# Patient Record
Sex: MALE | NOT HISPANIC OR LATINO | Employment: UNEMPLOYED | RURAL
[De-identification: names, ages, dates, MRNs, and addresses within clinical notes are randomized per-mention and may not be internally consistent; named-entity substitution may affect disease eponyms.]

---

## 2020-11-17 ENCOUNTER — HISTORICAL (OUTPATIENT)
Dept: ADMINISTRATIVE | Facility: HOSPITAL | Age: 85
End: 2020-11-17

## 2020-11-17 LAB — SARS-COV+SARS-COV-2 AG RESP QL IA.RAPID: NEGATIVE

## 2020-11-20 ENCOUNTER — HISTORICAL (OUTPATIENT)
Dept: ADMINISTRATIVE | Facility: HOSPITAL | Age: 85
End: 2020-11-20

## 2020-11-20 LAB — SARS-COV+SARS-COV-2 AG RESP QL IA.RAPID: NEGATIVE

## 2020-11-24 ENCOUNTER — HISTORICAL (OUTPATIENT)
Dept: ADMINISTRATIVE | Facility: HOSPITAL | Age: 85
End: 2020-11-24

## 2020-11-24 LAB — SARS-COV+SARS-COV-2 AG RESP QL IA.RAPID: NEGATIVE

## 2020-11-27 ENCOUNTER — HISTORICAL (OUTPATIENT)
Dept: ADMINISTRATIVE | Facility: HOSPITAL | Age: 85
End: 2020-11-27

## 2020-11-27 LAB — SARS-COV+SARS-COV-2 AG RESP QL IA.RAPID: NEGATIVE

## 2020-12-01 ENCOUNTER — HISTORICAL (OUTPATIENT)
Dept: ADMINISTRATIVE | Facility: HOSPITAL | Age: 85
End: 2020-12-01

## 2020-12-01 LAB — SARS-COV+SARS-COV-2 AG RESP QL IA.RAPID: NEGATIVE

## 2020-12-04 ENCOUNTER — HISTORICAL (OUTPATIENT)
Dept: ADMINISTRATIVE | Facility: HOSPITAL | Age: 85
End: 2020-12-04

## 2020-12-04 LAB — SARS-COV+SARS-COV-2 AG RESP QL IA.RAPID: NEGATIVE

## 2020-12-08 ENCOUNTER — HISTORICAL (OUTPATIENT)
Dept: ADMINISTRATIVE | Facility: HOSPITAL | Age: 85
End: 2020-12-08

## 2020-12-08 LAB — SARS-COV+SARS-COV-2 AG RESP QL IA.RAPID: NEGATIVE

## 2020-12-11 ENCOUNTER — HISTORICAL (OUTPATIENT)
Dept: ADMINISTRATIVE | Facility: HOSPITAL | Age: 85
End: 2020-12-11

## 2020-12-11 LAB — SARS-COV+SARS-COV-2 AG RESP QL IA.RAPID: NEGATIVE

## 2020-12-15 ENCOUNTER — HISTORICAL (OUTPATIENT)
Dept: ADMINISTRATIVE | Facility: HOSPITAL | Age: 85
End: 2020-12-15

## 2020-12-15 LAB — SARS-COV+SARS-COV-2 AG RESP QL IA.RAPID: NEGATIVE

## 2020-12-18 ENCOUNTER — HISTORICAL (OUTPATIENT)
Dept: ADMINISTRATIVE | Facility: HOSPITAL | Age: 85
End: 2020-12-18

## 2020-12-18 LAB — SARS-COV+SARS-COV-2 AG RESP QL IA.RAPID: NEGATIVE

## 2020-12-22 ENCOUNTER — HISTORICAL (OUTPATIENT)
Dept: ADMINISTRATIVE | Facility: HOSPITAL | Age: 85
End: 2020-12-22

## 2020-12-22 LAB — SARS-COV+SARS-COV-2 AG RESP QL IA.RAPID: NEGATIVE

## 2020-12-24 ENCOUNTER — HISTORICAL (OUTPATIENT)
Dept: ADMINISTRATIVE | Facility: HOSPITAL | Age: 85
End: 2020-12-24

## 2020-12-24 LAB — SARS-COV+SARS-COV-2 AG RESP QL IA.RAPID: NEGATIVE

## 2020-12-29 ENCOUNTER — HISTORICAL (OUTPATIENT)
Dept: ADMINISTRATIVE | Facility: HOSPITAL | Age: 85
End: 2020-12-29

## 2020-12-29 LAB — SARS-COV+SARS-COV-2 AG RESP QL IA.RAPID: NEGATIVE

## 2020-12-31 ENCOUNTER — HISTORICAL (OUTPATIENT)
Dept: ADMINISTRATIVE | Facility: HOSPITAL | Age: 85
End: 2020-12-31

## 2020-12-31 LAB — SARS-COV+SARS-COV-2 AG RESP QL IA.RAPID: NEGATIVE

## 2021-01-05 ENCOUNTER — HISTORICAL (OUTPATIENT)
Dept: ADMINISTRATIVE | Facility: HOSPITAL | Age: 86
End: 2021-01-05

## 2021-01-05 LAB — SARS-COV+SARS-COV-2 AG RESP QL IA.RAPID: NEGATIVE

## 2021-01-07 ENCOUNTER — HISTORICAL (OUTPATIENT)
Dept: ADMINISTRATIVE | Facility: HOSPITAL | Age: 86
End: 2021-01-07

## 2021-01-07 LAB — SARS-COV+SARS-COV-2 AG RESP QL IA.RAPID: NEGATIVE

## 2021-01-12 ENCOUNTER — HISTORICAL (OUTPATIENT)
Dept: ADMINISTRATIVE | Facility: HOSPITAL | Age: 86
End: 2021-01-12

## 2021-01-12 LAB — SARS-COV+SARS-COV-2 AG RESP QL IA.RAPID: NEGATIVE

## 2021-01-14 ENCOUNTER — HISTORICAL (OUTPATIENT)
Dept: ADMINISTRATIVE | Facility: HOSPITAL | Age: 86
End: 2021-01-14

## 2021-01-14 LAB — SARS-COV+SARS-COV-2 AG RESP QL IA.RAPID: NEGATIVE

## 2021-01-19 ENCOUNTER — HISTORICAL (OUTPATIENT)
Dept: ADMINISTRATIVE | Facility: HOSPITAL | Age: 86
End: 2021-01-19

## 2021-01-19 LAB — SARS-COV+SARS-COV-2 AG RESP QL IA.RAPID: NEGATIVE

## 2021-01-21 ENCOUNTER — HISTORICAL (OUTPATIENT)
Dept: ADMINISTRATIVE | Facility: HOSPITAL | Age: 86
End: 2021-01-21

## 2021-01-21 LAB — SARS-COV+SARS-COV-2 AG RESP QL IA.RAPID: NEGATIVE

## 2021-01-26 ENCOUNTER — HISTORICAL (OUTPATIENT)
Dept: ADMINISTRATIVE | Facility: HOSPITAL | Age: 86
End: 2021-01-26

## 2021-01-26 LAB — SARS-COV+SARS-COV-2 AG RESP QL IA.RAPID: NEGATIVE

## 2021-01-28 ENCOUNTER — HISTORICAL (OUTPATIENT)
Dept: ADMINISTRATIVE | Facility: HOSPITAL | Age: 86
End: 2021-01-28

## 2021-01-28 LAB — SARS-COV+SARS-COV-2 AG RESP QL IA.RAPID: NEGATIVE

## 2021-02-02 ENCOUNTER — HISTORICAL (OUTPATIENT)
Dept: ADMINISTRATIVE | Facility: HOSPITAL | Age: 86
End: 2021-02-02

## 2021-02-02 LAB — SARS-COV+SARS-COV-2 AG RESP QL IA.RAPID: NEGATIVE

## 2021-02-04 ENCOUNTER — HISTORICAL (OUTPATIENT)
Dept: ADMINISTRATIVE | Facility: HOSPITAL | Age: 86
End: 2021-02-04

## 2021-02-04 LAB — SARS-COV+SARS-COV-2 AG RESP QL IA.RAPID: NEGATIVE

## 2021-02-09 ENCOUNTER — HISTORICAL (OUTPATIENT)
Dept: ADMINISTRATIVE | Facility: HOSPITAL | Age: 86
End: 2021-02-09

## 2021-02-09 LAB — SARS-COV+SARS-COV-2 AG RESP QL IA.RAPID: NEGATIVE

## 2021-02-11 ENCOUNTER — HISTORICAL (OUTPATIENT)
Dept: ADMINISTRATIVE | Facility: HOSPITAL | Age: 86
End: 2021-02-11

## 2021-02-12 LAB — SARS-COV+SARS-COV-2 AG RESP QL IA.RAPID: NEGATIVE

## 2021-02-17 ENCOUNTER — HISTORICAL (OUTPATIENT)
Dept: ADMINISTRATIVE | Facility: HOSPITAL | Age: 86
End: 2021-02-17

## 2021-02-17 LAB — SARS-COV+SARS-COV-2 AG RESP QL IA.RAPID: NEGATIVE

## 2021-02-19 ENCOUNTER — HISTORICAL (OUTPATIENT)
Dept: ADMINISTRATIVE | Facility: HOSPITAL | Age: 86
End: 2021-02-19

## 2021-02-19 LAB — SARS-COV+SARS-COV-2 AG RESP QL IA.RAPID: NEGATIVE

## 2021-02-23 ENCOUNTER — HISTORICAL (OUTPATIENT)
Dept: ADMINISTRATIVE | Facility: HOSPITAL | Age: 86
End: 2021-02-23

## 2021-02-23 LAB — SARS-COV+SARS-COV-2 AG RESP QL IA.RAPID: NEGATIVE

## 2021-02-25 ENCOUNTER — HISTORICAL (OUTPATIENT)
Dept: ADMINISTRATIVE | Facility: HOSPITAL | Age: 86
End: 2021-02-25

## 2021-02-26 LAB — SARS-COV+SARS-COV-2 AG RESP QL IA.RAPID: NEGATIVE

## 2022-09-26 ENCOUNTER — LAB REQUISITION (OUTPATIENT)
Dept: LAB | Facility: HOSPITAL | Age: 87
End: 2022-09-26
Attending: INTERNAL MEDICINE
Payer: MEDICARE

## 2022-09-26 DIAGNOSIS — I10 ESSENTIAL (PRIMARY) HYPERTENSION: ICD-10-CM

## 2022-09-26 DIAGNOSIS — E78.5 HYPERLIPIDEMIA, UNSPECIFIED: ICD-10-CM

## 2022-09-26 LAB
ALBUMIN SERPL BCP-MCNC: 2.9 G/DL (ref 3.5–5)
ALBUMIN/GLOB SERPL: 0.9 {RATIO}
ALP SERPL-CCNC: 86 U/L (ref 45–115)
ALT SERPL W P-5'-P-CCNC: 14 U/L (ref 16–61)
ANION GAP SERPL CALCULATED.3IONS-SCNC: 13 MMOL/L (ref 7–16)
AST SERPL W P-5'-P-CCNC: 16 U/L (ref 15–37)
BASOPHILS # BLD AUTO: 0.01 K/UL (ref 0–0.2)
BASOPHILS NFR BLD AUTO: 0.5 % (ref 0–1)
BILIRUB SERPL-MCNC: 0.3 MG/DL (ref ?–1.2)
BUN SERPL-MCNC: 19 MG/DL (ref 7–18)
BUN/CREAT SERPL: 15 (ref 6–20)
CALCIUM SERPL-MCNC: 8.3 MG/DL (ref 8.5–10.1)
CHLORIDE SERPL-SCNC: 107 MMOL/L (ref 98–107)
CHOLEST SERPL-MCNC: 153 MG/DL (ref 0–200)
CHOLEST/HDLC SERPL: 2.2 {RATIO}
CO2 SERPL-SCNC: 28 MMOL/L (ref 21–32)
CREAT SERPL-MCNC: 1.24 MG/DL (ref 0.7–1.3)
DIFFERENTIAL METHOD BLD: ABNORMAL
EGFR (NO RACE VARIABLE) (RUSH/TITUS): 56 ML/MIN/1.73M²
EOSINOPHIL # BLD AUTO: 0.13 K/UL (ref 0–0.5)
EOSINOPHIL NFR BLD AUTO: 6.6 % (ref 1–4)
EOSINOPHIL NFR BLD MANUAL: 4 % (ref 1–4)
ERYTHROCYTE [DISTWIDTH] IN BLOOD BY AUTOMATED COUNT: 16.4 % (ref 11.5–14.5)
GLOBULIN SER-MCNC: 3.3 G/DL (ref 2–4)
GLUCOSE SERPL-MCNC: 67 MG/DL (ref 74–106)
HCT VFR BLD AUTO: 55.4 % (ref 40–54)
HDLC SERPL-MCNC: 71 MG/DL (ref 40–60)
HGB BLD-MCNC: 17.9 G/DL (ref 13.5–18)
LDLC SERPL CALC-MCNC: 70 MG/DL
LDLC/HDLC SERPL: 1 {RATIO}
LYMPHOCYTES # BLD AUTO: 1.25 K/UL (ref 1–4.8)
LYMPHOCYTES NFR BLD AUTO: 63.1 % (ref 27–41)
LYMPHOCYTES NFR BLD MANUAL: 66 % (ref 27–41)
MCH RBC QN AUTO: 30.3 PG (ref 27–31)
MCHC RBC AUTO-ENTMCNC: 32.3 G/DL (ref 32–36)
MCV RBC AUTO: 93.9 FL (ref 80–96)
MONOCYTES # BLD AUTO: 0.12 K/UL (ref 0–0.8)
MONOCYTES NFR BLD AUTO: 6.1 % (ref 2–6)
MONOCYTES NFR BLD MANUAL: 5 % (ref 2–6)
MPC BLD CALC-MCNC: 9.6 FL (ref 9.4–12.4)
NEUTROPHILS # BLD AUTO: 0.47 K/UL (ref 1.8–7.7)
NEUTROPHILS NFR BLD AUTO: 23.7 % (ref 53–65)
NEUTS SEG NFR BLD MANUAL: 25 % (ref 50–62)
NONHDLC SERPL-MCNC: 82 MG/DL
NRBC BLD MANUAL-RTO: ABNORMAL %
PLATELET # BLD AUTO: 94 K/UL (ref 150–400)
PLATELET MORPHOLOGY: ABNORMAL
POTASSIUM SERPL-SCNC: 4.4 MMOL/L (ref 3.5–5.1)
PROT SERPL-MCNC: 6.2 G/DL (ref 6.4–8.2)
RBC # BLD AUTO: 5.9 M/UL (ref 4.6–6.2)
RBC MORPH BLD: NORMAL
SODIUM SERPL-SCNC: 144 MMOL/L (ref 136–145)
T4 FREE SERPL-MCNC: 1.03 NG/DL (ref 0.76–1.46)
TRIGL SERPL-MCNC: 58 MG/DL (ref 35–150)
TSH SERPL DL<=0.005 MIU/L-ACNC: 2.81 UIU/ML (ref 0.36–3.74)
VLDLC SERPL-MCNC: 12 MG/DL
WBC # BLD AUTO: 1.98 K/UL (ref 4.5–11)

## 2022-09-26 PROCEDURE — 85025 COMPLETE CBC W/AUTO DIFF WBC: CPT

## 2022-09-26 PROCEDURE — 84439 ASSAY OF FREE THYROXINE: CPT

## 2022-09-26 PROCEDURE — 80053 COMPREHEN METABOLIC PANEL: CPT

## 2022-09-26 PROCEDURE — 80061 LIPID PANEL: CPT

## 2022-09-26 PROCEDURE — 84443 ASSAY THYROID STIM HORMONE: CPT

## 2023-01-01 ENCOUNTER — LAB REQUISITION (OUTPATIENT)
Dept: LAB | Facility: HOSPITAL | Age: 88
End: 2023-01-01
Attending: INTERNAL MEDICINE
Payer: MEDICARE

## 2023-01-01 ENCOUNTER — HOSPITAL ENCOUNTER (INPATIENT)
Facility: HOSPITAL | Age: 88
LOS: 10 days | Discharge: NURSING, REHAB OR LONG-TERM CARE | DRG: 690 | End: 2023-07-24
Attending: FAMILY MEDICINE | Admitting: FAMILY MEDICINE
Payer: MEDICARE

## 2023-01-01 ENCOUNTER — HOSPITAL ENCOUNTER (OUTPATIENT)
Dept: RADIOLOGY | Facility: HOSPITAL | Age: 88
Discharge: HOME | End: 2023-08-28
Payer: MEDICARE

## 2023-01-01 ENCOUNTER — ANESTHESIA (OUTPATIENT)
Dept: GASTROENTEROLOGY | Facility: HOSPITAL | Age: 88
End: 2023-01-01
Payer: MEDICARE

## 2023-01-01 ENCOUNTER — ANESTHESIA EVENT (OUTPATIENT)
Dept: GASTROENTEROLOGY | Facility: HOSPITAL | Age: 88
End: 2023-01-01
Payer: MEDICARE

## 2023-01-01 ENCOUNTER — HOSPITAL ENCOUNTER (EMERGENCY)
Facility: HOSPITAL | Age: 88
Discharge: NURSING, REHAB OR LONG-TERM CARE | End: 2023-08-07
Attending: FAMILY MEDICINE
Payer: MEDICARE

## 2023-01-01 ENCOUNTER — HOSPITAL ENCOUNTER (INPATIENT)
Facility: HOSPITAL | Age: 88
LOS: 3 days | Discharge: NURSING, REHAB OR LONG-TERM CARE | DRG: 690 | End: 2023-07-14
Attending: FAMILY MEDICINE | Admitting: FAMILY MEDICINE
Payer: MEDICARE

## 2023-01-01 ENCOUNTER — HOSPITAL ENCOUNTER (OUTPATIENT)
Dept: GASTROENTEROLOGY | Facility: HOSPITAL | Age: 88
Discharge: HOME | End: 2023-07-21
Attending: INTERNAL MEDICINE
Payer: MEDICARE

## 2023-01-01 ENCOUNTER — HOSPITAL ENCOUNTER (EMERGENCY)
Facility: HOSPITAL | Age: 88
Discharge: HOME | End: 2023-07-30
Payer: MEDICARE

## 2023-01-01 VITALS
WEIGHT: 147 LBS | TEMPERATURE: 99 F | HEART RATE: 84 BPM | SYSTOLIC BLOOD PRESSURE: 117 MMHG | RESPIRATION RATE: 17 BRPM | OXYGEN SATURATION: 100 % | HEIGHT: 70 IN | BODY MASS INDEX: 21.05 KG/M2 | DIASTOLIC BLOOD PRESSURE: 67 MMHG

## 2023-01-01 VITALS
BODY MASS INDEX: 21.09 KG/M2 | SYSTOLIC BLOOD PRESSURE: 156 MMHG | DIASTOLIC BLOOD PRESSURE: 63 MMHG | DIASTOLIC BLOOD PRESSURE: 51 MMHG | OXYGEN SATURATION: 98 % | HEART RATE: 59 BPM | OXYGEN SATURATION: 100 % | RESPIRATION RATE: 19 BRPM | BODY MASS INDEX: 23.05 KG/M2 | RESPIRATION RATE: 18 BRPM | SYSTOLIC BLOOD PRESSURE: 121 MMHG | TEMPERATURE: 98 F | SYSTOLIC BLOOD PRESSURE: 113 MMHG | TEMPERATURE: 98 F | HEIGHT: 70 IN | TEMPERATURE: 98 F | DIASTOLIC BLOOD PRESSURE: 101 MMHG | BODY MASS INDEX: 21.05 KG/M2 | HEART RATE: 83 BPM | WEIGHT: 147 LBS | HEIGHT: 70 IN | HEART RATE: 57 BPM | RESPIRATION RATE: 17 BRPM | OXYGEN SATURATION: 98 % | HEIGHT: 70 IN | WEIGHT: 161 LBS

## 2023-01-01 VITALS
RESPIRATION RATE: 14 BRPM | TEMPERATURE: 98 F | OXYGEN SATURATION: 100 % | DIASTOLIC BLOOD PRESSURE: 46 MMHG | HEART RATE: 50 BPM | SYSTOLIC BLOOD PRESSURE: 139 MMHG

## 2023-01-01 DIAGNOSIS — N39.0 URINARY TRACT INFECTION: ICD-10-CM

## 2023-01-01 DIAGNOSIS — R13.11 ORAL PHASE DYSPHAGIA: ICD-10-CM

## 2023-01-01 DIAGNOSIS — I10 ESSENTIAL (PRIMARY) HYPERTENSION: ICD-10-CM

## 2023-01-01 DIAGNOSIS — K29.00 ACUTE SUPERFICIAL GASTRITIS WITHOUT HEMORRHAGE: ICD-10-CM

## 2023-01-01 DIAGNOSIS — K44.9 HH (HIATUS HERNIA): ICD-10-CM

## 2023-01-01 DIAGNOSIS — M62.50 MUSCLE WASTING AND ATROPHY, NOT ELSEWHERE CLASSIFIED, UNSPECIFIED SITE: ICD-10-CM

## 2023-01-01 DIAGNOSIS — R62.7 ADULT FAILURE TO THRIVE: Primary | ICD-10-CM

## 2023-01-01 DIAGNOSIS — R05.9 COUGH: ICD-10-CM

## 2023-01-01 DIAGNOSIS — E78.5 HYPERLIPIDEMIA, UNSPECIFIED: ICD-10-CM

## 2023-01-01 DIAGNOSIS — K94.23 PEG TUBE MALFUNCTION: Primary | ICD-10-CM

## 2023-01-01 DIAGNOSIS — R11.0 NAUSEA: ICD-10-CM

## 2023-01-01 DIAGNOSIS — R11.10 VOMITING, UNSPECIFIED VOMITING TYPE, UNSPECIFIED WHETHER NAUSEA PRESENT: Primary | ICD-10-CM

## 2023-01-01 DIAGNOSIS — N30.01 ACUTE CYSTITIS WITH HEMATURIA: Primary | ICD-10-CM

## 2023-01-01 DIAGNOSIS — R41.82 ALTERED MENTAL STATUS, UNSPECIFIED: ICD-10-CM

## 2023-01-01 DIAGNOSIS — Z46.59: ICD-10-CM

## 2023-01-01 DIAGNOSIS — Z93.1 S/P PERCUTANEOUS ENDOSCOPIC GASTROSTOMY (PEG) TUBE PLACEMENT: Primary | ICD-10-CM

## 2023-01-01 DIAGNOSIS — G30.9 ALZHEIMER'S DISEASE, UNSPECIFIED (CODE): ICD-10-CM

## 2023-01-01 DIAGNOSIS — Z93.1 S/P PERCUTANEOUS ENDOSCOPIC GASTROSTOMY (PEG) TUBE PLACEMENT: ICD-10-CM

## 2023-01-01 LAB
25(OH)D3 SERPL-MCNC: 37.7 NG/ML
ALBUMIN SERPL BCP-MCNC: 2.5 G/DL (ref 3.5–5)
ALBUMIN SERPL BCP-MCNC: 2.6 G/DL (ref 3.5–5)
ALBUMIN SERPL BCP-MCNC: 2.7 G/DL (ref 3.5–5)
ALBUMIN SERPL BCP-MCNC: 2.9 G/DL (ref 3.5–5)
ALBUMIN/GLOB SERPL: 0.5 {RATIO}
ALBUMIN/GLOB SERPL: 0.6 {RATIO}
ALBUMIN/GLOB SERPL: 0.7 {RATIO}
ALP SERPL-CCNC: 77 U/L (ref 45–115)
ALP SERPL-CCNC: 88 U/L (ref 45–115)
ALP SERPL-CCNC: 90 U/L (ref 45–115)
ALP SERPL-CCNC: 91 U/L (ref 45–115)
ALP SERPL-CCNC: 92 U/L (ref 45–115)
ALP SERPL-CCNC: 96 U/L (ref 45–115)
ALT SERPL W P-5'-P-CCNC: 16 U/L (ref 16–61)
ALT SERPL W P-5'-P-CCNC: 23 U/L (ref 16–61)
ALT SERPL W P-5'-P-CCNC: 23 U/L (ref 16–61)
ALT SERPL W P-5'-P-CCNC: 27 U/L (ref 16–61)
ALT SERPL W P-5'-P-CCNC: 39 U/L (ref 16–61)
ALT SERPL W P-5'-P-CCNC: 43 U/L (ref 16–61)
ANION GAP SERPL CALCULATED.3IONS-SCNC: 11 MMOL/L (ref 7–16)
ANION GAP SERPL CALCULATED.3IONS-SCNC: 11 MMOL/L (ref 7–16)
ANION GAP SERPL CALCULATED.3IONS-SCNC: 12 MMOL/L (ref 7–16)
ANION GAP SERPL CALCULATED.3IONS-SCNC: 15 MMOL/L (ref 7–16)
AST SERPL W P-5'-P-CCNC: 21 U/L (ref 15–37)
AST SERPL W P-5'-P-CCNC: 23 U/L (ref 15–37)
AST SERPL W P-5'-P-CCNC: 25 U/L (ref 15–37)
AST SERPL W P-5'-P-CCNC: 28 U/L (ref 15–37)
AST SERPL W P-5'-P-CCNC: 37 U/L (ref 15–37)
AST SERPL W P-5'-P-CCNC: 51 U/L (ref 15–37)
BACTERIA #/AREA URNS HPF: ABNORMAL /HPF
BACTERIA BLD CULT: NORMAL
BASOPHILS # BLD AUTO: 0.01 K/UL (ref 0–0.2)
BASOPHILS # BLD AUTO: 0.02 K/UL (ref 0–0.2)
BASOPHILS # BLD AUTO: 0.02 K/UL (ref 0–0.2)
BASOPHILS # BLD AUTO: 0.03 K/UL (ref 0–0.2)
BASOPHILS NFR BLD AUTO: 0.2 % (ref 0–1)
BASOPHILS NFR BLD AUTO: 0.3 % (ref 0–1)
BASOPHILS NFR BLD AUTO: 0.3 % (ref 0–1)
BASOPHILS NFR BLD AUTO: 0.5 % (ref 0–1)
BASOPHILS NFR BLD AUTO: 0.8 % (ref 0–1)
BILIRUB DIRECT SERPL-MCNC: 0.1 MG/DL (ref 0–0.2)
BILIRUB SERPL-MCNC: 0.2 MG/DL (ref ?–1.2)
BILIRUB SERPL-MCNC: 0.3 MG/DL (ref ?–1.2)
BILIRUB SERPL-MCNC: 0.3 MG/DL (ref ?–1.2)
BILIRUB SERPL-MCNC: 0.4 MG/DL (ref ?–1.2)
BILIRUB SERPL-MCNC: 0.4 MG/DL (ref ?–1.2)
BILIRUB SERPL-MCNC: 0.5 MG/DL (ref ?–1.2)
BILIRUB UR QL STRIP: NEGATIVE
BUN SERPL-MCNC: 16 MG/DL (ref 7–18)
BUN SERPL-MCNC: 2 MG/DL (ref 7–18)
BUN SERPL-MCNC: 27 MG/DL (ref 7–18)
BUN SERPL-MCNC: 32 MG/DL (ref 7–18)
BUN SERPL-MCNC: 32 MG/DL (ref 7–18)
BUN SERPL-MCNC: 35 MG/DL (ref 7–18)
BUN SERPL-MCNC: 38 MG/DL (ref 7–18)
BUN SERPL-MCNC: 4 MG/DL (ref 7–18)
BUN SERPL-MCNC: 41 MG/DL (ref 7–18)
BUN SERPL-MCNC: 9 MG/DL (ref 7–18)
BUN/CREAT SERPL: 14 (ref 6–20)
BUN/CREAT SERPL: 18 (ref 6–20)
BUN/CREAT SERPL: 2 (ref 6–20)
BUN/CREAT SERPL: 24 (ref 6–20)
BUN/CREAT SERPL: 25 (ref 6–20)
BUN/CREAT SERPL: 26 (ref 6–20)
BUN/CREAT SERPL: 26 (ref 6–20)
BUN/CREAT SERPL: 28 (ref 6–20)
BUN/CREAT SERPL: 4 (ref 6–20)
BUN/CREAT SERPL: 9 (ref 6–20)
CALCIUM SERPL-MCNC: 8.1 MG/DL (ref 8.5–10.1)
CALCIUM SERPL-MCNC: 8.3 MG/DL (ref 8.5–10.1)
CALCIUM SERPL-MCNC: 8.7 MG/DL (ref 8.5–10.1)
CALCIUM SERPL-MCNC: 8.8 MG/DL (ref 8.5–10.1)
CALCIUM SERPL-MCNC: 8.9 MG/DL (ref 8.5–10.1)
CALCIUM SERPL-MCNC: 8.9 MG/DL (ref 8.5–10.1)
CALCIUM SERPL-MCNC: 9.1 MG/DL (ref 8.5–10.1)
CALCIUM SERPL-MCNC: 9.2 MG/DL (ref 8.5–10.1)
CALCIUM SERPL-MCNC: 9.3 MG/DL (ref 8.5–10.1)
CALCIUM SERPL-MCNC: 9.3 MG/DL (ref 8.5–10.1)
CHLORIDE SERPL-SCNC: 102 MMOL/L (ref 98–107)
CHLORIDE SERPL-SCNC: 103 MMOL/L (ref 98–107)
CHLORIDE SERPL-SCNC: 103 MMOL/L (ref 98–107)
CHLORIDE SERPL-SCNC: 104 MMOL/L (ref 98–107)
CHLORIDE SERPL-SCNC: 105 MMOL/L (ref 98–107)
CHLORIDE SERPL-SCNC: 111 MMOL/L (ref 98–107)
CHLORIDE SERPL-SCNC: 121 MMOL/L (ref 98–107)
CHLORIDE SERPL-SCNC: 123 MMOL/L (ref 98–107)
CHLORIDE SERPL-SCNC: 97 MMOL/L (ref 98–107)
CHLORIDE SERPL-SCNC: 98 MMOL/L (ref 98–107)
CHOLEST SERPL-MCNC: 131 MG/DL (ref 0–200)
CHOLEST/HDLC SERPL: 2.6 {RATIO}
CLARITY UR: CLEAR
CO2 SERPL-SCNC: 23 MMOL/L (ref 21–32)
CO2 SERPL-SCNC: 23 MMOL/L (ref 21–32)
CO2 SERPL-SCNC: 24 MMOL/L (ref 21–32)
CO2 SERPL-SCNC: 24 MMOL/L (ref 21–32)
CO2 SERPL-SCNC: 26 MMOL/L (ref 21–32)
CO2 SERPL-SCNC: 27 MMOL/L (ref 21–32)
CO2 SERPL-SCNC: 28 MMOL/L (ref 21–32)
CO2 SERPL-SCNC: 29 MMOL/L (ref 21–32)
COLOR UR: YELLOW
CREAT SERPL-MCNC: 1 MG/DL (ref 0.7–1.3)
CREAT SERPL-MCNC: 1.01 MG/DL (ref 0.7–1.3)
CREAT SERPL-MCNC: 1.05 MG/DL (ref 0.7–1.3)
CREAT SERPL-MCNC: 1.07 MG/DL (ref 0.7–1.3)
CREAT SERPL-MCNC: 1.16 MG/DL (ref 0.7–1.3)
CREAT SERPL-MCNC: 1.25 MG/DL (ref 0.7–1.3)
CREAT SERPL-MCNC: 1.34 MG/DL (ref 0.7–1.3)
CREAT SERPL-MCNC: 1.44 MG/DL (ref 0.7–1.3)
CREAT SERPL-MCNC: 1.53 MG/DL (ref 0.7–1.3)
CREAT SERPL-MCNC: 1.93 MG/DL (ref 0.7–1.3)
DIFFERENTIAL METHOD BLD: ABNORMAL
EGFR (NO RACE VARIABLE) (RUSH/TITUS): 33 ML/MIN/1.73M2
EGFR (NO RACE VARIABLE) (RUSH/TITUS): 43 ML/MIN/1.73M2
EGFR (NO RACE VARIABLE) (RUSH/TITUS): 46 ML/MIN/1.73M2
EGFR (NO RACE VARIABLE) (RUSH/TITUS): 51 ML/MIN/1.73M2
EGFR (NO RACE VARIABLE) (RUSH/TITUS): 55 ML/MIN/1.73M2
EGFR (NO RACE VARIABLE) (RUSH/TITUS): 60 ML/MIN/1.73M2
EGFR (NO RACE VARIABLE) (RUSH/TITUS): 66 ML/MIN/1.73M2
EGFR (NO RACE VARIABLE) (RUSH/TITUS): 67 ML/MIN/1.73M2
EGFR (NO RACE VARIABLE) (RUSH/TITUS): 71 ML/MIN/1.73M2
EGFR (NO RACE VARIABLE) (RUSH/TITUS): 72 ML/MIN/1.73M2
EOSINOPHIL # BLD AUTO: 0 K/UL (ref 0–0.5)
EOSINOPHIL # BLD AUTO: 0.1 K/UL (ref 0–0.5)
EOSINOPHIL # BLD AUTO: 0.12 K/UL (ref 0–0.5)
EOSINOPHIL # BLD AUTO: 0.13 K/UL (ref 0–0.5)
EOSINOPHIL # BLD AUTO: 0.13 K/UL (ref 0–0.5)
EOSINOPHIL # BLD AUTO: 0.17 K/UL (ref 0–0.5)
EOSINOPHIL # BLD AUTO: 0.18 K/UL (ref 0–0.5)
EOSINOPHIL # BLD AUTO: 0.2 K/UL (ref 0–0.5)
EOSINOPHIL NFR BLD AUTO: 0 % (ref 1–4)
EOSINOPHIL NFR BLD AUTO: 1.5 % (ref 1–4)
EOSINOPHIL NFR BLD AUTO: 2.3 % (ref 1–4)
EOSINOPHIL NFR BLD AUTO: 3 % (ref 1–4)
EOSINOPHIL NFR BLD AUTO: 3.4 % (ref 1–4)
EOSINOPHIL NFR BLD AUTO: 4.3 % (ref 1–4)
EOSINOPHIL NFR BLD AUTO: 5.5 % (ref 1–4)
EOSINOPHIL NFR BLD AUTO: 5.7 % (ref 1–4)
ERYTHROCYTE [DISTWIDTH] IN BLOOD BY AUTOMATED COUNT: 15.4 % (ref 11.5–14.5)
ERYTHROCYTE [DISTWIDTH] IN BLOOD BY AUTOMATED COUNT: 15.5 % (ref 11.5–14.5)
ERYTHROCYTE [DISTWIDTH] IN BLOOD BY AUTOMATED COUNT: 15.8 % (ref 11.5–14.5)
ERYTHROCYTE [DISTWIDTH] IN BLOOD BY AUTOMATED COUNT: 15.9 % (ref 11.5–14.5)
ERYTHROCYTE [DISTWIDTH] IN BLOOD BY AUTOMATED COUNT: 17.2 % (ref 11.5–14.5)
ERYTHROCYTE [DISTWIDTH] IN BLOOD BY AUTOMATED COUNT: 17.4 % (ref 11.5–14.5)
ERYTHROCYTE [DISTWIDTH] IN BLOOD BY AUTOMATED COUNT: 18.1 % (ref 11.5–14.5)
ERYTHROCYTE [DISTWIDTH] IN BLOOD BY AUTOMATED COUNT: 18.4 % (ref 11.5–14.5)
FLUAV AG UPPER RESP QL IA.RAPID: NEGATIVE
FLUBV AG UPPER RESP QL IA.RAPID: NEGATIVE
FOLATE SERPL-MCNC: 14.5 NG/ML (ref 3.1–17.5)
GLOBULIN SER-MCNC: 4 G/DL (ref 2–4)
GLOBULIN SER-MCNC: 4.2 G/DL (ref 2–4)
GLOBULIN SER-MCNC: 5.1 G/DL (ref 2–4)
GLOBULIN SER-MCNC: 5.4 G/DL (ref 2–4)
GLOBULIN SER-MCNC: 5.4 G/DL (ref 2–4)
GLUCOSE SERPL-MCNC: 100 MG/DL (ref 74–106)
GLUCOSE SERPL-MCNC: 105 MG/DL (ref 74–106)
GLUCOSE SERPL-MCNC: 113 MG/DL (ref 74–106)
GLUCOSE SERPL-MCNC: 114 MG/DL (ref 70–105)
GLUCOSE SERPL-MCNC: 132 MG/DL (ref 74–106)
GLUCOSE SERPL-MCNC: 133 MG/DL (ref 74–106)
GLUCOSE SERPL-MCNC: 90 MG/DL (ref 74–106)
GLUCOSE SERPL-MCNC: 91 MG/DL (ref 74–106)
GLUCOSE SERPL-MCNC: 92 MG/DL (ref 74–106)
GLUCOSE SERPL-MCNC: 92 MG/DL (ref 74–106)
GLUCOSE SERPL-MCNC: 98 MG/DL (ref 74–106)
GLUCOSE UR STRIP-MCNC: NEGATIVE MG/DL
HCT VFR BLD AUTO: 25 % (ref 40–54)
HCT VFR BLD AUTO: 26.2 % (ref 40–54)
HCT VFR BLD AUTO: 26.7 % (ref 40–54)
HCT VFR BLD AUTO: 30.1 % (ref 40–54)
HCT VFR BLD AUTO: 31.7 % (ref 40–54)
HCT VFR BLD AUTO: 32.5 % (ref 40–54)
HCT VFR BLD AUTO: 32.7 % (ref 40–54)
HCT VFR BLD AUTO: 34.9 % (ref 40–54)
HCT VFR BLD AUTO: 38.2 % (ref 40–54)
HDLC SERPL-MCNC: 51 MG/DL (ref 40–60)
HGB BLD-MCNC: 10.2 G/DL (ref 13.5–18)
HGB BLD-MCNC: 10.4 G/DL (ref 13.5–18)
HGB BLD-MCNC: 10.8 G/DL (ref 13.5–18)
HGB BLD-MCNC: 10.9 G/DL (ref 13.5–18)
HGB BLD-MCNC: 11.9 G/DL (ref 13.5–18)
HGB BLD-MCNC: 7.6 G/DL (ref 13.5–18)
HGB BLD-MCNC: 8.3 G/DL (ref 13.5–18)
HGB BLD-MCNC: 8.8 G/DL (ref 13.5–18)
HGB BLD-MCNC: 9.4 G/DL (ref 13.5–18)
IMM GRANULOCYTES # BLD AUTO: 0.1 K/UL (ref 0–0.04)
IMM GRANULOCYTES NFR BLD: 0.8 % (ref 0–0.4)
IRON SATN MFR SERPL: 9 % (ref 14–50)
IRON SERPL-MCNC: 31 ΜG/DL (ref 65–175)
KETONES UR STRIP-SCNC: NEGATIVE MG/DL
LACTATE SERPL-SCNC: 0.7 MMOL/L (ref 0.4–2)
LDLC SERPL CALC-MCNC: 63 MG/DL
LDLC/HDLC SERPL: 1.2 {RATIO}
LEUKOCYTE ESTERASE UR QL STRIP: ABNORMAL
LYMPHOCYTES # BLD AUTO: 0.76 K/UL (ref 1–4.8)
LYMPHOCYTES # BLD AUTO: 0.85 K/UL (ref 1–4.8)
LYMPHOCYTES # BLD AUTO: 0.92 K/UL (ref 1–4.8)
LYMPHOCYTES # BLD AUTO: 1 K/UL (ref 1–4.8)
LYMPHOCYTES # BLD AUTO: 1.05 K/UL (ref 1–4.8)
LYMPHOCYTES # BLD AUTO: 1.13 K/UL (ref 1–4.8)
LYMPHOCYTES # BLD AUTO: 1.15 K/UL (ref 1–4.8)
LYMPHOCYTES # BLD AUTO: 1.43 K/UL (ref 1–4.8)
LYMPHOCYTES NFR BLD AUTO: 13.9 % (ref 27–41)
LYMPHOCYTES NFR BLD AUTO: 20.4 % (ref 27–41)
LYMPHOCYTES NFR BLD AUTO: 21.3 % (ref 27–41)
LYMPHOCYTES NFR BLD AUTO: 22.8 % (ref 27–41)
LYMPHOCYTES NFR BLD AUTO: 29.5 % (ref 27–41)
LYMPHOCYTES NFR BLD AUTO: 31.4 % (ref 27–41)
LYMPHOCYTES NFR BLD AUTO: 45.1 % (ref 27–41)
LYMPHOCYTES NFR BLD AUTO: 6 % (ref 27–41)
MAGNESIUM SERPL-MCNC: 1.8 MG/DL (ref 1.7–2.3)
MCH RBC QN AUTO: 29.4 PG (ref 27–31)
MCH RBC QN AUTO: 29.7 PG (ref 27–31)
MCH RBC QN AUTO: 30 PG (ref 27–31)
MCH RBC QN AUTO: 30 PG (ref 27–31)
MCH RBC QN AUTO: 30.1 PG (ref 27–31)
MCH RBC QN AUTO: 30.1 PG (ref 27–31)
MCH RBC QN AUTO: 30.2 PG (ref 27–31)
MCH RBC QN AUTO: 30.3 PG (ref 27–31)
MCHC RBC AUTO-ENTMCNC: 30.4 G/DL (ref 32–36)
MCHC RBC AUTO-ENTMCNC: 30.9 G/DL (ref 32–36)
MCHC RBC AUTO-ENTMCNC: 31.1 G/DL (ref 32–36)
MCHC RBC AUTO-ENTMCNC: 31.2 G/DL (ref 32–36)
MCHC RBC AUTO-ENTMCNC: 31.2 G/DL (ref 32–36)
MCHC RBC AUTO-ENTMCNC: 32 G/DL (ref 32–36)
MCHC RBC AUTO-ENTMCNC: 33.3 G/DL (ref 32–36)
MCHC RBC AUTO-ENTMCNC: 33.6 G/DL (ref 32–36)
MCV RBC AUTO: 90.3 FL (ref 80–96)
MCV RBC AUTO: 90.6 FL (ref 80–96)
MCV RBC AUTO: 93.9 FL (ref 80–96)
MCV RBC AUTO: 94.1 FL (ref 80–96)
MCV RBC AUTO: 96.2 FL (ref 80–96)
MCV RBC AUTO: 96.7 FL (ref 80–96)
MCV RBC AUTO: 96.9 FL (ref 80–96)
MCV RBC AUTO: 97.7 FL (ref 80–96)
MONOCYTES # BLD AUTO: 0.26 K/UL (ref 0–0.8)
MONOCYTES # BLD AUTO: 0.31 K/UL (ref 0–0.8)
MONOCYTES # BLD AUTO: 0.32 K/UL (ref 0–0.8)
MONOCYTES # BLD AUTO: 0.33 K/UL (ref 0–0.8)
MONOCYTES # BLD AUTO: 0.37 K/UL (ref 0–0.8)
MONOCYTES # BLD AUTO: 0.39 K/UL (ref 0–0.8)
MONOCYTES # BLD AUTO: 0.45 K/UL (ref 0–0.8)
MONOCYTES # BLD AUTO: 0.82 K/UL (ref 0–0.8)
MONOCYTES NFR BLD AUTO: 10.4 % (ref 2–6)
MONOCYTES NFR BLD AUTO: 11.3 % (ref 2–6)
MONOCYTES NFR BLD AUTO: 4.8 % (ref 2–6)
MONOCYTES NFR BLD AUTO: 5.9 % (ref 2–6)
MONOCYTES NFR BLD AUTO: 6.5 % (ref 2–6)
MONOCYTES NFR BLD AUTO: 7.6 % (ref 2–6)
MONOCYTES NFR BLD AUTO: 8.5 % (ref 2–6)
MONOCYTES NFR BLD AUTO: 9.7 % (ref 2–6)
MPC BLD CALC-MCNC: 10 FL (ref 9.4–12.4)
MPC BLD CALC-MCNC: 10.6 FL (ref 9.4–12.4)
MPC BLD CALC-MCNC: 10.8 FL (ref 9.4–12.4)
MPC BLD CALC-MCNC: 11.1 FL (ref 9.4–12.4)
MPC BLD CALC-MCNC: 8.8 FL (ref 9.4–12.4)
MPC BLD CALC-MCNC: 9.3 FL (ref 9.4–12.4)
MPC BLD CALC-MCNC: 9.5 FL (ref 9.4–12.4)
MPC BLD CALC-MCNC: 9.7 FL (ref 9.4–12.4)
NEUTROPHILS # BLD AUTO: 1.22 K/UL (ref 1.8–7.7)
NEUTROPHILS # BLD AUTO: 1.97 K/UL (ref 1.8–7.7)
NEUTROPHILS # BLD AUTO: 10.99 K/UL (ref 1.8–7.7)
NEUTROPHILS # BLD AUTO: 2.19 K/UL (ref 1.8–7.7)
NEUTROPHILS # BLD AUTO: 2.5 K/UL (ref 1.8–7.7)
NEUTROPHILS # BLD AUTO: 2.99 K/UL (ref 1.8–7.7)
NEUTROPHILS # BLD AUTO: 3.58 K/UL (ref 1.8–7.7)
NEUTROPHILS # BLD AUTO: 5.29 K/UL (ref 1.8–7.7)
NEUTROPHILS NFR BLD AUTO: 38.5 % (ref 53–65)
NEUTROPHILS NFR BLD AUTO: 53.8 % (ref 53–65)
NEUTROPHILS NFR BLD AUTO: 57.1 % (ref 53–65)
NEUTROPHILS NFR BLD AUTO: 62.6 % (ref 53–65)
NEUTROPHILS NFR BLD AUTO: 68.1 % (ref 53–65)
NEUTROPHILS NFR BLD AUTO: 69.5 % (ref 53–65)
NEUTROPHILS NFR BLD AUTO: 79.6 % (ref 53–65)
NEUTROPHILS NFR BLD AUTO: 86.5 % (ref 53–65)
NITRITE UR QL STRIP: POSITIVE
NONHDLC SERPL-MCNC: 80 MG/DL
NRBC # BLD AUTO: 0 X10E3/UL
NRBC, AUTO (.00): 0 %
OCCULT BLOOD: POSITIVE
PH UR STRIP: 5 PH UNITS
PHOSPHATE SERPL-MCNC: 2.7 MG/DL (ref 2.5–4.5)
PLATELET # BLD AUTO: 220 K/UL (ref 150–400)
PLATELET # BLD AUTO: 221 K/UL (ref 150–400)
PLATELET # BLD AUTO: 222 K/UL (ref 150–400)
PLATELET # BLD AUTO: 222 K/UL (ref 150–400)
PLATELET # BLD AUTO: 233 K/UL (ref 150–400)
PLATELET # BLD AUTO: 313 K/UL (ref 150–400)
PLATELET # BLD AUTO: 367 K/UL (ref 150–400)
PLATELET # BLD AUTO: 452 K/UL (ref 150–400)
POTASSIUM SERPL-SCNC: 2.9 MMOL/L (ref 3.5–5.1)
POTASSIUM SERPL-SCNC: 3 MMOL/L (ref 3.5–5.1)
POTASSIUM SERPL-SCNC: 3.2 MMOL/L (ref 3.5–5.1)
POTASSIUM SERPL-SCNC: 3.4 MMOL/L (ref 3.5–5.1)
POTASSIUM SERPL-SCNC: 3.6 MMOL/L (ref 3.5–5.1)
POTASSIUM SERPL-SCNC: 3.9 MMOL/L (ref 3.5–5.1)
POTASSIUM SERPL-SCNC: 4 MMOL/L (ref 3.5–5.1)
POTASSIUM SERPL-SCNC: 4.2 MMOL/L (ref 3.5–5.1)
POTASSIUM SERPL-SCNC: 4.4 MMOL/L (ref 3.5–5.1)
POTASSIUM SERPL-SCNC: 4.6 MMOL/L (ref 3.5–5.1)
POTASSIUM SERPL-SCNC: 4.7 MMOL/L (ref 3.5–5.1)
PROT SERPL-MCNC: 6.5 G/DL (ref 6.4–8.2)
PROT SERPL-MCNC: 7.1 G/DL (ref 6.4–8.2)
PROT SERPL-MCNC: 7.4 G/DL (ref 6.4–8.2)
PROT SERPL-MCNC: 7.8 G/DL (ref 6.4–8.2)
PROT SERPL-MCNC: 8.3 G/DL (ref 6.4–8.2)
PROT SERPL-MCNC: 8.3 G/DL (ref 6.4–8.2)
PROT UR QL STRIP: ABNORMAL
RBC # BLD AUTO: 2.56 M/UL (ref 4.6–6.2)
RBC # BLD AUTO: 2.76 M/UL (ref 4.6–6.2)
RBC # BLD AUTO: 2.9 M/UL (ref 4.6–6.2)
RBC # BLD AUTO: 3.2 M/UL (ref 4.6–6.2)
RBC # BLD AUTO: 3.46 M/UL (ref 4.6–6.2)
RBC # BLD AUTO: 3.6 M/UL (ref 4.6–6.2)
RBC # BLD AUTO: 3.61 M/UL (ref 4.6–6.2)
RBC # BLD AUTO: 3.97 M/UL (ref 4.6–6.2)
RBC # UR STRIP: ABNORMAL /UL
RBC #/AREA URNS HPF: ABNORMAL /HPF
SARS-COV+SARS-COV-2 AG RESP QL IA.RAPID: NEGATIVE
SODIUM SERPL-SCNC: 132 MMOL/L (ref 136–145)
SODIUM SERPL-SCNC: 133 MMOL/L (ref 136–145)
SODIUM SERPL-SCNC: 134 MMOL/L (ref 136–145)
SODIUM SERPL-SCNC: 138 MMOL/L (ref 136–145)
SODIUM SERPL-SCNC: 138 MMOL/L (ref 136–145)
SODIUM SERPL-SCNC: 140 MMOL/L (ref 136–145)
SODIUM SERPL-SCNC: 142 MMOL/L (ref 136–145)
SODIUM SERPL-SCNC: 147 MMOL/L (ref 136–145)
SODIUM SERPL-SCNC: 156 MMOL/L (ref 136–145)
SODIUM SERPL-SCNC: 158 MMOL/L (ref 136–145)
SP GR UR STRIP: 1.02
SQUAMOUS #/AREA URNS LPF: ABNORMAL /LPF
TIBC SERPL-MCNC: 344 ΜG/DL (ref 250–450)
TRIGL SERPL-MCNC: 83 MG/DL (ref 35–150)
TSH SERPL DL<=0.005 MIU/L-ACNC: 1.9 UIU/ML (ref 0.36–3.74)
UA COMPLETE W REFLEX CULTURE PNL UR: ABNORMAL
UROBILINOGEN UR STRIP-ACNC: 0.2 MG/DL
VIT B12 SERPL-MCNC: 504 PG/ML (ref 193–986)
VLDLC SERPL-MCNC: 17 MG/DL
WBC # BLD AUTO: 12.69 K/UL (ref 4.5–11)
WBC # BLD AUTO: 3.17 K/UL (ref 4.5–11)
WBC # BLD AUTO: 3.66 K/UL (ref 4.5–11)
WBC # BLD AUTO: 3.83 K/UL (ref 4.5–11)
WBC # BLD AUTO: 3.99 K/UL (ref 4.5–11)
WBC # BLD AUTO: 4.39 K/UL (ref 4.5–11)
WBC # BLD AUTO: 5.15 K/UL (ref 4.5–11)
WBC # BLD AUTO: 6.64 K/UL (ref 4.5–11)
WBC #/AREA URNS HPF: ABNORMAL /HPF

## 2023-01-01 PROCEDURE — 63600175 PHARM REV CODE 636 W HCPCS: Performed by: EMERGENCY MEDICINE

## 2023-01-01 PROCEDURE — 83605 ASSAY OF LACTIC ACID: CPT | Performed by: FAMILY MEDICINE

## 2023-01-01 PROCEDURE — 99285 EMERGENCY DEPT VISIT HI MDM: CPT | Mod: 25

## 2023-01-01 PROCEDURE — 80053 COMPREHEN METABOLIC PANEL: CPT | Performed by: FAMILY MEDICINE

## 2023-01-01 PROCEDURE — 84100 ASSAY OF PHOSPHORUS: CPT | Performed by: NURSE PRACTITIONER

## 2023-01-01 PROCEDURE — 85025 COMPLETE CBC W/AUTO DIFF WBC: CPT | Performed by: NURSE PRACTITIONER

## 2023-01-01 PROCEDURE — 94761 N-INVAS EAR/PLS OXIMETRY MLT: CPT

## 2023-01-01 PROCEDURE — 27000958

## 2023-01-01 PROCEDURE — 85014 HEMATOCRIT: CPT | Performed by: INTERNAL MEDICINE

## 2023-01-01 PROCEDURE — 80053 COMPREHEN METABOLIC PANEL: CPT | Performed by: INTERNAL MEDICINE

## 2023-01-01 PROCEDURE — 99900035 HC TECH TIME PER 15 MIN (STAT)

## 2023-01-01 PROCEDURE — 63600175 PHARM REV CODE 636 W HCPCS: Performed by: FAMILY MEDICINE

## 2023-01-01 PROCEDURE — 80048 BASIC METABOLIC PNL TOTAL CA: CPT | Performed by: NURSE PRACTITIONER

## 2023-01-01 PROCEDURE — 27000981 HC MATTRESS, ACCUCAIR DAILY RENTAL

## 2023-01-01 PROCEDURE — 92610 EVALUATE SWALLOWING FUNCTION: CPT

## 2023-01-01 PROCEDURE — 99232 PR SUBSEQUENT HOSPITAL CARE,LEVL II: ICD-10-PCS | Mod: ,,, | Performed by: REGISTERED NURSE

## 2023-01-01 PROCEDURE — 43246 PR EGD, FLEX, W/PLCMT, GASTROSTOMY TUBE: ICD-10-PCS | Mod: ,,, | Performed by: INTERNAL MEDICINE

## 2023-01-01 PROCEDURE — 85025 COMPLETE CBC W/AUTO DIFF WBC: CPT | Performed by: FAMILY MEDICINE

## 2023-01-01 PROCEDURE — 83540 ASSAY OF IRON: CPT | Performed by: INTERNAL MEDICINE

## 2023-01-01 PROCEDURE — 25000003 PHARM REV CODE 250: Performed by: EMERGENCY MEDICINE

## 2023-01-01 PROCEDURE — 63600175 PHARM REV CODE 636 W HCPCS: Performed by: NURSE PRACTITIONER

## 2023-01-01 PROCEDURE — 11000001 HC ACUTE MED/SURG PRIVATE ROOM

## 2023-01-01 PROCEDURE — 25000003 PHARM REV CODE 250: Performed by: FAMILY MEDICINE

## 2023-01-01 PROCEDURE — 27201423 OPTIME MED/SURG SUP & DEVICES STERILE SUPPLY

## 2023-01-01 PROCEDURE — 25000003 PHARM REV CODE 250: Performed by: NURSE PRACTITIONER

## 2023-01-01 PROCEDURE — 27000982 HC MATTRESS, MATRIX LAL RENTAL

## 2023-01-01 PROCEDURE — 94760 N-INVAS EAR/PLS OXIMETRY 1: CPT

## 2023-01-01 PROCEDURE — 99307 PR NURSING FAC CARE, SUBSEQ, IMPROVING: ICD-10-PCS | Mod: ,,, | Performed by: EMERGENCY MEDICINE

## 2023-01-01 PROCEDURE — 99284 EMERGENCY DEPT VISIT MOD MDM: CPT

## 2023-01-01 PROCEDURE — 11000004 HC SNF PRIVATE

## 2023-01-01 PROCEDURE — A6250 SKIN SEAL PROTECT MOISTURIZR: HCPCS

## 2023-01-01 PROCEDURE — S5010 5% DEXTROSE AND 0.45% SALINE: HCPCS | Performed by: NURSE PRACTITIONER

## 2023-01-01 PROCEDURE — 99284 EMERGENCY DEPT VISIT MOD MDM: CPT | Mod: 25

## 2023-01-01 PROCEDURE — 99316 NF DSCHRG MGMT 30 MIN+: CPT | Mod: ,,, | Performed by: EMERGENCY MEDICINE

## 2023-01-01 PROCEDURE — 99284 PR EMERGENCY DEPT VISIT,LEVEL IV: ICD-10-PCS | Mod: ,,, | Performed by: FAMILY MEDICINE

## 2023-01-01 PROCEDURE — 37000009 HC ANESTHESIA EA ADD 15 MINS

## 2023-01-01 PROCEDURE — S5010 5% DEXTROSE AND 0.45% SALINE: HCPCS | Performed by: FAMILY MEDICINE

## 2023-01-01 PROCEDURE — 99223 1ST HOSP IP/OBS HIGH 75: CPT | Mod: AI,,, | Performed by: FAMILY MEDICINE

## 2023-01-01 PROCEDURE — 80048 BASIC METABOLIC PNL TOTAL CA: CPT | Performed by: EMERGENCY MEDICINE

## 2023-01-01 PROCEDURE — 25500020 PHARM REV CODE 255

## 2023-01-01 PROCEDURE — 43246 EGD PLACE GASTROSTOMY TUBE: CPT | Performed by: INTERNAL MEDICINE

## 2023-01-01 PROCEDURE — 85025 COMPLETE CBC W/AUTO DIFF WBC: CPT | Performed by: INTERNAL MEDICINE

## 2023-01-01 PROCEDURE — 84132 ASSAY OF SERUM POTASSIUM: CPT | Performed by: EMERGENCY MEDICINE

## 2023-01-01 PROCEDURE — 82746 ASSAY OF FOLIC ACID SERUM: CPT | Performed by: INTERNAL MEDICINE

## 2023-01-01 PROCEDURE — 63600175 PHARM REV CODE 636 W HCPCS: Performed by: PHYSICIAN ASSISTANT

## 2023-01-01 PROCEDURE — 83735 ASSAY OF MAGNESIUM: CPT | Performed by: NURSE PRACTITIONER

## 2023-01-01 PROCEDURE — 25500020 PHARM REV CODE 255: Performed by: NURSE PRACTITIONER

## 2023-01-01 PROCEDURE — 84443 ASSAY THYROID STIM HORMONE: CPT | Performed by: EMERGENCY MEDICINE

## 2023-01-01 PROCEDURE — D9220A PRA ANESTHESIA: Mod: ,,, | Performed by: NURSE ANESTHETIST, CERTIFIED REGISTERED

## 2023-01-01 PROCEDURE — D9220A PRA ANESTHESIA: ICD-10-PCS | Mod: ,,, | Performed by: NURSE ANESTHETIST, CERTIFIED REGISTERED

## 2023-01-01 PROCEDURE — 92526 ORAL FUNCTION THERAPY: CPT

## 2023-01-01 PROCEDURE — 99316 PR NURSING FAC DISCHRGE DAY,MORE 30 MIN: ICD-10-PCS | Mod: ,,, | Performed by: EMERGENCY MEDICINE

## 2023-01-01 PROCEDURE — 81001 URINALYSIS AUTO W/SCOPE: CPT | Performed by: FAMILY MEDICINE

## 2023-01-01 PROCEDURE — 99232 SBSQ HOSP IP/OBS MODERATE 35: CPT | Mod: ,,, | Performed by: REGISTERED NURSE

## 2023-01-01 PROCEDURE — 87040 BLOOD CULTURE FOR BACTERIA: CPT | Performed by: FAMILY MEDICINE

## 2023-01-01 PROCEDURE — 25000003 PHARM REV CODE 250: Performed by: NURSE ANESTHETIST, CERTIFIED REGISTERED

## 2023-01-01 PROCEDURE — 99284 EMERGENCY DEPT VISIT MOD MDM: CPT | Mod: ,,, | Performed by: FAMILY MEDICINE

## 2023-01-01 PROCEDURE — 96360 HYDRATION IV INFUSION INIT: CPT

## 2023-01-01 PROCEDURE — 27000284 HC CANNULA NASAL: Performed by: NURSE ANESTHETIST, CERTIFIED REGISTERED

## 2023-01-01 PROCEDURE — 27200966 HC CLOSED SUCTION SYSTEM

## 2023-01-01 PROCEDURE — 99283 PR EMERGENCY DEPT VISIT,LEVEL III: ICD-10-PCS | Mod: ,,, | Performed by: NURSE PRACTITIONER

## 2023-01-01 PROCEDURE — 87428 SARSCOV & INF VIR A&B AG IA: CPT | Performed by: FAMILY MEDICINE

## 2023-01-01 PROCEDURE — 63600175 PHARM REV CODE 636 W HCPCS: Performed by: NURSE ANESTHETIST, CERTIFIED REGISTERED

## 2023-01-01 PROCEDURE — 25000003 PHARM REV CODE 250: Performed by: PHYSICIAN ASSISTANT

## 2023-01-01 PROCEDURE — 80076 HEPATIC FUNCTION PANEL: CPT | Performed by: EMERGENCY MEDICINE

## 2023-01-01 PROCEDURE — 99223 PR INITIAL HOSPITAL CARE,LEVL III: ICD-10-PCS | Mod: AI,,, | Performed by: FAMILY MEDICINE

## 2023-01-01 PROCEDURE — 82962 GLUCOSE BLOOD TEST: CPT

## 2023-01-01 PROCEDURE — 87086 URINE CULTURE/COLONY COUNT: CPT | Performed by: FAMILY MEDICINE

## 2023-01-01 PROCEDURE — 99283 EMERGENCY DEPT VISIT LOW MDM: CPT | Mod: ,,, | Performed by: NURSE PRACTITIONER

## 2023-01-01 PROCEDURE — 27000716 HC OXISENSOR PROBE, ANY SIZE: Performed by: NURSE ANESTHETIST, CERTIFIED REGISTERED

## 2023-01-01 PROCEDURE — 99307 SBSQ NF CARE SF MDM 10: CPT | Mod: ,,, | Performed by: EMERGENCY MEDICINE

## 2023-01-01 PROCEDURE — 80061 LIPID PANEL: CPT | Performed by: INTERNAL MEDICINE

## 2023-01-01 PROCEDURE — 37000008 HC ANESTHESIA 1ST 15 MINUTES

## 2023-01-01 PROCEDURE — 74018 RADEX ABDOMEN 1 VIEW: CPT | Mod: TC

## 2023-01-01 PROCEDURE — 99283 EMERGENCY DEPT VISIT LOW MDM: CPT

## 2023-01-01 PROCEDURE — 85025 COMPLETE CBC W/AUTO DIFF WBC: CPT | Performed by: REGISTERED NURSE

## 2023-01-01 PROCEDURE — 82272 OCCULT BLD FECES 1-3 TESTS: CPT | Performed by: INTERNAL MEDICINE

## 2023-01-01 PROCEDURE — 43246 EGD PLACE GASTROSTOMY TUBE: CPT | Mod: ,,, | Performed by: INTERNAL MEDICINE

## 2023-01-01 PROCEDURE — 82306 VITAMIN D 25 HYDROXY: CPT | Performed by: EMERGENCY MEDICINE

## 2023-01-01 PROCEDURE — 83550 IRON BINDING TEST: CPT | Performed by: INTERNAL MEDICINE

## 2023-01-01 PROCEDURE — 87077 CULTURE AEROBIC IDENTIFY: CPT | Performed by: FAMILY MEDICINE

## 2023-01-01 PROCEDURE — 80048 BASIC METABOLIC PNL TOTAL CA: CPT | Performed by: REGISTERED NURSE

## 2023-01-01 RX ORDER — PANTOPRAZOLE SODIUM 40 MG/1
40 TABLET, DELAYED RELEASE ORAL 2 TIMES DAILY
Status: DISCONTINUED | OUTPATIENT
Start: 2023-01-01 | End: 2023-01-01

## 2023-01-01 RX ORDER — DOCUSATE SODIUM 283 MG/5ML
1 LIQUID RECTAL DAILY PRN
Status: CANCELLED | OUTPATIENT
Start: 2023-01-01

## 2023-01-01 RX ORDER — DOCUSATE SODIUM 100 MG/1
100 CAPSULE, LIQUID FILLED ORAL 2 TIMES DAILY
Status: DISCONTINUED | OUTPATIENT
Start: 2023-01-01 | End: 2023-01-01 | Stop reason: HOSPADM

## 2023-01-01 RX ORDER — DOCUSATE SODIUM 50 MG/5ML
100 LIQUID ORAL 2 TIMES DAILY
Status: DISCONTINUED | OUTPATIENT
Start: 2023-01-01 | End: 2023-01-01 | Stop reason: HOSPADM

## 2023-01-01 RX ORDER — ACETAMINOPHEN 325 MG/1
650 TABLET ORAL EVERY 6 HOURS PRN
Status: CANCELLED | OUTPATIENT
Start: 2023-01-01

## 2023-01-01 RX ORDER — ASCORBIC ACID 500 MG
500 TABLET ORAL 2 TIMES DAILY
Status: DISCONTINUED | OUTPATIENT
Start: 2023-01-01 | End: 2023-01-01 | Stop reason: HOSPADM

## 2023-01-01 RX ORDER — DOCUSATE SODIUM 100 MG/1
100 CAPSULE, LIQUID FILLED ORAL 2 TIMES DAILY
Status: DISCONTINUED | OUTPATIENT
Start: 2023-01-01 | End: 2023-01-01 | Stop reason: RX

## 2023-01-01 RX ORDER — TALC
6 POWDER (GRAM) TOPICAL NIGHTLY PRN
Status: CANCELLED | OUTPATIENT
Start: 2023-01-01

## 2023-01-01 RX ORDER — LIDOCAINE HYDROCHLORIDE 20 MG/ML
INJECTION, SOLUTION EPIDURAL; INFILTRATION; INTRACAUDAL; PERINEURAL
Status: DISCONTINUED | OUTPATIENT
Start: 2023-01-01 | End: 2023-01-01

## 2023-01-01 RX ORDER — AMLODIPINE BESYLATE 5 MG/1
5 TABLET ORAL DAILY
Status: DISCONTINUED | OUTPATIENT
Start: 2023-01-01 | End: 2023-01-01 | Stop reason: HOSPADM

## 2023-01-01 RX ORDER — MAG HYDROX/ALUMINUM HYD/SIMETH 200-200-20
30 SUSPENSION, ORAL (FINAL DOSE FORM) ORAL EVERY 6 HOURS PRN
Status: DISCONTINUED | OUTPATIENT
Start: 2023-01-01 | End: 2023-01-01 | Stop reason: HOSPADM

## 2023-01-01 RX ORDER — ENOXAPARIN SODIUM 100 MG/ML
30 INJECTION SUBCUTANEOUS EVERY 24 HOURS
Status: DISCONTINUED | OUTPATIENT
Start: 2023-01-01 | End: 2023-01-01 | Stop reason: HOSPADM

## 2023-01-01 RX ORDER — DEXTROSE MONOHYDRATE AND SODIUM CHLORIDE 5; .45 G/100ML; G/100ML
INJECTION, SOLUTION INTRAVENOUS CONTINUOUS
Status: DISCONTINUED | OUTPATIENT
Start: 2023-01-01 | End: 2023-01-01

## 2023-01-01 RX ORDER — POTASSIUM CHLORIDE 7.45 MG/ML
10 INJECTION INTRAVENOUS DAILY
Status: DISCONTINUED | OUTPATIENT
Start: 2023-01-01 | End: 2023-01-01 | Stop reason: HOSPADM

## 2023-01-01 RX ORDER — TALC
6 POWDER (GRAM) TOPICAL NIGHTLY
Status: DISCONTINUED | OUTPATIENT
Start: 2023-01-01 | End: 2023-01-01 | Stop reason: HOSPADM

## 2023-01-01 RX ORDER — LEVOFLOXACIN 5 MG/ML
500 INJECTION, SOLUTION INTRAVENOUS
Status: DISCONTINUED | OUTPATIENT
Start: 2023-01-01 | End: 2023-01-01

## 2023-01-01 RX ORDER — PROPOFOL 10 MG/ML
VIAL (ML) INTRAVENOUS
Status: DISCONTINUED | OUTPATIENT
Start: 2023-01-01 | End: 2023-01-01

## 2023-01-01 RX ORDER — ONDANSETRON 4 MG/1
4 TABLET, ORALLY DISINTEGRATING ORAL EVERY 8 HOURS PRN
Status: DISCONTINUED | OUTPATIENT
Start: 2023-01-01 | End: 2023-01-01 | Stop reason: HOSPADM

## 2023-01-01 RX ORDER — ETOMIDATE 2 MG/ML
INJECTION INTRAVENOUS
Status: DISCONTINUED | OUTPATIENT
Start: 2023-01-01 | End: 2023-01-01

## 2023-01-01 RX ORDER — POTASSIUM CHLORIDE 7.45 MG/ML
10 INJECTION INTRAVENOUS ONCE
Status: COMPLETED | OUTPATIENT
Start: 2023-01-01 | End: 2023-01-01

## 2023-01-01 RX ORDER — CEFAZOLIN SODIUM 1 G/3ML
INJECTION, POWDER, FOR SOLUTION INTRAMUSCULAR; INTRAVENOUS
Status: DISCONTINUED | OUTPATIENT
Start: 2023-01-01 | End: 2023-01-01

## 2023-01-01 RX ORDER — ADHESIVE BANDAGE
30 BANDAGE TOPICAL DAILY PRN
Status: DISCONTINUED | OUTPATIENT
Start: 2023-01-01 | End: 2023-01-01 | Stop reason: HOSPADM

## 2023-01-01 RX ORDER — POLYETHYLENE GLYCOL 3350 17 G/17G
17 POWDER, FOR SOLUTION ORAL
Status: CANCELLED | OUTPATIENT
Start: 2023-01-01

## 2023-01-01 RX ORDER — ENOXAPARIN SODIUM 100 MG/ML
30 INJECTION SUBCUTANEOUS EVERY 24 HOURS
Status: COMPLETED | OUTPATIENT
Start: 2023-01-01 | End: 2023-01-01

## 2023-01-01 RX ORDER — DEXTROSE MONOHYDRATE AND SODIUM CHLORIDE 5; .45 G/100ML; G/100ML
INJECTION, SOLUTION INTRAVENOUS CONTINUOUS
Status: DISCONTINUED | OUTPATIENT
Start: 2023-01-01 | End: 2023-01-01 | Stop reason: HOSPADM

## 2023-01-01 RX ORDER — FAMOTIDINE 20 MG/1
20 TABLET, FILM COATED ORAL 2 TIMES DAILY
COMMUNITY

## 2023-01-01 RX ORDER — MAG HYDROX/ALUMINUM HYD/SIMETH 200-200-20
15 SUSPENSION, ORAL (FINAL DOSE FORM) ORAL EVERY 6 HOURS PRN
Status: CANCELLED | OUTPATIENT
Start: 2023-01-01

## 2023-01-01 RX ORDER — MULTIVIT-MIN/FERROUS GLUCONATE 12 MG/15ML
15 LIQUID (ML) ORAL DAILY
Status: DISCONTINUED | OUTPATIENT
Start: 2023-01-01 | End: 2023-01-01 | Stop reason: HOSPADM

## 2023-01-01 RX ORDER — ACETAMINOPHEN 325 MG/1
650 TABLET ORAL EVERY 4 HOURS PRN
Status: DISCONTINUED | OUTPATIENT
Start: 2023-01-01 | End: 2023-01-01 | Stop reason: HOSPADM

## 2023-01-01 RX ORDER — FAMOTIDINE 20 MG/1
20 TABLET, FILM COATED ORAL 2 TIMES DAILY
Status: DISCONTINUED | OUTPATIENT
Start: 2023-01-01 | End: 2023-01-01 | Stop reason: HOSPADM

## 2023-01-01 RX ORDER — TALC
6 POWDER (GRAM) TOPICAL NIGHTLY PRN
Status: DISCONTINUED | OUTPATIENT
Start: 2023-01-01 | End: 2023-01-01 | Stop reason: HOSPADM

## 2023-01-01 RX ORDER — AMOXICILLIN 250 MG
1 CAPSULE ORAL 2 TIMES DAILY
Status: CANCELLED | OUTPATIENT
Start: 2023-01-01

## 2023-01-01 RX ORDER — SENNOSIDES 8.6 MG/1
8.6 TABLET ORAL DAILY PRN
Status: DISCONTINUED | OUTPATIENT
Start: 2023-01-01 | End: 2023-01-01 | Stop reason: HOSPADM

## 2023-01-01 RX ORDER — SULFAMETHOXAZOLE AND TRIMETHOPRIM 800; 160 MG/1; MG/1
1 TABLET ORAL 2 TIMES DAILY
Qty: 16 TABLET | Refills: 0 | Status: SHIPPED | OUTPATIENT
Start: 2023-01-01

## 2023-01-01 RX ORDER — ACETAMINOPHEN 500 MG
1000 TABLET ORAL EVERY 8 HOURS PRN
Status: DISCONTINUED | OUTPATIENT
Start: 2023-01-01 | End: 2023-01-01 | Stop reason: HOSPADM

## 2023-01-01 RX ORDER — FINASTERIDE 5 MG/1
5 TABLET, FILM COATED ORAL DAILY
Status: DISCONTINUED | OUTPATIENT
Start: 2023-01-01 | End: 2023-01-01

## 2023-01-01 RX ORDER — ENOXAPARIN SODIUM 100 MG/ML
40 INJECTION SUBCUTANEOUS EVERY 24 HOURS
Status: DISCONTINUED | OUTPATIENT
Start: 2023-01-01 | End: 2023-01-01 | Stop reason: HOSPADM

## 2023-01-01 RX ORDER — SODIUM CHLORIDE 0.9 % (FLUSH) 0.9 %
10 SYRINGE (ML) INJECTION
Status: DISCONTINUED | OUTPATIENT
Start: 2023-01-01 | End: 2023-01-01 | Stop reason: HOSPADM

## 2023-01-01 RX ADMIN — PANTOPRAZOLE SODIUM 40 MG: 40 TABLET, DELAYED RELEASE ORAL at 08:07

## 2023-01-01 RX ADMIN — POTASSIUM BICARBONATE 10 MEQ: 391 TABLET, EFFERVESCENT ORAL at 09:07

## 2023-01-01 RX ADMIN — PROPOFOL 25 MG: 10 INJECTION, EMULSION INTRAVENOUS at 12:07

## 2023-01-01 RX ADMIN — LEVOFLOXACIN 500 MG: 5 INJECTION, SOLUTION INTRAVENOUS at 04:07

## 2023-01-01 RX ADMIN — OXYCODONE HYDROCHLORIDE AND ACETAMINOPHEN 500 MG: 500 TABLET ORAL at 08:07

## 2023-01-01 RX ADMIN — ENOXAPARIN SODIUM 30 MG: 30 INJECTION SUBCUTANEOUS at 05:07

## 2023-01-01 RX ADMIN — DEXTROSE AND SODIUM CHLORIDE: 5; 450 INJECTION, SOLUTION INTRAVENOUS at 08:07

## 2023-01-01 RX ADMIN — CEFTRIAXONE SODIUM 1 G: 1 INJECTION, POWDER, FOR SOLUTION INTRAMUSCULAR; INTRAVENOUS at 08:07

## 2023-01-01 RX ADMIN — DEXTROSE AND SODIUM CHLORIDE: 5; 450 INJECTION, SOLUTION INTRAVENOUS at 11:07

## 2023-01-01 RX ADMIN — DEXTROSE AND SODIUM CHLORIDE: 5; 450 INJECTION, SOLUTION INTRAVENOUS at 03:07

## 2023-01-01 RX ADMIN — CEFTRIAXONE SODIUM 1 G: 1 INJECTION, POWDER, FOR SOLUTION INTRAMUSCULAR; INTRAVENOUS at 01:07

## 2023-01-01 RX ADMIN — DOCUSATE SODIUM LIQUID 100 MG: 100 LIQUID ORAL at 08:07

## 2023-01-01 RX ADMIN — SODIUM CHLORIDE 1000 ML: 9 INJECTION, SOLUTION INTRAVENOUS at 10:07

## 2023-01-01 RX ADMIN — AMLODIPINE BESYLATE 5 MG: 5 TABLET ORAL at 08:07

## 2023-01-01 RX ADMIN — MULTIVITAMIN TABLET 1 TABLET: TABLET at 08:07

## 2023-01-01 RX ADMIN — FINASTERIDE 5 MG: 5 TABLET, FILM COATED ORAL at 08:07

## 2023-01-01 RX ADMIN — AMLODIPINE BESYLATE 5 MG: 5 TABLET ORAL at 09:07

## 2023-01-01 RX ADMIN — DOCUSATE SODIUM LIQUID 100 MG: 100 LIQUID ORAL at 09:07

## 2023-01-01 RX ADMIN — ETOMIDATE 5 MG: 20 INJECTION, SOLUTION INTRAVENOUS at 12:07

## 2023-01-01 RX ADMIN — FAMOTIDINE 20 MG: 20 TABLET, FILM COATED ORAL at 08:07

## 2023-01-01 RX ADMIN — Medication 15 ML: at 09:07

## 2023-01-01 RX ADMIN — PROPOFOL 25 MG: 10 INJECTION, EMULSION INTRAVENOUS at 01:07

## 2023-01-01 RX ADMIN — CEFTRIAXONE SODIUM 1 G: 1 INJECTION, POWDER, FOR SOLUTION INTRAMUSCULAR; INTRAVENOUS at 12:07

## 2023-01-01 RX ADMIN — Medication 6 MG: at 08:07

## 2023-01-01 RX ADMIN — Medication 15 ML: at 08:07

## 2023-01-01 RX ADMIN — SODIUM CHLORIDE: 9 INJECTION, SOLUTION INTRAVENOUS at 12:07

## 2023-01-01 RX ADMIN — DIATRIZOATE MEGLUMINE AND DIATRIZOATE SODIUM 30 ML: 660; 100 LIQUID ORAL; RECTAL at 09:07

## 2023-01-01 RX ADMIN — DOCUSATE SODIUM 100 MG: 100 CAPSULE, LIQUID FILLED ORAL at 08:07

## 2023-01-01 RX ADMIN — DEXTROSE AND SODIUM CHLORIDE: 5; 450 INJECTION, SOLUTION INTRAVENOUS at 05:07

## 2023-01-01 RX ADMIN — OXYCODONE HYDROCHLORIDE AND ACETAMINOPHEN 500 MG: 500 TABLET ORAL at 09:07

## 2023-01-01 RX ADMIN — ACETAMINOPHEN 650 MG: 325 TABLET ORAL at 08:07

## 2023-01-01 RX ADMIN — POTASSIUM BICARBONATE 40 MEQ: 391 TABLET, EFFERVESCENT ORAL at 08:07

## 2023-01-01 RX ADMIN — DOCUSATE SODIUM 100 MG: 100 CAPSULE, LIQUID FILLED ORAL at 09:07

## 2023-01-01 RX ADMIN — LIDOCAINE HYDROCHLORIDE 100 MG: 20 INJECTION, SOLUTION INTRAVENOUS at 12:07

## 2023-01-01 RX ADMIN — DEXTROSE AND SODIUM CHLORIDE: 5; 450 INJECTION, SOLUTION INTRAVENOUS at 02:07

## 2023-01-01 RX ADMIN — DEXTROSE AND SODIUM CHLORIDE: 5; 450 INJECTION, SOLUTION INTRAVENOUS at 04:07

## 2023-01-01 RX ADMIN — ENOXAPARIN SODIUM 40 MG: 40 INJECTION SUBCUTANEOUS at 05:07

## 2023-01-01 RX ADMIN — POTASSIUM BICARBONATE 10 MEQ: 391 TABLET, EFFERVESCENT ORAL at 08:07

## 2023-01-01 RX ADMIN — DEXTROSE AND SODIUM CHLORIDE: 5; 450 INJECTION, SOLUTION INTRAVENOUS at 01:07

## 2023-01-01 RX ADMIN — DEXTROSE AND SODIUM CHLORIDE: 5; 450 INJECTION, SOLUTION INTRAVENOUS at 12:07

## 2023-01-01 RX ADMIN — DEXTROSE AND SODIUM CHLORIDE: 5; 450 INJECTION, SOLUTION INTRAVENOUS at 06:07

## 2023-01-01 RX ADMIN — POTASSIUM CHLORIDE 10 MEQ: 7.46 INJECTION, SOLUTION INTRAVENOUS at 08:07

## 2023-01-01 RX ADMIN — POTASSIUM CHLORIDE 10 MEQ: 7.46 INJECTION, SOLUTION INTRAVENOUS at 11:07

## 2023-01-01 RX ADMIN — CEFAZOLIN 2 G: 1 INJECTION, POWDER, FOR SOLUTION INTRAMUSCULAR; INTRAVENOUS; PARENTERAL at 01:07

## 2023-01-01 RX ADMIN — ENOXAPARIN SODIUM 30 MG: 30 INJECTION SUBCUTANEOUS at 04:07

## 2023-01-01 RX ADMIN — FAMOTIDINE 20 MG: 20 TABLET, FILM COATED ORAL at 09:07

## 2023-01-01 RX ADMIN — DEXTROSE AND SODIUM CHLORIDE: 5; 450 INJECTION, SOLUTION INTRAVENOUS at 09:07

## 2023-01-01 RX ADMIN — LEVOFLOXACIN 500 MG: 5 INJECTION, SOLUTION INTRAVENOUS at 05:07

## 2023-01-01 RX ADMIN — POTASSIUM CHLORIDE 10 MEQ: 7.46 INJECTION, SOLUTION INTRAVENOUS at 09:07

## 2023-01-01 RX ADMIN — ETOMIDATE 5 MG: 20 INJECTION, SOLUTION INTRAVENOUS at 01:07

## 2023-01-01 RX ADMIN — DIATRIZOATE MEGLUMINE AND DIATRIZOATE SODIUM 30 ML: 660; 100 LIQUID ORAL; RECTAL at 12:08

## 2023-02-04 ENCOUNTER — HOSPITAL ENCOUNTER (OUTPATIENT)
Facility: HOSPITAL | Age: 88
Discharge: HOME | End: 2023-02-05
Attending: EMERGENCY MEDICINE | Admitting: INTERNAL MEDICINE
Payer: MEDICARE

## 2023-02-04 DIAGNOSIS — K92.2 UGIB (UPPER GASTROINTESTINAL BLEED): Primary | ICD-10-CM

## 2023-02-04 DIAGNOSIS — R53.1 WEAKNESS: ICD-10-CM

## 2023-02-04 DIAGNOSIS — R07.9 CHEST PAIN: ICD-10-CM

## 2023-02-04 PROBLEM — M24.50 CONTRACTURE OF JOINT: Status: ACTIVE | Noted: 2023-02-04

## 2023-02-04 PROBLEM — I10 HTN (HYPERTENSION): Status: ACTIVE | Noted: 2023-02-04

## 2023-02-04 PROBLEM — N17.9 AKI (ACUTE KIDNEY INJURY): Status: ACTIVE | Noted: 2023-02-04

## 2023-02-04 PROBLEM — N39.0 UTI (URINARY TRACT INFECTION): Status: ACTIVE | Noted: 2023-02-04

## 2023-02-04 PROBLEM — F03.90 DEMENTIA: Status: ACTIVE | Noted: 2023-02-04

## 2023-02-04 LAB
ALBUMIN SERPL BCP-MCNC: 2.9 G/DL (ref 3.5–5)
ALBUMIN/GLOB SERPL: 0.7 {RATIO}
ALP SERPL-CCNC: 78 U/L (ref 45–115)
ALT SERPL W P-5'-P-CCNC: 20 U/L (ref 16–61)
ANION GAP SERPL CALCULATED.3IONS-SCNC: 12 MMOL/L (ref 7–16)
APTT PPP: 31 SECONDS (ref 25.2–37.3)
APTT PPP: 31.2 SECONDS (ref 25.2–37.3)
AST SERPL W P-5'-P-CCNC: 20 U/L (ref 15–37)
BACTERIA #/AREA URNS HPF: ABNORMAL /HPF
BASOPHILS # BLD AUTO: 0.04 K/UL (ref 0–0.2)
BASOPHILS NFR BLD AUTO: 0.6 % (ref 0–1)
BILIRUB SERPL-MCNC: 0.3 MG/DL (ref ?–1.2)
BILIRUB UR QL STRIP: NEGATIVE
BUN SERPL-MCNC: 37 MG/DL (ref 7–18)
BUN/CREAT SERPL: 21 (ref 6–20)
CALCIUM SERPL-MCNC: 8.8 MG/DL (ref 8.5–10.1)
CHLORIDE SERPL-SCNC: 106 MMOL/L (ref 98–107)
CLARITY UR: ABNORMAL
CO2 SERPL-SCNC: 27 MMOL/L (ref 21–32)
COLOR UR: YELLOW
CREAT SERPL-MCNC: 1.73 MG/DL (ref 0.7–1.3)
DIFFERENTIAL METHOD BLD: ABNORMAL
EGFR (NO RACE VARIABLE) (RUSH/TITUS): 37 ML/MIN/1.73M²
EOSINOPHIL # BLD AUTO: 0.25 K/UL (ref 0–0.5)
EOSINOPHIL NFR BLD AUTO: 3.9 % (ref 1–4)
ERYTHROCYTE [DISTWIDTH] IN BLOOD BY AUTOMATED COUNT: 15.1 % (ref 11.5–14.5)
FERRITIN SERPL-MCNC: 48 NG/ML (ref 26–388)
FOLATE SERPL-MCNC: 7.5 NG/ML (ref 3.1–17.5)
GLOBULIN SER-MCNC: 4.2 G/DL (ref 2–4)
GLUCOSE SERPL-MCNC: 104 MG/DL (ref 74–106)
GLUCOSE SERPL-MCNC: 89 MG/DL (ref 70–105)
GLUCOSE UR STRIP-MCNC: NORMAL MG/DL
HCT VFR BLD AUTO: 27.2 % (ref 40–54)
HCT VFR BLD AUTO: 28 % (ref 40–54)
HCT VFR BLD AUTO: 28.1 % (ref 40–54)
HCT VFR BLD AUTO: 28.3 % (ref 40–54)
HCT VFR BLD AUTO: 28.8 % (ref 40–54)
HGB BLD-MCNC: 8.8 G/DL (ref 13.5–18)
HGB BLD-MCNC: 8.9 G/DL (ref 13.5–18)
HGB BLD-MCNC: 8.9 G/DL (ref 13.5–18)
HGB BLD-MCNC: 9.3 G/DL (ref 13.5–18)
HGB BLD-MCNC: 9.5 G/DL (ref 13.5–18)
IMM GRANULOCYTES # BLD AUTO: 0.04 K/UL (ref 0–0.04)
IMM GRANULOCYTES NFR BLD: 0.6 % (ref 0–0.4)
INR BLD: 1.14
INR BLD: 1.2
IRON SATN MFR SERPL: 7 % (ref 14–50)
IRON SERPL-MCNC: 19 ΜG/DL (ref 65–175)
KETONES UR STRIP-SCNC: NEGATIVE MG/DL
LEUKOCYTE ESTERASE UR QL STRIP: ABNORMAL
LYMPHOCYTES # BLD AUTO: 1.35 K/UL (ref 1–4.8)
LYMPHOCYTES NFR BLD AUTO: 20.8 % (ref 27–41)
MAGNESIUM SERPL-MCNC: 2.4 MG/DL (ref 1.7–2.3)
MCH RBC QN AUTO: 30.4 PG (ref 27–31)
MCHC RBC AUTO-ENTMCNC: 31.8 G/DL (ref 32–36)
MCV RBC AUTO: 95.6 FL (ref 80–96)
MONOCYTES # BLD AUTO: 0.51 K/UL (ref 0–0.8)
MONOCYTES NFR BLD AUTO: 7.9 % (ref 2–6)
MPC BLD CALC-MCNC: 9.4 FL (ref 9.4–12.4)
NEUTROPHILS # BLD AUTO: 4.29 K/UL (ref 1.8–7.7)
NEUTROPHILS NFR BLD AUTO: 66.2 % (ref 53–65)
NITRITE UR QL STRIP: NEGATIVE
NRBC # BLD AUTO: 0 X10E3/UL
NRBC, AUTO (.00): 0 %
PH UR STRIP: 6 PH UNITS
PLATELET # BLD AUTO: 299 K/UL (ref 150–400)
POTASSIUM SERPL-SCNC: 4.5 MMOL/L (ref 3.5–5.1)
PROT SERPL-MCNC: 7.1 G/DL (ref 6.4–8.2)
PROT UR QL STRIP: 100
PROTHROMBIN TIME: 14.2 SECONDS (ref 11.7–14.7)
PROTHROMBIN TIME: 14.7 SECONDS (ref 11.7–14.7)
RBC # BLD AUTO: 2.93 M/UL (ref 4.6–6.2)
RBC # UR STRIP: ABNORMAL /UL
RBC #/AREA URNS HPF: ABNORMAL /HPF
SARS-COV-2 RDRP RESP QL NAA+PROBE: NEGATIVE
SODIUM SERPL-SCNC: 140 MMOL/L (ref 136–145)
SP GR UR STRIP: 1.02
SQUAMOUS #/AREA URNS LPF: ABNORMAL /LPF
TIBC SERPL-MCNC: 266 ΜG/DL (ref 250–450)
TROPONIN I SERPL HS-MCNC: 24.3 PG/ML
UROBILINOGEN UR STRIP-ACNC: NORMAL MG/DL
VIT B12 SERPL-MCNC: 367 PG/ML (ref 193–986)
WBC # BLD AUTO: 6.48 K/UL (ref 4.5–11)
WBC #/AREA URNS HPF: ABNORMAL /HPF

## 2023-02-04 PROCEDURE — 82746 ASSAY OF FOLIC ACID SERUM: CPT | Performed by: INTERNAL MEDICINE

## 2023-02-04 PROCEDURE — 85014 HEMATOCRIT: CPT | Mod: 91 | Performed by: INTERNAL MEDICINE

## 2023-02-04 PROCEDURE — 83540 ASSAY OF IRON: CPT | Performed by: INTERNAL MEDICINE

## 2023-02-04 PROCEDURE — 80053 COMPREHEN METABOLIC PANEL: CPT | Performed by: EMERGENCY MEDICINE

## 2023-02-04 PROCEDURE — 87635 SARS-COV-2 COVID-19 AMP PRB: CPT | Performed by: INTERNAL MEDICINE

## 2023-02-04 PROCEDURE — 83735 ASSAY OF MAGNESIUM: CPT | Performed by: EMERGENCY MEDICINE

## 2023-02-04 PROCEDURE — 87040 BLOOD CULTURE FOR BACTERIA: CPT | Performed by: INTERNAL MEDICINE

## 2023-02-04 PROCEDURE — 83550 IRON BINDING TEST: CPT | Performed by: INTERNAL MEDICINE

## 2023-02-04 PROCEDURE — 85025 COMPLETE CBC W/AUTO DIFF WBC: CPT | Performed by: EMERGENCY MEDICINE

## 2023-02-04 PROCEDURE — 85610 PROTHROMBIN TIME: CPT | Mod: 91 | Performed by: INTERNAL MEDICINE

## 2023-02-04 PROCEDURE — G0378 HOSPITAL OBSERVATION PER HR: HCPCS

## 2023-02-04 PROCEDURE — 85730 THROMBOPLASTIN TIME PARTIAL: CPT | Mod: 91 | Performed by: INTERNAL MEDICINE

## 2023-02-04 PROCEDURE — 96376 TX/PRO/DX INJ SAME DRUG ADON: CPT

## 2023-02-04 PROCEDURE — 82728 ASSAY OF FERRITIN: CPT | Performed by: INTERNAL MEDICINE

## 2023-02-04 PROCEDURE — 96361 HYDRATE IV INFUSION ADD-ON: CPT

## 2023-02-04 PROCEDURE — 63600175 PHARM REV CODE 636 W HCPCS: Performed by: EMERGENCY MEDICINE

## 2023-02-04 PROCEDURE — 99284 PR EMERGENCY DEPT VISIT,LEVEL IV: ICD-10-PCS | Mod: ,,, | Performed by: EMERGENCY MEDICINE

## 2023-02-04 PROCEDURE — 63600175 PHARM REV CODE 636 W HCPCS: Performed by: INTERNAL MEDICINE

## 2023-02-04 PROCEDURE — 84484 ASSAY OF TROPONIN QUANT: CPT | Performed by: EMERGENCY MEDICINE

## 2023-02-04 PROCEDURE — 25000003 PHARM REV CODE 250: Performed by: EMERGENCY MEDICINE

## 2023-02-04 PROCEDURE — 99284 EMERGENCY DEPT VISIT MOD MDM: CPT | Mod: ,,, | Performed by: EMERGENCY MEDICINE

## 2023-02-04 PROCEDURE — 99223 PR INITIAL HOSPITAL CARE,LEVL III: ICD-10-PCS | Mod: AI,,, | Performed by: INTERNAL MEDICINE

## 2023-02-04 PROCEDURE — 93010 EKG 12-LEAD: ICD-10-PCS | Mod: ,,, | Performed by: STUDENT IN AN ORGANIZED HEALTH CARE EDUCATION/TRAINING PROGRAM

## 2023-02-04 PROCEDURE — 85610 PROTHROMBIN TIME: CPT | Performed by: EMERGENCY MEDICINE

## 2023-02-04 PROCEDURE — 99223 1ST HOSP IP/OBS HIGH 75: CPT | Mod: AI,,, | Performed by: INTERNAL MEDICINE

## 2023-02-04 PROCEDURE — 87077 CULTURE AEROBIC IDENTIFY: CPT | Performed by: EMERGENCY MEDICINE

## 2023-02-04 PROCEDURE — 96375 TX/PRO/DX INJ NEW DRUG ADDON: CPT

## 2023-02-04 PROCEDURE — 93010 ELECTROCARDIOGRAM REPORT: CPT | Mod: ,,, | Performed by: STUDENT IN AN ORGANIZED HEALTH CARE EDUCATION/TRAINING PROGRAM

## 2023-02-04 PROCEDURE — 87086 URINE CULTURE/COLONY COUNT: CPT | Performed by: EMERGENCY MEDICINE

## 2023-02-04 PROCEDURE — 96365 THER/PROPH/DIAG IV INF INIT: CPT

## 2023-02-04 PROCEDURE — C9113 INJ PANTOPRAZOLE SODIUM, VIA: HCPCS | Performed by: INTERNAL MEDICINE

## 2023-02-04 PROCEDURE — 93005 ELECTROCARDIOGRAM TRACING: CPT

## 2023-02-04 PROCEDURE — 82962 GLUCOSE BLOOD TEST: CPT

## 2023-02-04 PROCEDURE — 81001 URINALYSIS AUTO W/SCOPE: CPT | Performed by: EMERGENCY MEDICINE

## 2023-02-04 PROCEDURE — 85730 THROMBOPLASTIN TIME PARTIAL: CPT | Performed by: EMERGENCY MEDICINE

## 2023-02-04 PROCEDURE — 25000003 PHARM REV CODE 250: Performed by: INTERNAL MEDICINE

## 2023-02-04 PROCEDURE — 99285 EMERGENCY DEPT VISIT HI MDM: CPT | Mod: 25

## 2023-02-04 RX ORDER — GLUCAGON 1 MG
1 KIT INJECTION
Status: DISCONTINUED | OUTPATIENT
Start: 2023-02-04 | End: 2023-02-05 | Stop reason: HOSPADM

## 2023-02-04 RX ORDER — NIFEDIPINE 30 MG/1
30 TABLET, EXTENDED RELEASE ORAL DAILY
Status: DISCONTINUED | OUTPATIENT
Start: 2023-02-04 | End: 2023-02-05

## 2023-02-04 RX ORDER — HYDRALAZINE HYDROCHLORIDE 25 MG/1
25 TABLET, FILM COATED ORAL EVERY 8 HOURS
Status: DISCONTINUED | OUTPATIENT
Start: 2023-02-04 | End: 2023-02-05

## 2023-02-04 RX ORDER — BISACODYL 10 MG
10 SUPPOSITORY, RECTAL RECTAL DAILY PRN
Status: DISCONTINUED | OUTPATIENT
Start: 2023-02-04 | End: 2023-02-05 | Stop reason: HOSPADM

## 2023-02-04 RX ORDER — TALC
6 POWDER (GRAM) TOPICAL NIGHTLY
Status: ON HOLD | COMMUNITY
End: 2023-01-01 | Stop reason: HOSPADM

## 2023-02-04 RX ORDER — NIFEDIPINE 30 MG/1
30 TABLET, EXTENDED RELEASE ORAL DAILY
Status: ON HOLD | COMMUNITY
End: 2023-02-14 | Stop reason: HOSPADM

## 2023-02-04 RX ORDER — NAPROXEN SODIUM 220 MG/1
81 TABLET, FILM COATED ORAL DAILY
Status: ON HOLD | COMMUNITY
End: 2023-02-14 | Stop reason: HOSPADM

## 2023-02-04 RX ORDER — MELOXICAM 15 MG/1
15 TABLET ORAL DAILY
Status: ON HOLD | COMMUNITY
End: 2023-02-06 | Stop reason: HOSPADM

## 2023-02-04 RX ORDER — ACETAMINOPHEN 500 MG
1000 TABLET ORAL EVERY 8 HOURS PRN
Status: ON HOLD | COMMUNITY
End: 2023-01-01 | Stop reason: HOSPADM

## 2023-02-04 RX ORDER — POLYETHYLENE GLYCOL 3350 17 G/17G
17 POWDER, FOR SOLUTION ORAL DAILY
Status: DISCONTINUED | OUTPATIENT
Start: 2023-02-04 | End: 2023-02-05 | Stop reason: HOSPADM

## 2023-02-04 RX ORDER — MAG HYDROX/ALUMINUM HYD/SIMETH 200-200-20
30 SUSPENSION, ORAL (FINAL DOSE FORM) ORAL 4 TIMES DAILY PRN
Status: DISCONTINUED | OUTPATIENT
Start: 2023-02-04 | End: 2023-02-05 | Stop reason: HOSPADM

## 2023-02-04 RX ORDER — TALC
6 POWDER (GRAM) TOPICAL NIGHTLY PRN
Status: DISCONTINUED | OUTPATIENT
Start: 2023-02-04 | End: 2023-02-05

## 2023-02-04 RX ORDER — SODIUM CHLORIDE, SODIUM GLUCONATE, SODIUM ACETATE, POTASSIUM CHLORIDE AND MAGNESIUM CHLORIDE 30; 37; 368; 526; 502 MG/100ML; MG/100ML; MG/100ML; MG/100ML; MG/100ML
INJECTION, SOLUTION INTRAVENOUS CONTINUOUS
Status: DISPENSED | OUTPATIENT
Start: 2023-02-04 | End: 2023-02-05

## 2023-02-04 RX ORDER — DOCUSATE SODIUM 100 MG/1
100 CAPSULE, LIQUID FILLED ORAL 2 TIMES DAILY
COMMUNITY

## 2023-02-04 RX ORDER — ACETAMINOPHEN 325 MG/1
650 TABLET ORAL EVERY 4 HOURS PRN
Status: DISCONTINUED | OUTPATIENT
Start: 2023-02-04 | End: 2023-02-05 | Stop reason: HOSPADM

## 2023-02-04 RX ORDER — CALCIUM CARBONATE 600 MG
600 TABLET ORAL 2 TIMES DAILY WITH MEALS
Status: ON HOLD | COMMUNITY
End: 2023-02-06 | Stop reason: CLARIF

## 2023-02-04 RX ORDER — TALC
3 POWDER (GRAM) TOPICAL NIGHTLY
Status: DISCONTINUED | OUTPATIENT
Start: 2023-02-04 | End: 2023-02-05

## 2023-02-04 RX ORDER — ONDANSETRON 2 MG/ML
4 INJECTION INTRAMUSCULAR; INTRAVENOUS EVERY 8 HOURS PRN
Status: DISCONTINUED | OUTPATIENT
Start: 2023-02-04 | End: 2023-02-05

## 2023-02-04 RX ORDER — NALOXONE HCL 0.4 MG/ML
0.02 VIAL (ML) INJECTION
Status: DISCONTINUED | OUTPATIENT
Start: 2023-02-04 | End: 2023-02-05 | Stop reason: HOSPADM

## 2023-02-04 RX ORDER — FINASTERIDE 5 MG/1
5 TABLET, FILM COATED ORAL DAILY
COMMUNITY

## 2023-02-04 RX ORDER — PANTOPRAZOLE SODIUM 40 MG/10ML
40 INJECTION, POWDER, LYOPHILIZED, FOR SOLUTION INTRAVENOUS 2 TIMES DAILY
Status: DISCONTINUED | OUTPATIENT
Start: 2023-02-04 | End: 2023-02-05

## 2023-02-04 RX ORDER — SIMETHICONE 80 MG
1 TABLET,CHEWABLE ORAL 4 TIMES DAILY PRN
Status: DISCONTINUED | OUTPATIENT
Start: 2023-02-04 | End: 2023-02-05 | Stop reason: HOSPADM

## 2023-02-04 RX ORDER — AMOXICILLIN 250 MG
1 CAPSULE ORAL 2 TIMES DAILY
Status: DISCONTINUED | OUTPATIENT
Start: 2023-02-04 | End: 2023-02-05 | Stop reason: HOSPADM

## 2023-02-04 RX ORDER — ACETAMINOPHEN 325 MG/1
650 TABLET ORAL EVERY 8 HOURS PRN
Status: DISCONTINUED | OUTPATIENT
Start: 2023-02-04 | End: 2023-02-05 | Stop reason: HOSPADM

## 2023-02-04 RX ORDER — FINASTERIDE 5 MG/1
5 TABLET, FILM COATED ORAL DAILY
Status: DISCONTINUED | OUTPATIENT
Start: 2023-02-04 | End: 2023-02-05

## 2023-02-04 RX ADMIN — POLYETHYLENE GLYCOL 3350 17 G: 17 POWDER, FOR SOLUTION ORAL at 03:02

## 2023-02-04 RX ADMIN — THERA TABS 1 TABLET: TAB at 03:02

## 2023-02-04 RX ADMIN — PANTOPRAZOLE SODIUM 40 MG: 40 INJECTION, POWDER, FOR SOLUTION INTRAVENOUS at 09:02

## 2023-02-04 RX ADMIN — SENNOSIDES AND DOCUSATE SODIUM 1 TABLET: 50; 8.6 TABLET ORAL at 09:02

## 2023-02-04 RX ADMIN — PANTOPRAZOLE SODIUM 40 MG: 40 INJECTION, POWDER, FOR SOLUTION INTRAVENOUS at 12:02

## 2023-02-04 RX ADMIN — NIFEDIPINE 30 MG: 30 TABLET, FILM COATED, EXTENDED RELEASE ORAL at 03:02

## 2023-02-04 RX ADMIN — Medication 3 MG: at 09:02

## 2023-02-04 RX ADMIN — SENNOSIDES AND DOCUSATE SODIUM 1 TABLET: 50; 8.6 TABLET ORAL at 12:02

## 2023-02-04 RX ADMIN — HYDRALAZINE HYDROCHLORIDE 25 MG: 25 TABLET ORAL at 09:02

## 2023-02-04 RX ADMIN — CEFTRIAXONE SODIUM 2 G: 2 INJECTION, POWDER, FOR SOLUTION INTRAMUSCULAR; INTRAVENOUS at 09:02

## 2023-02-04 RX ADMIN — SODIUM CHLORIDE, SODIUM GLUCONATE, SODIUM ACETATE, POTASSIUM CHLORIDE AND MAGNESIUM CHLORIDE: 526; 502; 368; 37; 30 INJECTION, SOLUTION INTRAVENOUS at 03:02

## 2023-02-04 RX ADMIN — FINASTERIDE 5 MG: 5 TABLET, FILM COATED ORAL at 03:02

## 2023-02-04 NOTE — ASSESSMENT & PLAN NOTE
Patient with acute kidney injury likely due to IVVD/dehydration CLOTILDE is currently stable. Labs reviewed- Renal function/electrolytes with CrCl cannot be calculated (Unknown ideal weight.). according to latest data. Monitor urine output and serial BMP and adjust therapy as needed. Avoid nephrotoxins and renally dose meds for GFR listed above.

## 2023-02-04 NOTE — ED TRIAGE NOTES
PATIENT PRESENTS FROM [facility] FOR REPORT OF COFFEE GROUND EMESIS THAT WAS NOTICED THIS AM AROUND 5AM BY CNA.

## 2023-02-04 NOTE — ED PROVIDER NOTES
"Encounter Date: 2/4/2023       History     Chief Complaint   Patient presents with    Hematemesis     88 y/o male sent from nursing home where he reportedly had "coffee-ground" emesis this morning.  Family is presents.  Family member resports that at baseline patient does no talk / communicate.  He has to be assisted with eating / drinking.      Review of patient's allergies indicates:  No Known Allergies  Past Medical History:   Diagnosis Date    Alzheimer's disease, unspecified (CODE)     Hypertension      History reviewed. No pertinent surgical history.  History reviewed. No pertinent family history.  Social History     Tobacco Use    Smoking status: Unknown     Review of Systems   Unable to perform ROS: Dementia     Physical Exam     Initial Vitals   BP Pulse Resp Temp SpO2   02/04/23 0710 02/04/23 0700 02/04/23 0700 02/04/23 0700 02/04/23 0700   (!) 143/64 (!) 56 16 98.1 °F (36.7 °C) 99 %      MAP       --                Physical Exam    Nursing note and vitals reviewed.  Constitutional: He appears well-developed and well-nourished.   HENT:   Head: Normocephalic and atraumatic.   Nose: Nose normal.   Mouth/Throat: Oropharynx is clear and moist.   Eyes: Conjunctivae and EOM are normal. Pupils are equal, round, and reactive to light.   Neck: Neck supple.   Normal range of motion.  Cardiovascular:  Normal rate, regular rhythm, normal heart sounds and intact distal pulses.           Pulmonary/Chest: Breath sounds normal.   Abdominal: Abdomen is soft. Bowel sounds are normal.   Musculoskeletal:         General: Normal range of motion.      Cervical back: Normal range of motion and neck supple.     Neurological: He is alert. He has normal strength. GCS eye subscore is 4. GCS verbal subscore is 5. GCS motor subscore is 6.   Appears to be awake, but he is not particularly responsive.     Skin: Skin is warm and dry. Capillary refill takes less than 2 seconds.       Medical Screening Exam   See Full Note    ED Course "   Procedures  Labs Reviewed   CULTURE, URINE - Abnormal; Notable for the following components:       Result Value    Culture, Urine >100,000 Streptococcus agalactiae (Group B) (*)     All other components within normal limits   COMPREHENSIVE METABOLIC PANEL - Abnormal; Notable for the following components:    BUN 37 (*)     Creatinine 1.73 (*)     BUN/Creatinine Ratio 21 (*)     Albumin 2.9 (*)     Globulin 4.2 (*)     eGFR 37 (*)     All other components within normal limits   URINALYSIS, REFLEX TO URINE CULTURE - Abnormal; Notable for the following components:    Leukocytes, UA Large (*)     Protein,  (*)     Blood, UA Moderate (*)     All other components within normal limits   MAGNESIUM - Abnormal; Notable for the following components:    Magnesium 2.4 (*)     All other components within normal limits   CBC WITH DIFFERENTIAL - Abnormal; Notable for the following components:    RBC 2.93 (*)     Hemoglobin 8.9 (*)     Hematocrit 28.0 (*)     MCHC 31.8 (*)     RDW 15.1 (*)     Neutrophils % 66.2 (*)     Lymphocytes % 20.8 (*)     Monocytes % 7.9 (*)     Immature Granulocytes % 0.6 (*)     All other components within normal limits   URINALYSIS, MICROSCOPIC - Abnormal; Notable for the following components:    WBC, UA Too Numerous To Count (*)     Bacteria, UA Many (*)     Squamous Epithelial Cells, UA Occasional (*)     All other components within normal limits   HEMOGLOBIN AND HEMATOCRIT, BLOOD - Abnormal; Notable for the following components:    Hemoglobin 8.9 (*)     Hematocrit 28.1 (*)     All other components within normal limits   HEMOGLOBIN AND HEMATOCRIT, BLOOD - Abnormal; Notable for the following components:    Hemoglobin 8.8 (*)     Hematocrit 27.2 (*)     All other components within normal limits   IRON AND TIBC - Abnormal; Notable for the following components:    Iron 19 (*)     Iron Saturation 7 (*)     All other components within normal limits   HEMOGLOBIN AND HEMATOCRIT, BLOOD - Abnormal;  Notable for the following components:    Hemoglobin 9.5 (*)     Hematocrit 28.8 (*)     All other components within normal limits   HEMOGLOBIN AND HEMATOCRIT, BLOOD - Abnormal; Notable for the following components:    Hemoglobin 9.3 (*)     Hematocrit 28.3 (*)     All other components within normal limits   COMPREHENSIVE METABOLIC PANEL - Abnormal; Notable for the following components:    BUN 34 (*)     Creatinine 1.33 (*)     BUN/Creatinine Ratio 26 (*)     Albumin 2.8 (*)     Globulin 4.3 (*)     eGFR 51 (*)     All other components within normal limits   MAGNESIUM - Abnormal; Notable for the following components:    Magnesium 2.5 (*)     All other components within normal limits   CBC WITH DIFFERENTIAL - Abnormal; Notable for the following components:    RBC 2.93 (*)     Hemoglobin 9.0 (*)     Hematocrit 28.5 (*)     MCV 97.3 (*)     MCHC 31.6 (*)     RDW 15.5 (*)     Lymphocytes % 23.0 (*)     Monocytes % 8.6 (*)     Eosinophils % 5.3 (*)     Immature Granulocytes % 0.6 (*)     All other components within normal limits   HEMOGLOBIN AND HEMATOCRIT, BLOOD - Abnormal; Notable for the following components:    Hemoglobin 9.8 (*)     Hematocrit 29.9 (*)     All other components within normal limits   APTT - Normal   PROTIME-INR - Normal   TROPONIN I - Normal   PROTIME-INR - Normal   APTT - Normal   SARS-COV-2 RNA AMPLIFICATION, QUAL - Normal    Narrative:     Negative SARS-CoV results should not be used as the sole basis for treatment or patient management decisions; negative results should be considered in the context of a patient's recent exposures, history and the presene of clinical signs and symptoms consistent with COVID-19.  Negative results should be treated as presumptive and confirmed by molecular assay, if necessary for patient management.   FERRITIN - Normal   VITAMIN B12/FOLATE, SERUM PANEL - Normal   PHOSPHORUS - Normal   CULTURE, BLOOD   CULTURE, BLOOD   CBC W/ AUTO DIFFERENTIAL    Narrative:     The  following orders were created for panel order CBC auto differential.  Procedure                               Abnormality         Status                     ---------                               -----------         ------                     CBC with Differential[[id number]]        Abnormal            Final result                 Please view results for these tests on the individual orders.   CBC W/ AUTO DIFFERENTIAL    Narrative:     The following orders were created for panel order CBC with Automated Differential.  Procedure                               Abnormality         Status                     ---------                               -----------         ------                     CBC with Differential[[id number]]        Abnormal            Final result                 Please view results for these tests on the individual orders.   POCT GLUCOSE MONITORING CONTINUOUS   POCT GLUCOSE MONITORING CONTINUOUS   POCT GLUCOSE MONITORING CONTINUOUS        ECG Results              EKG 12-lead (Final result)  Result time 03/01/23 16:01:39      Final result by Kunal, Lab In [facility] (03/01/23 16:01:39)                   Narrative:    Test Reason : R53.1,    Vent. Rate : 052 BPM     Atrial Rate : 000 BPM     P-R Int : 082 ms          QRS Dur : 100 ms      QT Int : 458 ms       P-R-T Axes : 039 -33 -26 degrees     QTc Int : 444 ms    Sinus rhythm  Short OR interval  Possible left anterior fascicular block  Cannot rule out septal infarct - age undetermined  Left ventricular hypertrophy  Inferior/lateral T wave abnormality  may be due to the hypertrophy and/or  ischemia  Abnormal ECG    Confirmed by Danitza GENTILE, [place] DEL CASTILLO ([id number]) on 3/1/2023 4:01:30 PM    Referred By: [email or web address]   SELF           Confirmed By:Denver Bosch MD                                  Imaging Results              US Lower Extremity Veins Bilateral (Final result)  Result time 02/04/23 14:36:32      Final result by Alvaro Bryant DO (02/04/23  14:36:32)                   Impression:      No evidence of deep venous thrombosis in either lower extremity.    Point of Service: [facility]      Electronically signed by: Alvaro Bryant  Date:    02/04/2023  Time:    14:36               Narrative:    EXAMINATION:  US LOWER EXTREMITY VEINS BILATERAL    CLINICAL HISTORY:  swelling.;    COMPARISON:  None.    TECHNIQUE:  Grayscale, spectral, and color Doppler interrogation of the bilateral lower extremity veins was performed. Augmentation and compression was performed.    FINDINGS:  Grayscale, color Doppler, and pulsed Doppler evaluation of the veins of the bilateral lower extremity demonstrate no evidence of deep venous thrombosis.                                       X-Ray Chest AP Portable (Final result)  Result time 02/04/23 08:12:54      Final result by Alvaro Bryant DO (02/04/23 08:12:54)                   Impression:      No acute cardiopulmonary process demonstrated.    Point of Service: [facility]      Electronically signed by: Alvaro Bryant  Date:    02/04/2023  Time:    08:12               Narrative:    EXAMINATION:  XR CHEST AP PORTABLE    CLINICAL HISTORY:  Weakness    COMPARISON:  Chest x-ray September 10, 2016    TECHNIQUE:  Frontal view/views of the chest.    FINDINGS:  The cardiomediastinal silhouette is stable in configuration.  Chronic change of the lungs without focal consolidation, pleural effusion, or pneumothorax.  Visualized osseous and surrounding soft tissue structures appear grossly unchanged.  Dextroconvex curvature of the thoracic spine.                                       Medications   electrolyte-A infusion (0 mL/hr Intravenous Stopped 2/5/23 0453)   cefTRIAXone (ROCEPHIN) 2 g in dextrose 5 % in water (D5W) 5 % 50 mL IVPB (MB+) (0 g Intravenous Stopped 2/4/23 1102)                       Clinical Impression:   Final diagnoses:  [R53.1] Weakness        ED Disposition Condition    Observation                  Burt Serrano MD  03/09/23 5489

## 2023-02-04 NOTE — H&P
[facility] - Emergency Department  [facility] Medicine  History & Physical    Patient Name: Jt Corrigan  MRN: [id number]  Patient Class: OP- Observation  Admission Date: 2/4/2023  Attending Physician: Joslyn De Leon MD   Primary Care Provider: Husam Bernard MD (Inactive)         Patient information was obtained from relative(s) and ER records.     Subjective:     Principal Problem:UGIB (upper gastrointestinal bleed)    Chief Complaint:   Chief Complaint   Patient presents with    Hematemesis        HPI: Patient is 89-year-old male with past medical history of hypertension, severe dementia resident at a nursing home, who presents to the emergency room from nursing home for coffee-ground emesis.  Patient is nonverbal, he can not give any history due to his advanced dementia, his daughter is at bedside who is giving most of the history.  According to the daughter nursing home staff noticed coffee-ground emesis on the patient's pillow, and therefore sent the patient to the emergency room for further workup concerning for upper GI bleed.  In the emergency room patient was found to have negative FOBT on stool, however his H&H did drop from his baseline.  Of note his creatinine is also elevated from his baseline.  Patient's urine analysis also was concerning for urinary tract infection therefore he received Rocephin in the ER.  Review of symptoms and social history can not be obtained.  Per daughter patient has been eating pureed food on his own, however looking at the patient I am unsure how that could be done, therefore we will get a swallow eval as well.  Patient is a full code per daughter.of note pt has muliple contractures, which daughter states have been present for a while.       Past Medical History:   Diagnosis Date    Alzheimer's disease, unspecified (CODE)     Hypertension        History reviewed. No pertinent surgical history.    Review of patient's allergies indicates:  No Known  Allergies    No current facility-administered medications on file prior to encounter.     Current Outpatient Medications on File Prior to Encounter   Medication Sig    acetaminophen (TYLENOL) 500 MG tablet Take 1,000 mg by mouth every 8 (eight) hours as needed for Pain.    aspirin 81 MG Chew Take 81 mg by mouth once daily.    calcium carbonate (OS-ELIZA) 600 mg calcium (1,500 mg) Tab Take 600 mg by mouth 2 (two) times daily with meals.    docusate sodium (COLACE) 100 MG capsule Take 100 mg by mouth 2 (two) times daily.    finasteride (PROSCAR) 5 mg tablet Take 5 mg by mouth once daily.    melatonin 3 mg TbSR Take 6 mg by mouth every evening.    meloxicam (MOBIC) 15 MG tablet Take 15 mg by mouth once daily.    multivitamin with minerals tablet Take 1 tablet by mouth once daily.    NIFEdipine (PROCARDIA XL) 30 MG (OSM) 24 hr tablet Take 30 mg by mouth once daily.     Family History    None       Tobacco Use    Smoking status: Unknown    Smokeless tobacco: Not on file   Substance and Sexual Activity    Alcohol use: Not on file    Drug use: Not on file    Sexual activity: Not on file     Review of Systems   Unable to perform ROS: Dementia   Objective:     Vital Signs (Most Recent):  Temp: 98.1 °F (36.7 °C) (02/04/23 0700)  Pulse: (!) 59 (02/04/23 1110)  Resp: 13 (02/04/23 1110)  BP: (!) 163/62 (02/04/23 1110)  SpO2: 100 % (02/04/23 1110)   Vital Signs (24h Range):  Temp:  [98.1 °F (36.7 °C)] 98.1 °F (36.7 °C)  Pulse:  [52-60] 59  Resp:  [13-16] 13  SpO2:  [99 %-100 %] 100 %  BP: (119-164)/(47-81) 163/62        There is no height or weight on file to calculate BMI.    Physical Exam  Constitutional:       General: He is not in acute distress.     Appearance: He is not toxic-appearing.   HENT:      Head: Normocephalic.      Mouth/Throat:      Mouth: Mucous membranes are dry.   Cardiovascular:      Rate and Rhythm: Normal rate.   Pulmonary:      Effort: Pulmonary effort is normal. No respiratory distress.    Abdominal:      General: Bowel sounds are normal.      Palpations: Abdomen is soft.   Musculoskeletal:      Right lower leg: No edema.      Left lower leg: No edema.      Comments: L leg bigger than right.    Skin:     General: Skin is dry.   Neurological:      Mental Status: Mental status is at baseline.           Significant Labs: All pertinent labs within the past 24 hours have been reviewed.    Significant Imaging: I have reviewed all pertinent imaging results/findings within the past 24 hours.    Assessment/Plan:     * UGIB (upper gastrointestinal bleed)  Coffe ground emesis at NH  Pt on asa and meloxicam at home, c/f gastric ulcer>bleeding  fobt -ve  Cannon start on iv ppi  Consult gi  q6 h/h   tx >7 hb  Check anemia pannel   Tele monitoring  IV 2 large bore      CLOTILDE (acute kidney injury)  Patient with acute kidney injury likely due to IVVD/dehydration CLOTILDE is currently stable. Labs reviewed- Renal function/electrolytes with CrCl cannot be calculated (Unknown ideal weight.). according to latest data. Monitor urine output and serial BMP and adjust therapy as needed. Avoid nephrotoxins and renally dose meds for GFR listed above.       HTN (hypertension)    Cont procardia      Contracture of joint    Noted on admission  At advance stage.       UTI (urinary tract infection)    Follow cx  tx w/ rocephen emperically      Dementia    Baseline  Poor prognosis d/t advance dementia.       VTE Risk Mitigation (From admission, onward)         Ordered     Reason for No Pharmacological VTE Prophylaxis  Once        Question:  Reasons:  Answer:  Active Bleeding    02/04/23 1154     IP VTE HIGH RISK PATIENT  Once         02/04/23 1154     Place sequential compression device  Until discontinued         02/04/23 1154                   Rehmat MICHAEL De Leon MD  Department of Hospital Medicine   [facility] - Emergency Department

## 2023-02-04 NOTE — SUBJECTIVE & OBJECTIVE
Past Medical History:   Diagnosis Date    Alzheimer's disease, unspecified (CODE)     Hypertension        History reviewed. No pertinent surgical history.    Review of patient's allergies indicates:  No Known Allergies    No current facility-administered medications on file prior to encounter.     Current Outpatient Medications on File Prior to Encounter   Medication Sig    acetaminophen (TYLENOL) 500 MG tablet Take 1,000 mg by mouth every 8 (eight) hours as needed for Pain.    aspirin 81 MG Chew Take 81 mg by mouth once daily.    calcium carbonate (OS-ELIZA) 600 mg calcium (1,500 mg) Tab Take 600 mg by mouth 2 (two) times daily with meals.    docusate sodium (COLACE) 100 MG capsule Take 100 mg by mouth 2 (two) times daily.    finasteride (PROSCAR) 5 mg tablet Take 5 mg by mouth once daily.    melatonin 3 mg TbSR Take 6 mg by mouth every evening.    meloxicam (MOBIC) 15 MG tablet Take 15 mg by mouth once daily.    multivitamin with minerals tablet Take 1 tablet by mouth once daily.    NIFEdipine (PROCARDIA XL) 30 MG (OSM) 24 hr tablet Take 30 mg by mouth once daily.     Family History    None       Tobacco Use    Smoking status: Unknown    Smokeless tobacco: Not on file   Substance and Sexual Activity    Alcohol use: Not on file    Drug use: Not on file    Sexual activity: Not on file     Review of Systems   Unable to perform ROS: Dementia   Objective:     Vital Signs (Most Recent):  Temp: 98.1 °F (36.7 °C) (02/04/23 0700)  Pulse: (!) 59 (02/04/23 1110)  Resp: 13 (02/04/23 1110)  BP: (!) 163/62 (02/04/23 1110)  SpO2: 100 % (02/04/23 1110)   Vital Signs (24h Range):  Temp:  [98.1 °F (36.7 °C)] 98.1 °F (36.7 °C)  Pulse:  [52-60] 59  Resp:  [13-16] 13  SpO2:  [99 %-100 %] 100 %  BP: (119-164)/(47-81) 163/62        There is no height or weight on file to calculate BMI.    Physical Exam  Constitutional:       General: He is not in acute distress.     Appearance: He is not toxic-appearing.   HENT:      Head: Normocephalic.       Mouth/Throat:      Mouth: Mucous membranes are dry.   Cardiovascular:      Rate and Rhythm: Normal rate.   Pulmonary:      Effort: Pulmonary effort is normal. No respiratory distress.   Abdominal:      General: Bowel sounds are normal.      Palpations: Abdomen is soft.   Musculoskeletal:      Right lower leg: No edema.      Left lower leg: No edema.      Comments: L leg bigger than right.    Skin:     General: Skin is dry.   Neurological:      Mental Status: Mental status is at baseline.           Significant Labs: All pertinent labs within the past 24 hours have been reviewed.    Significant Imaging: I have reviewed all pertinent imaging results/findings within the past 24 hours.

## 2023-02-04 NOTE — HPI
Patient is 89-year-old male with past medical history of hypertension, severe dementia resident at a nursing home, who presents to the emergency room from nursing home for coffee-ground emesis.  Patient is nonverbal, he can not give any history due to his advanced dementia, his daughter is at bedside who is giving most of the history.  According to the daughter nursing home staff noticed coffee-ground emesis on the patient's pillow, and therefore sent the patient to the emergency room for further workup concerning for upper GI bleed.  In the emergency room patient was found to have negative FOBT on stool, however his H&H did drop from his baseline.  Of note his creatinine is also elevated from his baseline.  Patient's urine analysis also was concerning for urinary tract infection therefore he received Rocephin in the ER.  Review of symptoms and social history can not be obtained.  Per daughter patient has been eating pureed food on his own, however looking at the patient I am unsure how that could be done, therefore we will get a swallow eval as well.  Patient is a full code per daughter.of note pt has muliple contractures, which daughter states have been present for a while.

## 2023-02-04 NOTE — ASSESSMENT & PLAN NOTE
Coffe ground emesis at NH  Pt on asa and meloxicam at home, c/f gastric ulcer>bleeding  fobt -ve  Cannon start on iv ppi  Consult gi  q6 h/h   tx >7 hb  Check anemia pannel   Tele monitoring  IV 2 large bore

## 2023-02-05 ENCOUNTER — HOSPITAL ENCOUNTER (INPATIENT)
Facility: HOSPITAL | Age: 88
LOS: 9 days | Discharge: NURSING, REHAB OR LONG-TERM CARE | DRG: 378 | End: 2023-02-14
Attending: INTERNAL MEDICINE | Admitting: INTERNAL MEDICINE
Payer: MEDICARE

## 2023-02-05 VITALS
DIASTOLIC BLOOD PRESSURE: 72 MMHG | HEART RATE: 52 BPM | OXYGEN SATURATION: 99 % | SYSTOLIC BLOOD PRESSURE: 118 MMHG | RESPIRATION RATE: 16 BRPM | TEMPERATURE: 98 F

## 2023-02-05 DIAGNOSIS — K92.2 GI BLEED: ICD-10-CM

## 2023-02-05 DIAGNOSIS — K92.2 UGIB (UPPER GASTROINTESTINAL BLEED): Primary | ICD-10-CM

## 2023-02-05 LAB
ALBUMIN SERPL BCP-MCNC: 2.8 G/DL (ref 3.5–5)
ALBUMIN/GLOB SERPL: 0.7 {RATIO}
ALP SERPL-CCNC: 71 U/L (ref 45–115)
ALT SERPL W P-5'-P-CCNC: 17 U/L (ref 16–61)
ANION GAP SERPL CALCULATED.3IONS-SCNC: 15 MMOL/L (ref 7–16)
AST SERPL W P-5'-P-CCNC: 23 U/L (ref 15–37)
BASOPHILS # BLD AUTO: 0.04 K/UL (ref 0–0.2)
BASOPHILS NFR BLD AUTO: 0.8 % (ref 0–1)
BILIRUB SERPL-MCNC: 0.4 MG/DL (ref ?–1.2)
BUN SERPL-MCNC: 34 MG/DL (ref 7–18)
BUN/CREAT SERPL: 26 (ref 6–20)
CALCIUM SERPL-MCNC: 9 MG/DL (ref 8.5–10.1)
CHLORIDE SERPL-SCNC: 105 MMOL/L (ref 98–107)
CO2 SERPL-SCNC: 26 MMOL/L (ref 21–32)
CREAT SERPL-MCNC: 1.33 MG/DL (ref 0.7–1.3)
DIFFERENTIAL METHOD BLD: ABNORMAL
EGFR (NO RACE VARIABLE) (RUSH/TITUS): 51 ML/MIN/1.73M²
EOSINOPHIL # BLD AUTO: 0.27 K/UL (ref 0–0.5)
EOSINOPHIL NFR BLD AUTO: 5.3 % (ref 1–4)
ERYTHROCYTE [DISTWIDTH] IN BLOOD BY AUTOMATED COUNT: 15.5 % (ref 11.5–14.5)
GLOBULIN SER-MCNC: 4.3 G/DL (ref 2–4)
GLUCOSE SERPL-MCNC: 102 MG/DL (ref 70–105)
GLUCOSE SERPL-MCNC: 92 MG/DL (ref 74–106)
GLUCOSE SERPL-MCNC: 96 MG/DL (ref 70–105)
HCT VFR BLD AUTO: 28.5 % (ref 40–54)
HCT VFR BLD AUTO: 29.9 % (ref 40–54)
HGB BLD-MCNC: 9 G/DL (ref 13.5–18)
HGB BLD-MCNC: 9.8 G/DL (ref 13.5–18)
IMM GRANULOCYTES # BLD AUTO: 0.03 K/UL (ref 0–0.04)
IMM GRANULOCYTES NFR BLD: 0.6 % (ref 0–0.4)
LYMPHOCYTES # BLD AUTO: 1.18 K/UL (ref 1–4.8)
LYMPHOCYTES NFR BLD AUTO: 23 % (ref 27–41)
MAGNESIUM SERPL-MCNC: 2.5 MG/DL (ref 1.7–2.3)
MCH RBC QN AUTO: 30.7 PG (ref 27–31)
MCHC RBC AUTO-ENTMCNC: 31.6 G/DL (ref 32–36)
MCV RBC AUTO: 97.3 FL (ref 80–96)
MONOCYTES # BLD AUTO: 0.44 K/UL (ref 0–0.8)
MONOCYTES NFR BLD AUTO: 8.6 % (ref 2–6)
MPC BLD CALC-MCNC: 9.6 FL (ref 9.4–12.4)
NEUTROPHILS # BLD AUTO: 3.17 K/UL (ref 1.8–7.7)
NEUTROPHILS NFR BLD AUTO: 61.7 % (ref 53–65)
NRBC # BLD AUTO: 0 X10E3/UL
NRBC, AUTO (.00): 0 %
PHOSPHATE SERPL-MCNC: 3.7 MG/DL (ref 2.5–4.5)
PLATELET # BLD AUTO: 296 K/UL (ref 150–400)
POTASSIUM SERPL-SCNC: 4.7 MMOL/L (ref 3.5–5.1)
PROT SERPL-MCNC: 7.1 G/DL (ref 6.4–8.2)
RBC # BLD AUTO: 2.93 M/UL (ref 4.6–6.2)
SODIUM SERPL-SCNC: 141 MMOL/L (ref 136–145)
WBC # BLD AUTO: 5.13 K/UL (ref 4.5–11)

## 2023-02-05 PROCEDURE — 92610 EVALUATE SWALLOWING FUNCTION: CPT

## 2023-02-05 PROCEDURE — 83735 ASSAY OF MAGNESIUM: CPT | Performed by: INTERNAL MEDICINE

## 2023-02-05 PROCEDURE — 11000001 HC ACUTE MED/SURG PRIVATE ROOM

## 2023-02-05 PROCEDURE — 99232 PR SUBSEQUENT HOSPITAL CARE,LEVL II: ICD-10-PCS | Mod: ,,, | Performed by: INTERNAL MEDICINE

## 2023-02-05 PROCEDURE — G0378 HOSPITAL OBSERVATION PER HR: HCPCS

## 2023-02-05 PROCEDURE — 84100 ASSAY OF PHOSPHORUS: CPT | Performed by: INTERNAL MEDICINE

## 2023-02-05 PROCEDURE — 63600175 PHARM REV CODE 636 W HCPCS: Performed by: INTERNAL MEDICINE

## 2023-02-05 PROCEDURE — 80053 COMPREHEN METABOLIC PANEL: CPT | Performed by: INTERNAL MEDICINE

## 2023-02-05 PROCEDURE — 25000003 PHARM REV CODE 250: Performed by: INTERNAL MEDICINE

## 2023-02-05 PROCEDURE — 96366 THER/PROPH/DIAG IV INF ADDON: CPT

## 2023-02-05 PROCEDURE — S5010 5% DEXTROSE AND 0.45% SALINE: HCPCS | Performed by: INTERNAL MEDICINE

## 2023-02-05 PROCEDURE — 85014 HEMATOCRIT: CPT | Mod: 59 | Performed by: INTERNAL MEDICINE

## 2023-02-05 PROCEDURE — C9113 INJ PANTOPRAZOLE SODIUM, VIA: HCPCS | Performed by: INTERNAL MEDICINE

## 2023-02-05 PROCEDURE — 99232 SBSQ HOSP IP/OBS MODERATE 35: CPT | Mod: ,,, | Performed by: INTERNAL MEDICINE

## 2023-02-05 PROCEDURE — 85025 COMPLETE CBC W/AUTO DIFF WBC: CPT | Performed by: INTERNAL MEDICINE

## 2023-02-05 PROCEDURE — 96376 TX/PRO/DX INJ SAME DRUG ADON: CPT

## 2023-02-05 PROCEDURE — 82962 GLUCOSE BLOOD TEST: CPT | Mod: 91

## 2023-02-05 RX ORDER — DOCUSATE SODIUM 100 MG/1
100 CAPSULE, LIQUID FILLED ORAL 2 TIMES DAILY
Status: DISCONTINUED | OUTPATIENT
Start: 2023-02-05 | End: 2023-02-07

## 2023-02-05 RX ORDER — PANTOPRAZOLE SODIUM 40 MG/10ML
40 INJECTION, POWDER, LYOPHILIZED, FOR SOLUTION INTRAVENOUS 2 TIMES DAILY
Status: DISCONTINUED | OUTPATIENT
Start: 2023-02-05 | End: 2023-02-14 | Stop reason: HOSPADM

## 2023-02-05 RX ORDER — PANTOPRAZOLE SODIUM 40 MG/10ML
40 INJECTION, POWDER, LYOPHILIZED, FOR SOLUTION INTRAVENOUS 2 TIMES DAILY
Status: DISCONTINUED | OUTPATIENT
Start: 2023-02-05 | End: 2023-02-05

## 2023-02-05 RX ORDER — ACETAMINOPHEN 325 MG/1
650 TABLET ORAL EVERY 8 HOURS PRN
Status: DISCONTINUED | OUTPATIENT
Start: 2023-02-05 | End: 2023-02-14 | Stop reason: HOSPADM

## 2023-02-05 RX ORDER — ACETAMINOPHEN 325 MG/1
650 TABLET ORAL EVERY 4 HOURS PRN
Status: DISCONTINUED | OUTPATIENT
Start: 2023-02-05 | End: 2023-02-14 | Stop reason: HOSPADM

## 2023-02-05 RX ORDER — TALC
3 POWDER (GRAM) TOPICAL NIGHTLY
Status: DISCONTINUED | OUTPATIENT
Start: 2023-02-05 | End: 2023-02-14 | Stop reason: HOSPADM

## 2023-02-05 RX ORDER — NIFEDIPINE 30 MG/1
30 TABLET, EXTENDED RELEASE ORAL DAILY
Status: DISCONTINUED | OUTPATIENT
Start: 2023-02-05 | End: 2023-02-08

## 2023-02-05 RX ORDER — DEXTROSE MONOHYDRATE AND SODIUM CHLORIDE 5; .45 G/100ML; G/100ML
INJECTION, SOLUTION INTRAVENOUS CONTINUOUS
Status: DISPENSED | OUTPATIENT
Start: 2023-02-05 | End: 2023-02-06

## 2023-02-05 RX ORDER — FINASTERIDE 5 MG/1
5 TABLET, FILM COATED ORAL DAILY
Status: DISCONTINUED | OUTPATIENT
Start: 2023-02-05 | End: 2023-02-14 | Stop reason: HOSPADM

## 2023-02-05 RX ORDER — FAMOTIDINE 10 MG/ML
20 INJECTION INTRAVENOUS 2 TIMES DAILY
Status: DISCONTINUED | OUTPATIENT
Start: 2023-02-05 | End: 2023-02-05

## 2023-02-05 RX ORDER — ONDANSETRON 4 MG/1
8 TABLET, ORALLY DISINTEGRATING ORAL EVERY 8 HOURS PRN
Status: DISCONTINUED | OUTPATIENT
Start: 2023-02-05 | End: 2023-02-14 | Stop reason: HOSPADM

## 2023-02-05 RX ADMIN — PANTOPRAZOLE SODIUM 40 MG: 40 INJECTION, POWDER, LYOPHILIZED, FOR SOLUTION INTRAVENOUS at 09:02

## 2023-02-05 RX ADMIN — FINASTERIDE 5 MG: 5 TABLET, FILM COATED ORAL at 03:02

## 2023-02-05 RX ADMIN — DOCUSATE SODIUM 100 MG: 100 CAPSULE, LIQUID FILLED ORAL at 09:02

## 2023-02-05 RX ADMIN — DOCUSATE SODIUM 100 MG: 100 CAPSULE, LIQUID FILLED ORAL at 03:02

## 2023-02-05 RX ADMIN — CEFTRIAXONE SODIUM 1 G: 1 INJECTION, POWDER, FOR SOLUTION INTRAMUSCULAR; INTRAVENOUS at 12:02

## 2023-02-05 RX ADMIN — CEFTRIAXONE SODIUM 1 G: 1 INJECTION, POWDER, FOR SOLUTION INTRAMUSCULAR; INTRAVENOUS at 09:02

## 2023-02-05 RX ADMIN — PANTOPRAZOLE SODIUM 40 MG: 40 INJECTION, POWDER, FOR SOLUTION INTRAVENOUS at 09:02

## 2023-02-05 RX ADMIN — NIFEDIPINE 30 MG: 30 TABLET, FILM COATED, EXTENDED RELEASE ORAL at 03:02

## 2023-02-05 RX ADMIN — DEXTROSE AND SODIUM CHLORIDE: 5; 450 INJECTION, SOLUTION INTRAVENOUS at 10:02

## 2023-02-05 RX ADMIN — THERA TABS 1 TABLET: TAB at 03:02

## 2023-02-05 RX ADMIN — MELATONIN 3 MG: at 09:02

## 2023-02-05 NOTE — DISCHARGE SUMMARY
[facility] - Emergency Department  Hospital Medicine  Discharge Summary      Patient Name: Jt Corrigan  MRN: [id number]  MANJEET: [id number]  Patient Class: OP- Observation  Admission Date: 2/4/2023  Hospital Length of Stay: 0 days  Discharge Date and Time:  02/05/2023 11:18 AM  Attending Physician: Joslyn De Leon MD   Discharging Provider: Joslyn De Leon MD  Primary Care Provider: Husam Bernard MD (Inactive)    Primary Care Team: Networked reference to record PCT     HPI:   Patient is 89-year-old male with past medical history of hypertension, severe dementia resident at a nursing home, who presents to the emergency room from nursing home for coffee-ground emesis.  Patient is nonverbal, he can not give any history due to his advanced dementia, his daughter is at bedside who is giving most of the history.  According to the daughter nursing home staff noticed coffee-ground emesis on the patient's pillow, and therefore sent the patient to the emergency room for further workup concerning for upper GI bleed.  In the emergency room patient was found to have negative FOBT on stool, however his H&H did drop from his baseline.  Of note his creatinine is also elevated from his baseline.  Patient's urine analysis also was concerning for urinary tract infection therefore he received Rocephin in the ER.  Review of symptoms and social history can not be obtained.  Per daughter patient has been eating pureed food on his own, however looking at the patient I am unsure how that could be done, therefore we will get a swallow eval as well.  Patient is a full code per daughter.of note pt has muliple contractures, which daughter states have been present for a while.       * No surgery found *      Hospital Course:   For a more detailed hospital course see IP notes briefly, pt was a/w uti and suspected UGIB, gi to see pt. D/t no bed availability at [facility], pt will be transferred to [facility] for further care ie tx of UGIB and uti.        Goals of Care Treatment Preferences:  Code Status: Full Code      Consults:   Consults (From admission, onward)        Status Ordering Provider     Inpatient consult to Social Work  Once        Provider:  (Not yet assigned)    Ordered JOAQUINA SHUKLAT U     Inpatient consult to Gastroenterology  Once        Provider:  JYOTI Reynoso MD    Acknowledged GAYLA, REHMAT U          No new Assessment & Plan notes have been filed under this hospital service since the last note was generated.  Service: Hospital Medicine    Final Active Diagnoses:    Diagnosis Date Noted POA    PRINCIPAL PROBLEM:  UGIB (upper gastrointestinal bleed) [K92.2] 02/04/2023 Unknown    Dementia [F03.90] 02/04/2023 Unknown    UTI (urinary tract infection) [N39.0] 02/04/2023 Unknown    Contracture of joint [M24.50] 02/04/2023 Unknown    HTN (hypertension) [I10] 02/04/2023 Unknown    CLOTILDE (acute kidney injury) [N17.9] 02/04/2023 Unknown      Problems Resolved During this Admission:       Discharged Condition: stable    Disposition: Long Term Acute Care    Follow Up:    Patient Instructions:   No discharge procedures on file.    Significant Diagnostic Studies: Labs: All labs within the past 24 hours have been reviewed    Pending Diagnostic Studies:     Procedure Component Value Units Date/Time    EKG 12-lead [[id number]] Collected: 02/04/23 0833    Order Status: Sent Lab Status: In process Updated: 02/04/23 0834    Narrative:      Test Reason : R53.1,    Vent. Rate : 052 BPM     Atrial Rate : 000 BPM     P-R Int : 082 ms          QRS Dur : 100 ms      QT Int : 458 ms       P-R-T Axes : 039 -33 -26 degrees     QTc Int : 444 ms    Sinus rhythm  Short MO interval  Possible left anterior fascicular block  Cannot rule out septal infarct - age undetermined  Left ventricular hypertrophy  Inferior/lateral T wave abnormality  may be due to the hypertrophy and/or  ischemia  Abnormal ECG      Referred By: [email or web address]   SELF           Confirmed By:     Occult  blood, Other [[id number]]     Order Status: Sent Lab Status: No result     Specimen: Body Fluid from Gastric          Medications:  Reconciled Home Medications:      Medication List      CONTINUE taking these medications    acetaminophen 500 MG tablet  Commonly known as: TYLENOL  Take 1,000 mg by mouth every 8 (eight) hours as needed for Pain.     aspirin 81 MG Chew  Take 81 mg by mouth once daily.     calcium carbonate 600 mg calcium (1,500 mg) Tab  Commonly known as: OS-ELIZA  Take 600 mg by mouth 2 (two) times daily with meals.     docusate sodium 100 MG capsule  Commonly known as: COLACE  Take 100 mg by mouth 2 (two) times daily.     finasteride 5 mg tablet  Commonly known as: PROSCAR  Take 5 mg by mouth once daily.     melatonin 3 mg Tbsr  Take 6 mg by mouth every evening.     meloxicam 15 MG tablet  Commonly known as: MOBIC  Take 15 mg by mouth once daily.     multivitamin with minerals tablet  Take 1 tablet by mouth once daily.     PROCARDIA XL 30 MG (OSM) 24 hr tablet  Generic drug: NIFEdipine  Take 30 mg by mouth once daily.            Indwelling Lines/Drains at time of discharge:   Lines/Drains/Airways     None                 Time spent on the discharge of patient: >30 minutes         Rehmat MICHAEL De Leon MD  Department of Hospital Medicine  [facility] - Emergency Department

## 2023-02-05 NOTE — SUBJECTIVE & OBJECTIVE
Past Medical History:   Diagnosis Date    Alzheimer's disease, unspecified (CODE)     Hypertension        History reviewed. No pertinent surgical history.    Review of patient's allergies indicates:  No Known Allergies  Family History    None       Tobacco Use    Smoking status: Unknown    Smokeless tobacco: Not on file   Substance and Sexual Activity    Alcohol use: Not on file    Drug use: Not on file    Sexual activity: Not on file     Review of Systems   Unable to perform ROS: Dementia   Objective:     Vital Signs (Most Recent):  Temp: 98.1 °F (36.7 °C) (02/04/23 0700)  Pulse: (!) 52 (02/05/23 0630)  Resp: 16 (02/05/23 0630)  BP: 118/72 (02/05/23 0630)  SpO2: 99 % (02/05/23 0130)   Vital Signs (24h Range):  Pulse:  [50-64] 52  Resp:  [10-16] 16  SpO2:  [92 %-100 %] 99 %  BP: (118-163)/(68-95) 118/72        There is no height or weight on file to calculate BMI.      Intake/Output Summary (Last 24 hours) at 2/5/2023 1156  Last data filed at 2/5/2023 0218  Gross per 24 hour   Intake 50 ml   Output --   Net 50 ml       Lines/Drains/Airways       None                   Physical Exam  Constitutional:       General: He is not in acute distress.     Comments: Nonverbal with upper/lower flexion contractures   HENT:      Head: Normocephalic and atraumatic.   Eyes:      General: No scleral icterus.  Cardiovascular:      Rate and Rhythm: Normal rate and regular rhythm.      Pulses: Normal pulses.      Heart sounds: Normal heart sounds.   Pulmonary:      Effort: Pulmonary effort is normal. No respiratory distress.      Breath sounds: Normal breath sounds.   Abdominal:      General: Bowel sounds are normal. There is no distension.      Palpations: Abdomen is soft.      Tenderness: There is no abdominal tenderness.   Skin:     General: Skin is warm and dry.      Coloration: Skin is not jaundiced.   Neurological:      Mental Status: He is alert. He is disoriented.      Cranial Nerves: No cranial nerve deficit.      Motor:  Weakness present.   Psychiatric:         Mood and Affect: Mood normal.       Significant Labs:  CBC:   Recent Labs   Lab 02/04/23  0737 02/04/23  1255 02/04/23  2212 02/05/23  0455 02/05/23  1039   WBC 6.48  --   --  5.13  --    HGB 8.9*   < > 9.3* 9.0* 9.8*   HCT 28.0*   < > 28.3* 28.5* 29.9*     --   --  296  --     < > = values in this interval not displayed.     BMP:   Recent Labs   Lab 02/05/23  0455   GLU 92      K 4.7      CO2 26   BUN 34*   CREATININE 1.33*   CALCIUM 9.0   MG 2.5*     CMP:   Recent Labs   Lab 02/05/23  0455   GLU 92   CALCIUM 9.0   ALBUMIN 2.8*   PROT 7.1      K 4.7   CO2 26      BUN 34*   CREATININE 1.33*   ALKPHOS 71   ALT 17   AST 23   BILITOT 0.4     Coagulation:   Recent Labs   Lab 02/04/23  1201   INR 1.14   APTT 31.0       Significant Imaging:  Imaging results within the past 24 hours have been reviewed.

## 2023-02-05 NOTE — CONSULTS
[facility] - Emergency Department  Gastroenterology  Consult Note    Patient Name: Jt Corrigan  MRN: [id number]  Admission Date: 2/4/2023  Hospital Length of Stay: 0 days  Code Status: Full Code   Attending Provider: Joslyn De Leon MD   Consulting Provider: KAYKAY Reynoso MD  Primary Care Physician: Mónica Cuenca MD  Principal Problem:GI bleed    Consults  Subjective:     HPI:  88 yo male NH patient with severe dementia referred to ED after report of some coffee ground material noted on his pillow. He has been in ER overnight with no bleeding seen and stool negative for occult blood. Hg unchanged at 9.0 since arrival but was 16 4 mos ago.He is unable to provide any Hx. Had swallowing eval this AM by speech which he passed per nurses.He is unable to provide any hx. Unclear if he's had previous endoscopy.      Past Medical History:   Diagnosis Date    Alzheimer's disease, unspecified (CODE)     Hypertension        History reviewed. No pertinent surgical history.    Review of patient's allergies indicates:  No Known Allergies  Family History    None       Tobacco Use    Smoking status: Unknown    Smokeless tobacco: Not on file   Substance and Sexual Activity    Alcohol use: Not on file    Drug use: Not on file    Sexual activity: Not on file     Review of Systems   Unable to perform ROS: Dementia   Objective:     Vital Signs (Most Recent):  Temp: 98.1 °F (36.7 °C) (02/04/23 0700)  Pulse: (!) 52 (02/05/23 0630)  Resp: 16 (02/05/23 0630)  BP: 118/72 (02/05/23 0630)  SpO2: 99 % (02/05/23 0130)   Vital Signs (24h Range):  Pulse:  [50-64] 52  Resp:  [10-16] 16  SpO2:  [92 %-100 %] 99 %  BP: (118-163)/(68-95) 118/72        There is no height or weight on file to calculate BMI.      Intake/Output Summary (Last 24 hours) at 2/5/2023 1156  Last data filed at 2/5/2023 0218  Gross per 24 hour   Intake 50 ml   Output --   Net 50 ml       Lines/Drains/Airways       None                   Physical  Exam  Constitutional:       General: He is not in acute distress.     Comments: Nonverbal with upper/lower flexion contractures   HENT:      Head: Normocephalic and atraumatic.   Eyes:      General: No scleral icterus.  Cardiovascular:      Rate and Rhythm: Normal rate and regular rhythm.      Pulses: Normal pulses.      Heart sounds: Normal heart sounds.   Pulmonary:      Effort: Pulmonary effort is normal. No respiratory distress.      Breath sounds: Normal breath sounds.   Abdominal:      General: Bowel sounds are normal. There is no distension.      Palpations: Abdomen is soft.      Tenderness: There is no abdominal tenderness.   Skin:     General: Skin is warm and dry.      Coloration: Skin is not jaundiced.   Neurological:      Mental Status: He is alert. He is disoriented.      Cranial Nerves: No cranial nerve deficit.      Motor: Weakness present.   Psychiatric:         Mood and Affect: Mood normal.       Significant Labs:  CBC:   Recent Labs   Lab 02/04/23  0737 02/04/23  1255 02/04/23  2212 02/05/23  0455 02/05/23  1039   WBC 6.48  --   --  5.13  --    HGB 8.9*   < > 9.3* 9.0* 9.8*   HCT 28.0*   < > 28.3* 28.5* 29.9*     --   --  296  --     < > = values in this interval not displayed.     BMP:   Recent Labs   Lab 02/05/23  0455   GLU 92      K 4.7      CO2 26   BUN 34*   CREATININE 1.33*   CALCIUM 9.0   MG 2.5*     CMP:   Recent Labs   Lab 02/05/23  0455   GLU 92   CALCIUM 9.0   ALBUMIN 2.8*   PROT 7.1      K 4.7   CO2 26      BUN 34*   CREATININE 1.33*   ALKPHOS 71   ALT 17   AST 23   BILITOT 0.4     Coagulation:   Recent Labs   Lab 02/04/23  1201   INR 1.14   APTT 31.0       Significant Imaging:  Imaging results within the past 24 hours have been reviewed.    Assessment/Plan:     * GI bleed  Pt with advanced dementia presents with report of coffee ground material in bed. No sign of active GI bleed since arrival yesterday with several brown BM's witnessed and stool negative  for OCB. His Hgb is down significantly from recent baseline. Recc clear liquid diet today.EGD in AM if remains stable without visible sign of further bleeding.        Thank you for your consult. Will schedule EGD with Dr. York for tomorrow. I'm available overnight if sign of GI bleeding noted.    KAYKAY Reynoso MD  Gastroenterology  [facility] - Emergency Department

## 2023-02-05 NOTE — ASSESSMENT & PLAN NOTE
Pt with advanced dementia presents with report of coffee ground material in bed. No sign of active GI bleed since arrival yesterday with several brown BM's witnessed and stool negative for OCB. His Hgb is down significantly from recent baseline. Recc clear liquid diet today.EGD in AM if remains stable without visible sign of further bleeding.

## 2023-02-05 NOTE — PLAN OF CARE
[facility] - Emergency Department  Initial Discharge Assessment       Primary Care Provider: Mónica Cuenca MD    Admission Diagnosis: Weakness [R53.1]    Admission Date: 2/4/2023  Expected Discharge Date: 2/5/2023    Discharge Barriers Identified: (P) None    Payor: MEDICARE / Plan: MEDICARE PART A & B / Product Type: Government /     No emergency contact information on file.    Discharge Plan A: (P) Return to nursing home  Discharge Plan B: (P) Return to Nursing Home    No Pharmacies Listed    Initial Assessment (most recent)       Adult Discharge Assessment - 02/05/23 1115          Discharge Assessment    Assessment Type Discharge Planning Assessment (P)      Confirmed/corrected address, phone number and insurance Yes (P)      Confirmed Demographics Correct on Facesheet (P)      Source of Information family (P)      If unable to respond/provide information was family/caregiver contacted? Yes (P)      Contact Name/Number Cary Kenny (Daughter) [phone] (P)      Does patient/caregiver understand observation status Yes (P)      Communicated CANDIDA with patient/caregiver Date not available/Unable to determine (P)      People in Home facility resident (P)      Facility Arrived From: [facility] (P)      Do you expect to return to your current living situation? Yes (P)      Do you have help at home or someone to help you manage your care at home? -- (P)    Pt is a nursing home resident    Prior to hospitilization cognitive status: Unable to Assess (P)      Current cognitive status: Unable to Assess (P)      Walking or Climbing Stairs -- (P)    Per daughter, pt is bed bound and unable to walk    Dressing/Bathing -- (P)    Per daughter, pt requires total care    Equipment Currently Used at Home lift device;hospital bed (P)      Readmission within 30 days? No (P)      Patient currently being followed by outpatient case management? No (P)      Do you currently have service(s)  that help you manage your care at home? No (P)      Do you take prescription medications? Yes (P)      Do you have prescription coverage? Yes (P)      Coverage Medicare (P)      Do you have any problems affording any of your prescribed medications? No (P)      Is the patient taking medications as prescribed? yes (P)      Who is going to help you get home at discharge? Pt will require transport back to NH via ambulance (P)      How do you get to doctors appointments? -- (P)    Pt is a nursing home resident    Are you on dialysis? No (P)      Do you take coumadin? No (P)      Discharge Plan A Return to nursing home (P)      Discharge Plan B Return to Nursing Home (P)      DME Needed Upon Discharge  none (P)      Discharge Plan discussed with: Adult children (P)      Discharge Barriers Identified None (P)         Physical Activity    On average, how many days per week do you engage in moderate to strenuous exercise (like a brisk walk)? 0 days (P)      On average, how many minutes do you engage in exercise at this level? 0 min (P)         Financial Resource Strain    How hard is it for you to pay for the very basics like food, housing, medical care, and heating? Not hard at all (P)         Housing Stability    In the last 12 months, was there a time when you were not able to pay the mortgage or rent on time? No (P)      In the last 12 months, how many places have you lived? 1 (P)      In the last 12 months, was there a time when you did not have a steady place to sleep or slept in a shelter (including now)? No (P)         Transportation Needs    In the past 12 months, has lack of transportation kept you from medical appointments or from getting medications? No (P)      In the past 12 months, has lack of transportation kept you from meetings, work, or from getting things needed for daily living? No (P)         Food Insecurity    Within the past 12 months, you worried that your food would run out before you got the money  to buy more. Never true (P)      Within the past 12 months, the food you bought just didn't last and you didn't have money to get more. Never true (P)         Stress    Do you feel stress - tense, restless, nervous, or anxious, or unable to sleep at night because your mind is troubled all the time - these days? Not at all (P)         Social Connections    In a typical week, how many times do you talk on the phone with family, friends, or neighbors? Never (P)      How often do you get together with friends or relatives? -- (P)    Daughter works at NH so she visits pt often    How often do you attend Gnosticism or Mandaen services? Never (P)      Do you belong to any clubs or organizations such as Gnosticism groups, unions, fraternal or athletic groups, or school groups? No (P)      How often do you attend meetings of the clubs or organizations you belong to? Never (P)         Alcohol Use    Q1: How often do you have a drink containing alcohol? Never (P)      Q2: How many drinks containing alcohol do you have on a typical day when you are drinking? Patient does not drink (P)      Q3: How often do you have six or more drinks on one occasion? Never (P)                  SW received consult for M. Spoke with pt's daughter, Cary Kenny @ [phone] and obtained [facility].  Ramesh informed. Pt is a resident at [facility] and per daughter ,who is also the  at [facility], pt is able to return to NH when medically stable.  Daughter also reports pt uses a lift as he is bed bound and unable to walk. IM obtained.  Packet started.  YUE following for discharge needs.

## 2023-02-05 NOTE — NURSING
Received patient from ER. Patient non verbal. 20g iv to R arm. C/D/I. Patient has contractures in all extremities. Patient had small BM during transfer. Clean, dried, and changed. Pt repositioned to left side. V/S stable. Head to toe assessment complete. Four eyes skin assessment completed with NANCY Bolivar RN. Safety measures in place.

## 2023-02-05 NOTE — PT/OT/SLP EVAL
Speech Language Pathology Evaluation  Bedside Swallow    Patient Name:  Jt Corrigan   MRN:  [id number]  Admitting Diagnosis: UGIB (upper gastrointestinal bleed)    Recommendations:                 General Recommendations:  Follow-up not indicated  Diet recommendations:   ,   Puree with thin liquids  Aspiration Precautions: Feed only when awake/alert, HOB to 90 degrees, and Standard aspiration precautions   General Precautions: Standard,    Communication strategies:  none    History:     Past Medical History:   Diagnosis Date    Alzheimer's disease, unspecified (CODE)     Hypertension        History reviewed. No pertinent surgical history.    Social History: Patient lives at NH.    Prior Intubation HX:  N/a    Modified Barium Swallow: N/a    Chest X-Rays: See chart    Prior diet: Puree.    Occupation/hobbies/homemaking: None stated.    Subjective     Patient lying in bed. Patient did not verbalize or follow directions, but smiled upon therapist arrival.  Patient goals: None stated     Pain/Comfort:       Respiratory Status: Room air    Objective:     Oral Musculature Evaluation   Oral Musculature: General weakness  Dentition: Scattered  Secretion Management: Adequate  Mucosal Quality: Adequate  Oral Labial Strength and Mobility: WFL  Lingual Strength and Mobility: WFL      Bedside Swallow Eval:   Consistencies Assessed:  Thin liquids Patient tolerated trials of water via spoon and straw without overt s/s aspiration noted. Patient able to take consecutive sips from straw without difficulty.  Puree Patient tolerated trials of pudding via spoon without overt s/s aspiration noted.      Oral Phase:   WFL    Pharyngeal Phase:   no overt clinical signs/symptoms of aspiration    Compensatory Strategies  None    Treatment: No treatment warranted.    Assessment:     Jt Corrigan is a 89 y.o. male with an SLP diagnosis of Dysphagia.  He presents with no overt s/s aspiration at this time. Patient may not intake enough due to dementia  to provide appropriate nutrition, however he tolerates puree texture without s/s aspiration or difficulty.    Goals:   Multidisciplinary Problems       SLP Goals       Not on file                    Plan:     Patient to be seen:      Plan of Care expires:     Plan of Care reviewed with:      SLP Follow-Up:          Discharge recommendations:    Puree textured diet; feed only when awake alert  Barriers to Discharge:  Level of Skilled Assistance Needed   and Safety Awareness      Time Tracking:     SLP Treatment Date:      Speech Start Time:   0939  Speech Stop Time:      0954  Speech Total Time (min):   15    Billable Minutes: Eval Swallow and Oral Function 15    02/05/2023

## 2023-02-05 NOTE — HOSPITAL COURSE
For a more detailed hospital course see IP notes briefly, pt was a/w uti and suspected UGIB, gi to see pt. D/t no bed availability at [facility], pt will be transferred to [facility] for further care ie tx of UGIB and uti.

## 2023-02-05 NOTE — HPI
88 yo male NH patient with severe dementia referred to ED after report of some coffee ground material noted on his pillow. He has been in ER overnight with no bleeding seen and stool negative for occult blood. Hg unchanged at 9.0 since arrival but was 16 4 mos ago.He is unable to provide any Hx. Had swallowing eval this AM by speech which he passed per nurses.He is unable to provide any hx. Unclear if he's had previous endoscopy.

## 2023-02-06 ENCOUNTER — ANESTHESIA (OUTPATIENT)
Dept: GASTROENTEROLOGY | Facility: HOSPITAL | Age: 88
End: 2023-02-06
Payer: MEDICARE

## 2023-02-06 ENCOUNTER — HOSPITAL ENCOUNTER (OUTPATIENT)
Dept: GASTROENTEROLOGY | Facility: HOSPITAL | Age: 88
Discharge: HOME | End: 2023-02-06
Attending: INTERNAL MEDICINE
Payer: MEDICARE

## 2023-02-06 ENCOUNTER — ANESTHESIA EVENT (OUTPATIENT)
Dept: GASTROENTEROLOGY | Facility: HOSPITAL | Age: 88
End: 2023-02-06
Payer: MEDICARE

## 2023-02-06 VITALS
SYSTOLIC BLOOD PRESSURE: 97 MMHG | RESPIRATION RATE: 13 BRPM | OXYGEN SATURATION: 100 % | HEART RATE: 67 BPM | DIASTOLIC BLOOD PRESSURE: 49 MMHG | TEMPERATURE: 98 F

## 2023-02-06 DIAGNOSIS — K29.00 ACUTE SUPERFICIAL GASTRITIS WITHOUT HEMORRHAGE: ICD-10-CM

## 2023-02-06 DIAGNOSIS — K92.0 HEMATEMESIS, UNSPECIFIED WHETHER NAUSEA PRESENT: Primary | ICD-10-CM

## 2023-02-06 DIAGNOSIS — K20.90 ESOPHAGITIS: ICD-10-CM

## 2023-02-06 DIAGNOSIS — K25.3 ACUTE GASTRIC ULCER WITHOUT HEMORRHAGE OR PERFORATION: ICD-10-CM

## 2023-02-06 LAB
ALBUMIN SERPL BCP-MCNC: 2.9 G/DL (ref 3.5–5)
ALBUMIN/GLOB SERPL: 0.7 {RATIO}
ALP SERPL-CCNC: 73 U/L (ref 45–115)
ALT SERPL W P-5'-P-CCNC: 17 U/L (ref 16–61)
ANION GAP SERPL CALCULATED.3IONS-SCNC: 15 MMOL/L (ref 7–16)
AST SERPL W P-5'-P-CCNC: 21 U/L (ref 15–37)
BILIRUB SERPL-MCNC: 0.3 MG/DL (ref ?–1.2)
BUN SERPL-MCNC: 27 MG/DL (ref 7–18)
BUN/CREAT SERPL: 21 (ref 6–20)
CALCIUM SERPL-MCNC: 8.6 MG/DL (ref 8.5–10.1)
CHLORIDE SERPL-SCNC: 102 MMOL/L (ref 98–107)
CO2 SERPL-SCNC: 24 MMOL/L (ref 21–32)
CREAT SERPL-MCNC: 1.29 MG/DL (ref 0.7–1.3)
EGFR (NO RACE VARIABLE) (RUSH/TITUS): 53 ML/MIN/1.73M²
GLOBULIN SER-MCNC: 4.3 G/DL (ref 2–4)
GLUCOSE SERPL-MCNC: 199 MG/DL (ref 74–106)
MAGNESIUM SERPL-MCNC: 2.3 MG/DL (ref 1.7–2.3)
PHOSPHATE SERPL-MCNC: 3.2 MG/DL (ref 2.5–4.5)
POTASSIUM SERPL-SCNC: 4.3 MMOL/L (ref 3.5–5.1)
PROT SERPL-MCNC: 7.2 G/DL (ref 6.4–8.2)
SODIUM SERPL-SCNC: 137 MMOL/L (ref 136–145)
UA COMPLETE W REFLEX CULTURE PNL UR: ABNORMAL

## 2023-02-06 PROCEDURE — D9220A PRA ANESTHESIA: Mod: ,,, | Performed by: NURSE ANESTHETIST, CERTIFIED REGISTERED

## 2023-02-06 PROCEDURE — 25000003 PHARM REV CODE 250

## 2023-02-06 PROCEDURE — 63600175 PHARM REV CODE 636 W HCPCS: Performed by: NURSE ANESTHETIST, CERTIFIED REGISTERED

## 2023-02-06 PROCEDURE — 37000008 HC ANESTHESIA 1ST 15 MINUTES

## 2023-02-06 PROCEDURE — 88342 SURGICAL PATHOLOGY: ICD-10-PCS | Mod: 26,,, | Performed by: PATHOLOGY

## 2023-02-06 PROCEDURE — D9220A PRA ANESTHESIA: ICD-10-PCS | Mod: ,,, | Performed by: NURSE ANESTHETIST, CERTIFIED REGISTERED

## 2023-02-06 PROCEDURE — C9113 INJ PANTOPRAZOLE SODIUM, VIA: HCPCS | Performed by: INTERNAL MEDICINE

## 2023-02-06 PROCEDURE — 88305 TISSUE EXAM BY PATHOLOGIST: CPT

## 2023-02-06 PROCEDURE — 27201423 OPTIME MED/SURG SUP & DEVICES STERILE SUPPLY

## 2023-02-06 PROCEDURE — 37000009 HC ANESTHESIA EA ADD 15 MINS

## 2023-02-06 PROCEDURE — 43239 EGD BIOPSY SINGLE/MULTIPLE: CPT | Mod: ,,, | Performed by: INTERNAL MEDICINE

## 2023-02-06 PROCEDURE — 80053 COMPREHEN METABOLIC PANEL: CPT | Performed by: INTERNAL MEDICINE

## 2023-02-06 PROCEDURE — 11000001 HC ACUTE MED/SURG PRIVATE ROOM

## 2023-02-06 PROCEDURE — 25000003 PHARM REV CODE 250: Performed by: INTERNAL MEDICINE

## 2023-02-06 PROCEDURE — 43239 EGD BIOPSY SINGLE/MULTIPLE: CPT

## 2023-02-06 PROCEDURE — 99223 PR INITIAL HOSPITAL CARE,LEVL III: ICD-10-PCS | Mod: AI,,, | Performed by: INTERNAL MEDICINE

## 2023-02-06 PROCEDURE — 88305 TISSUE EXAM BY PATHOLOGIST: CPT | Mod: 26,,, | Performed by: PATHOLOGY

## 2023-02-06 PROCEDURE — 83735 ASSAY OF MAGNESIUM: CPT | Performed by: INTERNAL MEDICINE

## 2023-02-06 PROCEDURE — 88342 IMHCHEM/IMCYTCHM 1ST ANTB: CPT | Mod: 26,,, | Performed by: PATHOLOGY

## 2023-02-06 PROCEDURE — 43239 PR EGD, FLEX, W/BIOPSY, SGL/MULTI: ICD-10-PCS | Mod: ,,, | Performed by: INTERNAL MEDICINE

## 2023-02-06 PROCEDURE — 88305 SURGICAL PATHOLOGY: ICD-10-PCS | Mod: 26,,, | Performed by: PATHOLOGY

## 2023-02-06 PROCEDURE — 63600175 PHARM REV CODE 636 W HCPCS: Performed by: INTERNAL MEDICINE

## 2023-02-06 PROCEDURE — 43239 EGD BIOPSY SINGLE/MULTIPLE: CPT | Performed by: INTERNAL MEDICINE

## 2023-02-06 PROCEDURE — 99223 1ST HOSP IP/OBS HIGH 75: CPT | Mod: AI,,, | Performed by: INTERNAL MEDICINE

## 2023-02-06 PROCEDURE — 84100 ASSAY OF PHOSPHORUS: CPT | Performed by: INTERNAL MEDICINE

## 2023-02-06 PROCEDURE — 25000003 PHARM REV CODE 250: Performed by: NURSE ANESTHETIST, CERTIFIED REGISTERED

## 2023-02-06 PROCEDURE — 63600175 PHARM REV CODE 636 W HCPCS

## 2023-02-06 PROCEDURE — 88305 TISSUE EXAM BY PATHOLOGIST: CPT | Mod: TC,SUR | Performed by: INTERNAL MEDICINE

## 2023-02-06 PROCEDURE — 88342 IMHCHEM/IMCYTCHM 1ST ANTB: CPT

## 2023-02-06 RX ORDER — SODIUM CHLORIDE 0.9 % (FLUSH) 0.9 %
10 SYRINGE (ML) INJECTION
Status: CANCELLED | OUTPATIENT
Start: 2023-02-06

## 2023-02-06 RX ORDER — PROPOFOL 10 MG/ML
VIAL (ML) INTRAVENOUS
Status: DISCONTINUED | OUTPATIENT
Start: 2023-02-06 | End: 2023-02-06

## 2023-02-06 RX ORDER — LIDOCAINE HYDROCHLORIDE 20 MG/ML
INJECTION, SOLUTION EPIDURAL; INFILTRATION; INTRACAUDAL; PERINEURAL
Status: DISCONTINUED | OUTPATIENT
Start: 2023-02-06 | End: 2023-02-06

## 2023-02-06 RX ADMIN — SODIUM CHLORIDE: 9 INJECTION, SOLUTION INTRAVENOUS at 02:02

## 2023-02-06 RX ADMIN — LIDOCAINE HYDROCHLORIDE 60 MG: 20 INJECTION, SOLUTION INTRAVENOUS at 03:02

## 2023-02-06 RX ADMIN — PANTOPRAZOLE SODIUM 40 MG: 40 INJECTION, POWDER, LYOPHILIZED, FOR SOLUTION INTRAVENOUS at 09:02

## 2023-02-06 RX ADMIN — CEFTRIAXONE SODIUM 1 G: 1 INJECTION, POWDER, FOR SOLUTION INTRAMUSCULAR; INTRAVENOUS at 09:02

## 2023-02-06 RX ADMIN — PROPOFOL 100 MG: 10 INJECTION, EMULSION INTRAVENOUS at 03:02

## 2023-02-06 NOTE — H&P
[facility] Medicine  History & Physical    Patient Name: Jt Corrigan  MRN: [id number]  Patient Class: IP- Inpatient  Admission Date: 2/5/2023  Attending Physician: Joslyn De Leon MD   Primary Care Provider: Mónica Cuenca MD         Patient information was obtained from patient and ER records.     Subjective:     Principal Problem:UGIB (upper gastrointestinal bleed)    Chief Complaint: No chief complaint on file.       HPI: Patient is 89-year-old male with past medical history of hypertension, severe dementia resident at a nursing home, who presented to the emergency room from nursing home for coffee-ground emesis.  Patient is nonverbal, he can not give any history due to his advanced dementia, his daughter provided most of the history at [facility]. Pt was also found to have group b uti for which he was started on rocephen. Pt was seen by GI, and d/t significant change in h/h with his BL, plan is to get EGD. In the meanwhile pt will continue with his iv abx for uti. Pt will be transferred to [facility] d/t no bed availibility at [facility].        Past Medical History:   Diagnosis Date    Alzheimer's disease, unspecified (CODE)     Hypertension        History reviewed. No pertinent surgical history.    Review of patient's allergies indicates:  No Known Allergies    Current Facility-Administered Medications on File Prior to Encounter   Medication    [DISCONTINUED] acetaminophen tablet 650 mg    [DISCONTINUED] acetaminophen tablet 650 mg    [DISCONTINUED] aluminum-magnesium hydroxide-simethicone 200-200-20 mg/5 mL suspension 30 mL    [DISCONTINUED] bisacodyL suppository 10 mg    [DISCONTINUED] cefTRIAXone (ROCEPHIN) 1 g in dextrose 5 % in water (D5W) 5 % 50 mL IVPB (MB+)    [DISCONTINUED] dextrose 10% bolus 125 mL 125 mL    [DISCONTINUED] dextrose 10% bolus 250 mL 250 mL    [DISCONTINUED] finasteride tablet 5 mg    [DISCONTINUED] glucagon (human recombinant) injection 1 mg     [DISCONTINUED] hydrALAZINE tablet 25 mg    [DISCONTINUED] melatonin tablet 3 mg    [DISCONTINUED] melatonin tablet 6 mg    [DISCONTINUED] multivitamin tablet    [DISCONTINUED] naloxone 0.4 mg/mL injection 0.02 mg    [DISCONTINUED] NIFEdipine 24 hr tablet 30 mg    [DISCONTINUED] ondansetron injection 4 mg    [DISCONTINUED] pantoprazole injection 40 mg    [DISCONTINUED] polyethylene glycol packet 17 g    [DISCONTINUED] senna-docusate 8.6-50 mg per tablet 1 tablet    [DISCONTINUED] simethicone chewable tablet 80 mg     Current Outpatient Medications on File Prior to Encounter   Medication Sig    acetaminophen (TYLENOL) 500 MG tablet Take 1,000 mg by mouth every 8 (eight) hours as needed for Pain.    aspirin 81 MG Chew Take 81 mg by mouth once daily.    calcium carbonate (OS-ELIZA) 600 mg calcium (1,500 mg) Tab Take 600 mg by mouth 2 (two) times daily with meals.    docusate sodium (COLACE) 100 MG capsule Take 100 mg by mouth 2 (two) times daily.    finasteride (PROSCAR) 5 mg tablet Take 5 mg by mouth once daily.    melatonin 3 mg TbSR Take 6 mg by mouth every evening.    meloxicam (MOBIC) 15 MG tablet Take 15 mg by mouth once daily.    multivitamin with minerals tablet Take 1 tablet by mouth once daily.    NIFEdipine (PROCARDIA XL) 30 MG (OSM) 24 hr tablet Take 30 mg by mouth once daily.     Family History    None       Tobacco Use    Smoking status: Unknown    Smokeless tobacco: Not on file   Substance and Sexual Activity    Alcohol use: Not on file    Drug use: Not on file    Sexual activity: Not on file     Review of Systems   Unable to perform ROS: Dementia   Objective:     Vital Signs (Most Recent):  Temp: 98.2 °F (36.8 °C) (02/06/23 0347)  Pulse: 67 (02/06/23 0347)  Resp: 16 (02/06/23 0347)  BP: (!) 106/49 (02/06/23 0347)  SpO2: 97 % (02/06/23 0347)   Vital Signs (24h Range):  Temp:  [97.9 °F (36.6 °C)-99.1 °F (37.3 °C)] 98.2 °F (36.8 °C)  Pulse:  [55-67] 67  Resp:  [16-18] 16  SpO2:   [97 %-99 %] 97 %  BP: ()/(38-69) 106/49     Weight: 69.8 kg (153 lb 14.1 oz)  Body mass index is 23.4 kg/m².    Physical Exam  Constitutional:       General: He is not in acute distress.     Appearance: He is not toxic-appearing.   HENT:      Head: Normocephalic.      Mouth/Throat:      Mouth: Mucous membranes are dry.   Cardiovascular:      Rate and Rhythm: Normal rate.   Pulmonary:      Effort: Pulmonary effort is normal. No respiratory distress.   Abdominal:      General: Bowel sounds are normal.      Palpations: Abdomen is soft.   Musculoskeletal:      Right lower leg: No edema.      Left lower leg: No edema.      Comments: L leg bigger than right.    Skin:     General: Skin is dry.   Neurological:      Mental Status: Mental status is at baseline.           Significant Labs: All pertinent labs within the past 24 hours have been reviewed.    Significant Imaging: I have reviewed all pertinent imaging results/findings within the past 24 hours.    Assessment/Plan:     * UGIB (upper gastrointestinal bleed)    GI consulted   Appreciate recs  Cont iv ppi    CLOTILDE (acute kidney injury)  Patient with acute kidney injury likely due to IVVD/dehydration CLOTILDE is currently improving. Labs reviewed- Renal function/electrolytes with Estimated Creatinine Clearance: 36.4 mL/min (A) (based on SCr of 1.33 mg/dL (H)). according to latest data. Monitor urine output and serial BMP and adjust therapy as needed. Avoid nephrotoxins and renally dose meds for GFR listed above.       HTN (hypertension)    Cont procardia      Contracture of joint    Present from admission      UTI (urinary tract infection)    rocephen  Group b strep       Dementia    Cont present mx        VTE Risk Mitigation (From admission, onward)         Ordered     IP VTE HIGH RISK PATIENT  Once         02/05/23 1122     Place sequential compression device  Until discontinued         02/05/23 1122                   Rehmat MICHAEL De Leon MD  Department of Hospital Medicine    [facility]

## 2023-02-06 NOTE — TRANSFER OF CARE
Anesthesia Transfer of Care Note    Patient: Jt Corrigan    Procedure(s) Performed: *EGD with biopsy *    Patient location: GI    Anesthesia Type: general    Transport from OR: Transported from OR on room air with adequate spontaneous ventilation. Continuous ECG monitoring in transport. Continuous SpO2 monitoring in transport    Post pain: adequate analgesia    Post assessment: no apparent anesthetic complications    Post vital signs: stable    Level of consciousness: sedated and responds to stimulation    Nausea/Vomiting: no nausea/vomiting    Complications: none    Transfer of care protocol was followedComments: Good SV continue, NAD, VSS, RTRN      Last vitals:   Visit Vitals  BP (!) 82/36   Pulse 64   Temp 36.7 °C (98 °F)   Resp 16   SpO2 100%

## 2023-02-06 NOTE — ANESTHESIA PREPROCEDURE EVALUATION
02/06/2023  Jt Corrigan is a 89 y.o., male.  Past Medical History:   Diagnosis Date    Alzheimer's disease, unspecified (CODE)     Hypertension        History reviewed. No pertinent surgical history.    History reviewed. No pertinent family history.    Social History     Socioeconomic History    Marital status: Unknown   Tobacco Use    Smoking status: Unknown     Social Determinants of Health     Financial Resource Strain: Low Risk     Difficulty of Paying Living Expenses: Not hard at all   Food Insecurity: No Food Insecurity    Worried About Running Out of Food in the Last Year: Never true    Ran Out of Food in the Last Year: Never true   Transportation Needs: No Transportation Needs    Lack of Transportation (Medical): No    Lack of Transportation (Non-Medical): No   Physical Activity: Inactive    Days of Exercise per Week: 0 days    Minutes of Exercise per Session: 0 min   Stress: No Stress Concern Present    Feeling of Stress : Not at all   Social Connections: Unknown    Frequency of Communication with Friends and Family: Never    Attends Yarsanism Services: Never    Active Member of Clubs or Organizations: No    Attends Club or Organization Meetings: Never   Housing Stability: Low Risk     Unable to Pay for Housing in the Last Year: No    Number of Places Lived in the Last Year: 1    Unstable Housing in the Last Year: No       No current facility-administered medications for this encounter.     No current outpatient medications on file.     Facility-Administered Medications Ordered in Other Encounters   Medication Dose Route Frequency Provider Last Rate Last Admin    acetaminophen tablet 650 mg  650 mg Oral Q8H PRN Rehmat U MD Haley        acetaminophen tablet 650 mg  650 mg Oral Q4H PRN Rehmat U MD Haley        cefTRIAXone (ROCEPHIN) 1 g in dextrose 5 % in water (D5W) 5 % 50 mL IVPB  (MB+)  1 g Intravenous Q24H Rehmat U MD Haley   Stopped at 02/05/23 2150    docusate sodium capsule 100 mg  100 mg Oral BID Rehmat U MD Haley   100 mg at 02/05/23 2120    finasteride tablet 5 mg  5 mg Oral Daily Rehmat MICHAEL De Leon MD   5 mg at 02/05/23 1505    melatonin tablet 3 mg  3 mg Oral Nightly Rehmat U MD Haley   3 mg at 02/05/23 2120    multivitamin tablet  1 tablet Oral Daily Rehmat U MD Haley   1 tablet at 02/05/23 1506    NIFEdipine 24 hr tablet 30 mg  30 mg Oral Daily Rehmat U MD Haley   30 mg at 02/05/23 1506    ondansetron disintegrating tablet 8 mg  8 mg Oral Q8H PRN Rehmat U MD Haley        pantoprazole injection 40 mg  40 mg Intravenous BID Rehmat U MD Haley   40 mg at 02/05/23 2120       Review of patient's allergies indicates:  No Known Allergies      Pre-op Assessment    I have reviewed the Patient Summary Reports.     I have reviewed the Nursing Notes. I have reviewed the NPO Status.   I have reviewed the Medications.     Review of Systems  Anesthesia Hx:  No problems with previous Anesthesia    Cardiovascular:   Hypertension    Renal/:   Chronic Renal Disease    Psych:   Psychiatric History (dementia)          Physical Exam  General: Well nourished, Alert, Oriented and Cooperative    Airway:  Mallampati: II   Mouth Opening: Normal  Neck ROM: Normal ROM    Dental:  Intact    Chest/Lungs:  Normal Respiratory Rate    Heart:  Rate: Normal        Anesthesia Plan  Type of Anesthesia, risks & benefits discussed:    Anesthesia Type: Gen Natural Airway, MAC  Intra-op Monitoring Plan: Standard ASA Monitors  Post Op Pain Control Plan: multimodal analgesia and IV/PO Opioids PRN  Induction:  IV  Informed Consent: Informed consent signed with the Patient and all parties understand the risks and agree with anesthesia plan.  All questions answered. Patient consented to blood products? Yes  ASA Score: 3  Day of Surgery Review of History & Physical: I have interviewed and examined the patient. I have  reviewed the patient's H&P dated: There are no significant changes.     Ready For Surgery From Anesthesia Perspective.     .

## 2023-02-06 NOTE — PLAN OF CARE
Problem: Fall Injury Risk  Goal: Absence of Fall and Fall-Related Injury  2/6/2023 0254 by Dalia Martin RN  Outcome: Ongoing, Progressing  2/6/2023 0137 by Dalia Martin RN  Outcome: Ongoing, Progressing     Problem: Fluid and Electrolyte Imbalance (Acute Kidney Injury/Impairment)  Goal: Fluid and Electrolyte Balance  2/6/2023 0254 by Dalia Martin RN  Outcome: Ongoing, Progressing  2/6/2023 0137 by Dalia Martin RN  Outcome: Ongoing, Progressing     Problem: Oral Intake Inadequate (Acute Kidney Injury/Impairment)  Goal: Optimal Nutrition Intake  2/6/2023 0254 by Dalia Martin RN  Outcome: Ongoing, Progressing  2/6/2023 0137 by Dalia Martin RN  Outcome: Ongoing, Progressing     Problem: Renal Function Impairment (Acute Kidney Injury/Impairment)  Goal: Effective Renal Function  2/6/2023 0254 by Dalia Martin RN  Outcome: Ongoing, Progressing  2/6/2023 0137 by Dalia Martin RN  Outcome: Ongoing, Progressing     Problem: Skin Injury Risk Increased  Goal: Skin Health and Integrity  2/6/2023 0254 by Dalia Martin RN  Outcome: Ongoing, Progressing  2/6/2023 0137 by Dalia Martin RN  Outcome: Ongoing, Progressing     Problem: Adult Inpatient Plan of Care  Goal: Plan of Care Review  2/6/2023 0254 by Dalia Martin RN  Outcome: Ongoing, Progressing  2/6/2023 0137 by Dalia Martin RN  Outcome: Ongoing, Progressing  Goal: Patient-Specific Goal (Individualized)  2/6/2023 0254 by Dalia Martin RN  Outcome: Ongoing, Progressing  2/6/2023 0137 by Dalia Martin RN  Outcome: Ongoing, Progressing  Goal: Absence of Hospital-Acquired Illness or Injury  2/6/2023 0254 by Dalia Martin RN  Outcome: Ongoing, Progressing  2/6/2023 0137 by Dalia Martin RN  Outcome: Ongoing, Progressing  Goal: Optimal Comfort and Wellbeing  2/6/2023 0254 by Dalia Martin, RN  Outcome: Ongoing, Progressing  2/6/2023 0137 by Dalia Martin RN  Outcome: Ongoing, Progressing  Goal: Readiness for Transition   Care  2/6/2023 0254 by Dalia Martin RN  Outcome: Ongoing, Progressing  2/6/2023 0137 by Dalia Martin RN  Outcome: Ongoing, Progressing

## 2023-02-06 NOTE — PLAN OF CARE
Problem: Fall Injury Risk  Goal: Absence of Fall and Fall-Related Injury  Outcome: Ongoing, Progressing     Problem: Fluid and Electrolyte Imbalance (Acute Kidney Injury/Impairment)  Goal: Fluid and Electrolyte Balance  Outcome: Ongoing, Progressing     Problem: Oral Intake Inadequate (Acute Kidney Injury/Impairment)  Goal: Optimal Nutrition Intake  Outcome: Ongoing, Progressing     Problem: Renal Function Impairment (Acute Kidney Injury/Impairment)  Goal: Effective Renal Function  Outcome: Ongoing, Progressing     Problem: Skin Injury Risk Increased  Goal: Skin Health and Integrity  Outcome: Ongoing, Progressing     Problem: Adult Inpatient Plan of Care  Goal: Plan of Care Review  Outcome: Ongoing, Progressing  Goal: Patient-Specific Goal (Individualized)  Outcome: Ongoing, Progressing  Goal: Absence of Hospital-Acquired Illness or Injury  Outcome: Ongoing, Progressing  Goal: Optimal Comfort and Wellbeing  Outcome: Ongoing, Progressing  Goal: Readiness for Transition of Care  Outcome: Ongoing, Progressing

## 2023-02-06 NOTE — ANESTHESIA POSTPROCEDURE EVALUATION
Anesthesia Post Evaluation    Patient: Jt Corrigan    Procedure(s) Performed: * EGD with biopsy *    Final Anesthesia Type: general      Patient location during evaluation: GI PACU  Patient participation: Yes- Able to Participate  Level of consciousness: awake and alert  Post-procedure vital signs: reviewed and stable  Pain management: adequate  Airway patency: patent    PONV status at discharge: No PONV  Anesthetic complications: no      Cardiovascular status: blood pressure returned to baseline and hemodynamically stable  Respiratory status: spontaneous ventilation  Hydration status: euvolemic  Follow-up not needed.  Comments: Pt voices appreciation for care          Vitals Value Taken Time   BP 88/67 02/06/23 1540   Temp 36.7 °C (98 °F) 02/06/23 1512   Pulse 66 02/06/23 1540   Resp 16 02/06/23 1540   SpO2 100 % 02/06/23 1540   Vitals shown include unvalidated device data.      No case tracking events are documented in the log.      Pain/Sharad Score: Sharad Score: 8 (2/6/2023  3:16 PM)

## 2023-02-06 NOTE — HPI
Patient is 89-year-old male with past medical history of hypertension, severe dementia resident at a nursing home, who presented to the emergency room from nursing home for coffee-ground emesis.  Patient is nonverbal, he can not give any history due to his advanced dementia, his daughter provided most of the history at [facility]. Pt was also found to have group b uti for which he was started on rocephen. Pt was seen by GI, and d/t significant change in h/h with his BL, plan is to get EGD. In the meanwhile pt will continue with his iv abx for uti. Pt will be transferred to [facility] d/t no bed availibility at [facility].

## 2023-02-06 NOTE — DISCHARGE INSTRUCTIONS
Procedure Date  2/6/23     Impression  Overall Impression: The esophagus has inflammation and ulceration of the distal esophagus, biopsies were obtained. A 4cm hiatus hernia was noted. Gastritis  was present and a non-bleeding ulcer was noted in the pre-pyloric antrum, biopsies were obtained. Mucosa of the duodenum was normal.     Recommendation    Await pathology results      Avoid nsaids; ppi 1/day. Resume diet and iron supplement. Treat H.pylori if present on biopsy. Consider colonoscopy if iron deficiency persists.     THE NURSE WILL CALL YOU WITH YOUR BIOPSY RESULTS IN A FEW DAYS.     NO DRIVING, OPERATING EQUIPMENT, OR SIGNING LEGAL DOCUMENTS FOR 24 HOURS.     Procedure Date  2/6/23     Impression  Overall Impression: The esophagus has inflammation and ulceration of the distal esophagus, biopsies were obtained. A 4cm hiatus hernia was noted. Gastritis  was present and a non-bleeding ulcer was noted in the pre-pyloric antrum, biopsies were obtained. Mucosa of the duodenum was normal.     Recommendation    Await pathology results      Avoid nsaids; ppi 1/day. Resume diet and iron supplement. Treat H.pylori if present on biopsy. Consider colonoscopy if iron deficiency persists.    THE NURSE WILL CALL YOU WITH YOUR BIOPSY RESULTS IN A FEW DAYS.     NO DRIVING, OPERATING EQUIPMENT, OR SIGNING LEGAL DOCUMENTS FOR 24 HOURS.

## 2023-02-06 NOTE — NURSING
Wound care note: 89yo male admitted to [facility] with diagnosis of hematemesis. Skin assessment performed, no skin break down noted.

## 2023-02-06 NOTE — H&P
[facility] Endoscopy  Gastroenterology  H&P    Patient Name: Jt Corrigan  MRN: [id number]  Admission Date: 2/6/2023  Code Status: Full Code    Attending Provider: Rocky York MD   Primary Care Physician: Mónica Cuenca MD  Principal Problem:<principal problem not specified>    Subjective:     History of Present Illness: Pt has hematemesis and iron deficiency anemia.    Past Medical History:   Diagnosis Date    Alzheimer's disease, unspecified (CODE)     Hypertension        History reviewed. No pertinent surgical history.    Review of patient's allergies indicates:  No Known Allergies  Family History    None       Tobacco Use    Smoking status: Unknown    Smokeless tobacco: Not on file   Substance and Sexual Activity    Alcohol use: Not on file    Drug use: Not on file    Sexual activity: Not on file     Review of Systems   Unable to perform ROS: Dementia   Objective:     Vital Signs (Most Recent):  Pulse: (!) 58 (02/06/23 1449)  Resp: 14 (02/06/23 1449)  BP: (!) 109/39 (02/06/23 1449)  SpO2: 100 % (02/06/23 1449)   Vital Signs (24h Range):  Temp:  [97.6 °F (36.4 °C)-99.1 °F (37.3 °C)] 97.6 °F (36.4 °C)  Pulse:  [58-67] 58  Resp:  [14-18] 14  SpO2:  [97 %-100 %] 100 %  BP: ()/(38-78) 109/39        There is no height or weight on file to calculate BMI.      Intake/Output Summary (Last 24 hours) at 2/6/2023 1503  Last data filed at 2/6/2023 0430  Gross per 24 hour   Intake 678.33 ml   Output 4 ml   Net 674.33 ml       Lines/Drains/Airways       Peripheral Intravenous Line  Duration                  Peripheral IV - Single Lumen 02/05/23 1346 20 G Distal;Posterior;Right Forearm 1 day         Peripheral IV - Single Lumen 02/06/23 1030 22 G Left;Posterior Forearm <1 day                    Physical Exam  Vitals reviewed.   Constitutional:       General: He is not in acute distress.     Appearance: Normal appearance. He is well-developed. He is ill-appearing.   HENT:      Head: Normocephalic and  atraumatic.      Nose: Nose normal.   Eyes:      Pupils: Pupils are equal, round, and reactive to light.   Cardiovascular:      Rate and Rhythm: Normal rate and regular rhythm.   Pulmonary:      Effort: Pulmonary effort is normal.      Breath sounds: Normal breath sounds. No wheezing.   Abdominal:      General: Abdomen is flat. Bowel sounds are normal. There is no distension.      Palpations: Abdomen is soft.      Tenderness: There is no abdominal tenderness. There is no guarding.   Musculoskeletal:      Comments: + contractures   Skin:     General: Skin is warm and dry.      Coloration: Skin is not jaundiced.   Neurological:      Mental Status: He is alert.   Psychiatric:         Attention and Perception: Attention normal.         Mood and Affect: Affect normal.         Speech: Speech normal.         Behavior: Behavior is cooperative.      Comments: Pt was calm while speaking.       Significant Labs:  CBC:   Recent Labs   Lab 02/04/23  2212 02/05/23  0455 02/05/23  1039   WBC  --  5.13  --    HGB 9.3* 9.0* 9.8*   HCT 28.3* 28.5* 29.9*   PLT  --  296  --      CMP:   Recent Labs   Lab 02/06/23  0836   *   CALCIUM 8.6   ALBUMIN 2.9*   PROT 7.2      K 4.3   CO2 24      BUN 27*   CREATININE 1.29   ALKPHOS 73   ALT 17   AST 21   BILITOT 0.3       Significant Imaging:  Imaging results within the past 24 hours have been reviewed.    Assessment/Plan:     There are no hospital problems to display for this patient.        Imp: hematemesis, iron deficiency anemia  Plan: egd    Rocky York MD  Gastroenterology  [facility] - End[facility]

## 2023-02-06 NOTE — SUBJECTIVE & OBJECTIVE
Past Medical History:   Diagnosis Date    Alzheimer's disease, unspecified (CODE)     Hypertension        History reviewed. No pertinent surgical history.    Review of patient's allergies indicates:  No Known Allergies    Current Facility-Administered Medications on File Prior to Encounter   Medication    [DISCONTINUED] acetaminophen tablet 650 mg    [DISCONTINUED] acetaminophen tablet 650 mg    [DISCONTINUED] aluminum-magnesium hydroxide-simethicone 200-200-20 mg/5 mL suspension 30 mL    [DISCONTINUED] bisacodyL suppository 10 mg    [DISCONTINUED] cefTRIAXone (ROCEPHIN) 1 g in dextrose 5 % in water (D5W) 5 % 50 mL IVPB (MB+)    [DISCONTINUED] dextrose 10% bolus 125 mL 125 mL    [DISCONTINUED] dextrose 10% bolus 250 mL 250 mL    [DISCONTINUED] finasteride tablet 5 mg    [DISCONTINUED] glucagon (human recombinant) injection 1 mg    [DISCONTINUED] hydrALAZINE tablet 25 mg    [DISCONTINUED] melatonin tablet 3 mg    [DISCONTINUED] melatonin tablet 6 mg    [DISCONTINUED] multivitamin tablet    [DISCONTINUED] naloxone 0.4 mg/mL injection 0.02 mg    [DISCONTINUED] NIFEdipine 24 hr tablet 30 mg    [DISCONTINUED] ondansetron injection 4 mg    [DISCONTINUED] pantoprazole injection 40 mg    [DISCONTINUED] polyethylene glycol packet 17 g    [DISCONTINUED] senna-docusate 8.6-50 mg per tablet 1 tablet    [DISCONTINUED] simethicone chewable tablet 80 mg     Current Outpatient Medications on File Prior to Encounter   Medication Sig    acetaminophen (TYLENOL) 500 MG tablet Take 1,000 mg by mouth every 8 (eight) hours as needed for Pain.    aspirin 81 MG Chew Take 81 mg by mouth once daily.    calcium carbonate (OS-ELIZA) 600 mg calcium (1,500 mg) Tab Take 600 mg by mouth 2 (two) times daily with meals.    docusate sodium (COLACE) 100 MG capsule Take 100 mg by mouth 2 (two) times daily.    finasteride (PROSCAR) 5 mg tablet Take 5 mg by mouth once daily.    melatonin 3 mg TbSR Take 6 mg by mouth every evening.    meloxicam (MOBIC) 15 MG  tablet Take 15 mg by mouth once daily.    multivitamin with minerals tablet Take 1 tablet by mouth once daily.    NIFEdipine (PROCARDIA XL) 30 MG (OSM) 24 hr tablet Take 30 mg by mouth once daily.     Family History    None       Tobacco Use    Smoking status: Unknown    Smokeless tobacco: Not on file   Substance and Sexual Activity    Alcohol use: Not on file    Drug use: Not on file    Sexual activity: Not on file     Review of Systems   Unable to perform ROS: Dementia   Objective:     Vital Signs (Most Recent):  Temp: 98.2 °F (36.8 °C) (02/06/23 0347)  Pulse: 67 (02/06/23 0347)  Resp: 16 (02/06/23 0347)  BP: (!) 106/49 (02/06/23 0347)  SpO2: 97 % (02/06/23 0347)   Vital Signs (24h Range):  Temp:  [97.9 °F (36.6 °C)-99.1 °F (37.3 °C)] 98.2 °F (36.8 °C)  Pulse:  [55-67] 67  Resp:  [16-18] 16  SpO2:  [97 %-99 %] 97 %  BP: ()/(38-69) 106/49     Weight: 69.8 kg (153 lb 14.1 oz)  Body mass index is 23.4 kg/m².    Physical Exam  Constitutional:       General: He is not in acute distress.     Appearance: He is not toxic-appearing.   HENT:      Head: Normocephalic.      Mouth/Throat:      Mouth: Mucous membranes are dry.   Cardiovascular:      Rate and Rhythm: Normal rate.   Pulmonary:      Effort: Pulmonary effort is normal. No respiratory distress.   Abdominal:      General: Bowel sounds are normal.      Palpations: Abdomen is soft.   Musculoskeletal:      Right lower leg: No edema.      Left lower leg: No edema.      Comments: L leg bigger than right.    Skin:     General: Skin is dry.   Neurological:      Mental Status: Mental status is at baseline.           Significant Labs: All pertinent labs within the past 24 hours have been reviewed.    Significant Imaging: I have reviewed all pertinent imaging results/findings within the past 24 hours.

## 2023-02-06 NOTE — ASSESSMENT & PLAN NOTE
Patient with acute kidney injury likely due to IVVD/dehydration CLOTILDE is currently improving. Labs reviewed- Renal function/electrolytes with Estimated Creatinine Clearance: 36.4 mL/min (A) (based on SCr of 1.33 mg/dL (H)). according to latest data. Monitor urine output and serial BMP and adjust therapy as needed. Avoid nephrotoxins and renally dose meds for GFR listed above.

## 2023-02-07 LAB
ALBUMIN SERPL BCP-MCNC: 2.8 G/DL (ref 3.5–5)
ALBUMIN/GLOB SERPL: 0.7 {RATIO}
ALP SERPL-CCNC: 69 U/L (ref 45–115)
ALT SERPL W P-5'-P-CCNC: 15 U/L (ref 16–61)
ANION GAP SERPL CALCULATED.3IONS-SCNC: 16 MMOL/L (ref 7–16)
AST SERPL W P-5'-P-CCNC: 30 U/L (ref 15–37)
BASOPHILS # BLD AUTO: 0.05 K/UL (ref 0–0.2)
BASOPHILS NFR BLD AUTO: 1 % (ref 0–1)
BILIRUB SERPL-MCNC: 0.4 MG/DL (ref ?–1.2)
BUN SERPL-MCNC: 23 MG/DL (ref 7–18)
BUN/CREAT SERPL: 22 (ref 6–20)
CALCIUM SERPL-MCNC: 8.6 MG/DL (ref 8.5–10.1)
CHLORIDE SERPL-SCNC: 104 MMOL/L (ref 98–107)
CO2 SERPL-SCNC: 23 MMOL/L (ref 21–32)
CREAT SERPL-MCNC: 1.03 MG/DL (ref 0.7–1.3)
DIFFERENTIAL METHOD BLD: ABNORMAL
EGFR (NO RACE VARIABLE) (RUSH/TITUS): 69 ML/MIN/1.73M²
EOSINOPHIL # BLD AUTO: 0.34 K/UL (ref 0–0.5)
EOSINOPHIL NFR BLD AUTO: 7 % (ref 1–4)
ERYTHROCYTE [DISTWIDTH] IN BLOOD BY AUTOMATED COUNT: 14.5 % (ref 11.5–14.5)
ESTROGEN SERPL-MCNC: NORMAL PG/ML
GLOBULIN SER-MCNC: 4.2 G/DL (ref 2–4)
GLUCOSE SERPL-MCNC: 83 MG/DL (ref 74–106)
HCT VFR BLD AUTO: 29.1 % (ref 40–54)
HGB BLD-MCNC: 9.6 G/DL (ref 13.5–18)
IMM GRANULOCYTES # BLD AUTO: 0.03 K/UL (ref 0–0.04)
IMM GRANULOCYTES NFR BLD: 0.6 % (ref 0–0.4)
INSULIN SERPL-ACNC: NORMAL U[IU]/ML
LAB AP GROSS DESCRIPTION: NORMAL
LAB AP LABORATORY NOTES: NORMAL
LYMPHOCYTES # BLD AUTO: 1.25 K/UL (ref 1–4.8)
LYMPHOCYTES NFR BLD AUTO: 25.7 % (ref 27–41)
MAGNESIUM SERPL-MCNC: 2.3 MG/DL (ref 1.7–2.3)
MCH RBC QN AUTO: 30.6 PG (ref 27–31)
MCHC RBC AUTO-ENTMCNC: 33 G/DL (ref 32–36)
MCV RBC AUTO: 92.7 FL (ref 80–96)
MONOCYTES # BLD AUTO: 0.33 K/UL (ref 0–0.8)
MONOCYTES NFR BLD AUTO: 6.8 % (ref 2–6)
MPC BLD CALC-MCNC: 8.9 FL (ref 9.4–12.4)
NEUTROPHILS # BLD AUTO: 2.86 K/UL (ref 1.8–7.7)
NEUTROPHILS NFR BLD AUTO: 58.9 % (ref 53–65)
NRBC # BLD AUTO: 0 X10E3/UL
NRBC, AUTO (.00): 0 %
PHOSPHATE SERPL-MCNC: 3.3 MG/DL (ref 2.5–4.5)
PLATELET # BLD AUTO: 307 K/UL (ref 150–400)
POTASSIUM SERPL-SCNC: 4.7 MMOL/L (ref 3.5–5.1)
PROT SERPL-MCNC: 7 G/DL (ref 6.4–8.2)
RBC # BLD AUTO: 3.14 M/UL (ref 4.6–6.2)
SODIUM SERPL-SCNC: 138 MMOL/L (ref 136–145)
T3RU NFR SERPL: NORMAL %
WBC # BLD AUTO: 4.86 K/UL (ref 4.5–11)

## 2023-02-07 PROCEDURE — 99232 PR SUBSEQUENT HOSPITAL CARE,LEVL II: ICD-10-PCS | Mod: ,,, | Performed by: INTERNAL MEDICINE

## 2023-02-07 PROCEDURE — 84100 ASSAY OF PHOSPHORUS: CPT | Performed by: INTERNAL MEDICINE

## 2023-02-07 PROCEDURE — 25000003 PHARM REV CODE 250: Performed by: INTERNAL MEDICINE

## 2023-02-07 PROCEDURE — 83735 ASSAY OF MAGNESIUM: CPT | Performed by: INTERNAL MEDICINE

## 2023-02-07 PROCEDURE — 99232 SBSQ HOSP IP/OBS MODERATE 35: CPT | Mod: ,,, | Performed by: INTERNAL MEDICINE

## 2023-02-07 PROCEDURE — 63600175 PHARM REV CODE 636 W HCPCS: Performed by: INTERNAL MEDICINE

## 2023-02-07 PROCEDURE — 85025 COMPLETE CBC W/AUTO DIFF WBC: CPT | Performed by: INTERNAL MEDICINE

## 2023-02-07 PROCEDURE — 80053 COMPREHEN METABOLIC PANEL: CPT | Performed by: INTERNAL MEDICINE

## 2023-02-07 PROCEDURE — C9113 INJ PANTOPRAZOLE SODIUM, VIA: HCPCS | Performed by: INTERNAL MEDICINE

## 2023-02-07 PROCEDURE — 11000001 HC ACUTE MED/SURG PRIVATE ROOM

## 2023-02-07 RX ORDER — DOCUSATE SODIUM 50 MG/5ML
100 LIQUID ORAL 2 TIMES DAILY
Status: DISCONTINUED | OUTPATIENT
Start: 2023-02-07 | End: 2023-02-14 | Stop reason: HOSPADM

## 2023-02-07 RX ADMIN — DOCUSATE SODIUM 100 MG: 100 CAPSULE, LIQUID FILLED ORAL at 09:02

## 2023-02-07 RX ADMIN — THERA TABS 1 TABLET: TAB at 09:02

## 2023-02-07 RX ADMIN — ACETAMINOPHEN 650 MG: 325 TABLET ORAL at 08:02

## 2023-02-07 RX ADMIN — DOCUSATE SODIUM 100 MG: 50 LIQUID ORAL at 08:02

## 2023-02-07 RX ADMIN — FINASTERIDE 5 MG: 5 TABLET, FILM COATED ORAL at 09:02

## 2023-02-07 RX ADMIN — NIFEDIPINE 30 MG: 30 TABLET, FILM COATED, EXTENDED RELEASE ORAL at 09:02

## 2023-02-07 RX ADMIN — CEFTRIAXONE SODIUM 1 G: 1 INJECTION, POWDER, FOR SOLUTION INTRAMUSCULAR; INTRAVENOUS at 08:02

## 2023-02-07 RX ADMIN — MELATONIN 3 MG: at 08:02

## 2023-02-07 RX ADMIN — PANTOPRAZOLE SODIUM 40 MG: 40 INJECTION, POWDER, LYOPHILIZED, FOR SOLUTION INTRAVENOUS at 09:02

## 2023-02-07 RX ADMIN — PANTOPRAZOLE SODIUM 40 MG: 40 INJECTION, POWDER, LYOPHILIZED, FOR SOLUTION INTRAVENOUS at 08:02

## 2023-02-07 NOTE — SUBJECTIVE & OBJECTIVE
Interval History:     NAEO  Pt doing well  Being treated with iv abx for uti    Review of Systems   Unable to perform ROS: Dementia   Objective:     Vital Signs (Most Recent):  Temp: 98.5 °F (36.9 °C) (02/07/23 0800)  Pulse: 76 (02/07/23 0800)  Resp: 18 (02/07/23 0800)  BP: 119/74 (02/07/23 0800)  SpO2: 99 % (02/07/23 0800) Vital Signs (24h Range):  Temp:  [98 °F (36.7 °C)-99.2 °F (37.3 °C)] 98.5 °F (36.9 °C)  Pulse:  [48-76] 76  Resp:  [13-18] 18  SpO2:  [97 %-100 %] 99 %  BP: ()/(17-79) 119/74     Weight: 69.8 kg (153 lb 14.1 oz)  Body mass index is 23.4 kg/m².    Intake/Output Summary (Last 24 hours) at 2/7/2023 1125  Last data filed at 2/6/2023 2221  Gross per 24 hour   Intake 100 ml   Output --   Net 100 ml      Physical Exam  Constitutional:       General: He is not in acute distress.     Appearance: He is not toxic-appearing.   HENT:      Head: Normocephalic.      Mouth/Throat:      Mouth: Mucous membranes are dry.   Cardiovascular:      Rate and Rhythm: Normal rate.   Pulmonary:      Effort: Pulmonary effort is normal. No respiratory distress.   Abdominal:      General: Bowel sounds are normal.      Palpations: Abdomen is soft.   Musculoskeletal:      Right lower leg: No edema.      Left lower leg: No edema.      Comments: L leg bigger than right.    Skin:     General: Skin is dry.   Neurological:      Mental Status: Mental status is at baseline.       Significant Labs: All pertinent labs within the past 24 hours have been reviewed.    Significant Imaging: I have reviewed all pertinent imaging results/findings within the past 24 hours.

## 2023-02-07 NOTE — PROGRESS NOTES
[facility] Hospital Medicine  Progress Note    Patient Name: Jt Corrigan  MRN: [id number]  Patient Class: IP- Inpatient   Admission Date: 2/5/2023  Length of Stay: 2 days  Attending Physician: Joslyn De Leon MD  Primary Care Provider: Mónica Cuenca MD        Subjective:     Principal Problem:Hematemesis        HPI:  Patient is 89-year-old male with past medical history of hypertension, severe dementia resident at a nursing home, who presented to the emergency room from nursing home for coffee-ground emesis.  Patient is nonverbal, he can not give any history due to his advanced dementia, his daughter provided most of the history [facility]. Pt was also found to have group b uti for which he was started on rocephen. Pt was seen by GI, and d/t significant change in h/h with his BL, plan is to get EGD. In the meanwhile pt will continue with his iv abx for uti. Pt will be transferred to [facility] d/t no bed availibility at [facility].        Overview/Hospital Course:  No notes on file    Interval History:     NAEO  Pt doing well  Being treated with iv abx for uti    Review of Systems   Unable to perform ROS: Dementia   Objective:     Vital Signs (Most Recent):  Temp: 98.5 °F (36.9 °C) (02/07/23 0800)  Pulse: 76 (02/07/23 0800)  Resp: 18 (02/07/23 0800)  BP: 119/74 (02/07/23 0800)  SpO2: 99 % (02/07/23 0800) Vital Signs (24h Range):  Temp:  [98 °F (36.7 °C)-99.2 °F (37.3 °C)] 98.5 °F (36.9 °C)  Pulse:  [48-76] 76  Resp:  [13-18] 18  SpO2:  [97 %-100 %] 99 %  BP: ()/(17-79) 119/74     Weight: 69.8 kg (153 lb 14.1 oz)  Body mass index is 23.4 kg/m².    Intake/Output Summary (Last 24 hours) at 2/7/2023 1125  Last data filed at 2/6/2023 2221  Gross per 24 hour   Intake 100 ml   Output --   Net 100 ml      Physical Exam  Constitutional:       General: He is not in acute distress.     Appearance: He is not toxic-appearing.   HENT:      Head: Normocephalic.      Mouth/Throat:      Mouth: Mucous  membranes are dry.   Cardiovascular:      Rate and Rhythm: Normal rate.   Pulmonary:      Effort: Pulmonary effort is normal. No respiratory distress.   Abdominal:      General: Bowel sounds are normal.      Palpations: Abdomen is soft.   Musculoskeletal:      Right lower leg: No edema.      Left lower leg: No edema.      Comments: L leg bigger than right.    Skin:     General: Skin is dry.   Neurological:      Mental Status: Mental status is at baseline.       Significant Labs: All pertinent labs within the past 24 hours have been reviewed.    Significant Imaging: I have reviewed all pertinent imaging results/findings within the past 24 hours.      Assessment/Plan:      * Hematemesis    GI consulted   Appreciate recs  Cont iv ppi    CLOTILDE (acute kidney injury)  Patient with acute kidney injury likely due to IVVD/dehydration CLOTILDE is currently improving. Labs reviewed- Renal function/electrolytes with Estimated Creatinine Clearance: 36.4 mL/min (A) (based on SCr of 1.33 mg/dL (H)). according to latest data. Monitor urine output and serial BMP and adjust therapy as needed. Avoid nephrotoxins and renally dose meds for GFR listed above.       HTN (hypertension)    Cont procardia      Contracture of joint    Present from admission      UTI (urinary tract infection)    rocephen  Group b strep       Dementia    Cont present mx        VTE Risk Mitigation (From admission, onward)         Ordered     IP VTE HIGH RISK PATIENT  Once         02/05/23 1122     Place sequential compression device  Until discontinued         02/05/23 1122                Discharge Planning   CANDIDA:      Code Status: Prior   Is the patient medically ready for discharge?:     Reason for patient still in hospital (select all that apply): Treatment                     Rehmat MICHAEL De Leon MD  Department of Hospital Medicine   [facility]

## 2023-02-07 NOTE — PLAN OF CARE
[facility]  Initial Discharge Assessment       Primary Care Provider: Mónica Cuenca MD    Admission Diagnosis: GI bleed [K92.2]    Admission Date: 2/5/2023  Expected Discharge Date:          Payor: MEDICARE / Plan: MEDICARE PART A & B / Product Type: Government /     No emergency contact information on file.            No Pharmacies Listed             Please see Jennifer Edgar's admission assessment.

## 2023-02-07 NOTE — PLAN OF CARE
Problem: Fall Injury Risk  Goal: Absence of Fall and Fall-Related Injury  Outcome: Ongoing, Progressing     Problem: Fluid and Electrolyte Imbalance (Acute Kidney Injury/Impairment)  Goal: Fluid and Electrolyte Balance  Outcome: Ongoing, Progressing     Problem: Renal Function Impairment (Acute Kidney Injury/Impairment)  Goal: Effective Renal Function  Outcome: Ongoing, Progressing     Problem: Skin Injury Risk Increased  Goal: Skin Health and Integrity  Outcome: Ongoing, Progressing     Problem: Adult Inpatient Plan of Care  Goal: Plan of Care Review  Outcome: Ongoing, Progressing  Goal: Patient-Specific Goal (Individualized)  Outcome: Ongoing, Progressing  Goal: Absence of Hospital-Acquired Illness or Injury  Outcome: Ongoing, Progressing  Goal: Optimal Comfort and Wellbeing  Outcome: Ongoing, Progressing  Goal: Readiness for Transition of Care  Outcome: Ongoing, Progressing     Problem: Oral Intake Inadequate (Acute Kidney Injury/Impairment)  Goal: Optimal Nutrition Intake  Outcome: Unable to Meet, Plan Revised

## 2023-02-07 NOTE — PLAN OF CARE
[facility]  Initial Discharge Assessment       Primary Care Provider: Mónica Cuenca MD    Admission Diagnosis: GI bleed [K92.2]    Admission Date: 2/5/2023  Expected Discharge Date:          Payor: MEDICARE / Plan: MEDICARE PART A & B / Product Type: Government /     No emergency contact information on file.            No Pharmacies Listed    Initial Assessment (most recent)       Adult Discharge Assessment - 02/07/23 1239          Discharge Assessment    Assessment Type Discharge Planning Assessment

## 2023-02-07 NOTE — PLAN OF CARE
Problem: Fall Injury Risk  Goal: Absence of Fall and Fall-Related Injury  Outcome: Ongoing, Progressing     Problem: Adult Inpatient Plan of Care  Goal: Plan of Care Review  Outcome: Ongoing, Progressing  Goal: Patient-Specific Goal (Individualized)  Outcome: Ongoing, Progressing  Goal: Absence of Hospital-Acquired Illness or Injury  Outcome: Ongoing, Progressing

## 2023-02-08 LAB
ALBUMIN SERPL BCP-MCNC: 2.8 G/DL (ref 3.5–5)
ALBUMIN/GLOB SERPL: 0.7 {RATIO}
ALP SERPL-CCNC: 68 U/L (ref 45–115)
ALT SERPL W P-5'-P-CCNC: 14 U/L (ref 16–61)
ANION GAP SERPL CALCULATED.3IONS-SCNC: 15 MMOL/L (ref 7–16)
AST SERPL W P-5'-P-CCNC: 21 U/L (ref 15–37)
BACTERIA #/AREA URNS HPF: ABNORMAL /HPF
BASOPHILS # BLD AUTO: 0.03 K/UL (ref 0–0.2)
BASOPHILS NFR BLD AUTO: 0.6 % (ref 0–1)
BILIRUB SERPL-MCNC: 0.2 MG/DL (ref ?–1.2)
BILIRUB UR QL STRIP: NEGATIVE
BUN SERPL-MCNC: 27 MG/DL (ref 7–18)
BUN/CREAT SERPL: 18 (ref 6–20)
CALCIUM SERPL-MCNC: 8.8 MG/DL (ref 8.5–10.1)
CHLORIDE SERPL-SCNC: 107 MMOL/L (ref 98–107)
CLARITY UR: ABNORMAL
CO2 SERPL-SCNC: 26 MMOL/L (ref 21–32)
COLOR UR: YELLOW
CREAT SERPL-MCNC: 1.47 MG/DL (ref 0.7–1.3)
DIFFERENTIAL METHOD BLD: ABNORMAL
EGFR (NO RACE VARIABLE) (RUSH/TITUS): 45 ML/MIN/1.73M²
EOSINOPHIL # BLD AUTO: 0.37 K/UL (ref 0–0.5)
EOSINOPHIL NFR BLD AUTO: 7.3 % (ref 1–4)
ERYTHROCYTE [DISTWIDTH] IN BLOOD BY AUTOMATED COUNT: 14.6 % (ref 11.5–14.5)
GLOBULIN SER-MCNC: 4.1 G/DL (ref 2–4)
GLUCOSE SERPL-MCNC: 88 MG/DL (ref 74–106)
GLUCOSE UR STRIP-MCNC: NORMAL MG/DL
HCT VFR BLD AUTO: 29.9 % (ref 40–54)
HGB BLD-MCNC: 9.5 G/DL (ref 13.5–18)
IMM GRANULOCYTES # BLD AUTO: 0.02 K/UL (ref 0–0.04)
IMM GRANULOCYTES NFR BLD: 0.4 % (ref 0–0.4)
KETONES UR STRIP-SCNC: NEGATIVE MG/DL
LEUKOCYTE ESTERASE UR QL STRIP: ABNORMAL
LYMPHOCYTES # BLD AUTO: 1.67 K/UL (ref 1–4.8)
LYMPHOCYTES NFR BLD AUTO: 33 % (ref 27–41)
MAGNESIUM SERPL-MCNC: 2.4 MG/DL (ref 1.7–2.3)
MCH RBC QN AUTO: 30.3 PG (ref 27–31)
MCHC RBC AUTO-ENTMCNC: 31.8 G/DL (ref 32–36)
MCV RBC AUTO: 95.2 FL (ref 80–96)
MONOCYTES # BLD AUTO: 0.64 K/UL (ref 0–0.8)
MONOCYTES NFR BLD AUTO: 12.6 % (ref 2–6)
MPC BLD CALC-MCNC: 9.7 FL (ref 9.4–12.4)
MUCOUS THREADS #/AREA URNS HPF: ABNORMAL /HPF
NEUTROPHILS # BLD AUTO: 2.33 K/UL (ref 1.8–7.7)
NEUTROPHILS NFR BLD AUTO: 46.1 % (ref 53–65)
NITRITE UR QL STRIP: NEGATIVE
NRBC # BLD AUTO: 0 X10E3/UL
NRBC, AUTO (.00): 0 %
PH UR STRIP: 5.5 PH UNITS
PHOSPHATE SERPL-MCNC: 3.5 MG/DL (ref 2.5–4.5)
PLATELET # BLD AUTO: 290 K/UL (ref 150–400)
POTASSIUM SERPL-SCNC: 4.5 MMOL/L (ref 3.5–5.1)
PROT SERPL-MCNC: 6.9 G/DL (ref 6.4–8.2)
PROT UR QL STRIP: 30
RBC # BLD AUTO: 3.14 M/UL (ref 4.6–6.2)
RBC # UR STRIP: ABNORMAL /UL
RBC #/AREA URNS HPF: ABNORMAL /HPF
SODIUM SERPL-SCNC: 143 MMOL/L (ref 136–145)
SP GR UR STRIP: 1.01
SQUAMOUS #/AREA URNS LPF: ABNORMAL /LPF
UROBILINOGEN UR STRIP-ACNC: NORMAL MG/DL
WBC # BLD AUTO: 5.06 K/UL (ref 4.5–11)
WBC #/AREA URNS HPF: ABNORMAL /HPF
WBC CLUMPS, UA: ABNORMAL /HPF

## 2023-02-08 PROCEDURE — 80053 COMPREHEN METABOLIC PANEL: CPT | Performed by: INTERNAL MEDICINE

## 2023-02-08 PROCEDURE — 84100 ASSAY OF PHOSPHORUS: CPT | Performed by: INTERNAL MEDICINE

## 2023-02-08 PROCEDURE — 11000001 HC ACUTE MED/SURG PRIVATE ROOM

## 2023-02-08 PROCEDURE — 99232 PR SUBSEQUENT HOSPITAL CARE,LEVL II: ICD-10-PCS | Mod: ,,, | Performed by: INTERNAL MEDICINE

## 2023-02-08 PROCEDURE — 85025 COMPLETE CBC W/AUTO DIFF WBC: CPT | Performed by: INTERNAL MEDICINE

## 2023-02-08 PROCEDURE — C9113 INJ PANTOPRAZOLE SODIUM, VIA: HCPCS | Performed by: INTERNAL MEDICINE

## 2023-02-08 PROCEDURE — 99232 SBSQ HOSP IP/OBS MODERATE 35: CPT | Mod: ,,, | Performed by: INTERNAL MEDICINE

## 2023-02-08 PROCEDURE — 25000003 PHARM REV CODE 250: Performed by: INTERNAL MEDICINE

## 2023-02-08 PROCEDURE — 63600175 PHARM REV CODE 636 W HCPCS: Performed by: INTERNAL MEDICINE

## 2023-02-08 PROCEDURE — 87086 URINE CULTURE/COLONY COUNT: CPT | Performed by: INTERNAL MEDICINE

## 2023-02-08 PROCEDURE — 81001 URINALYSIS AUTO W/SCOPE: CPT | Performed by: INTERNAL MEDICINE

## 2023-02-08 PROCEDURE — 83735 ASSAY OF MAGNESIUM: CPT | Performed by: INTERNAL MEDICINE

## 2023-02-08 RX ORDER — SODIUM CHLORIDE, SODIUM GLUCONATE, SODIUM ACETATE, POTASSIUM CHLORIDE AND MAGNESIUM CHLORIDE 30; 37; 368; 526; 502 MG/100ML; MG/100ML; MG/100ML; MG/100ML; MG/100ML
INJECTION, SOLUTION INTRAVENOUS CONTINUOUS
Status: DISCONTINUED | OUTPATIENT
Start: 2023-02-08 | End: 2023-02-09

## 2023-02-08 RX ADMIN — PANTOPRAZOLE SODIUM 40 MG: 40 INJECTION, POWDER, LYOPHILIZED, FOR SOLUTION INTRAVENOUS at 08:02

## 2023-02-08 RX ADMIN — THERA TABS 1 TABLET: TAB at 09:02

## 2023-02-08 RX ADMIN — DOCUSATE SODIUM 100 MG: 50 LIQUID ORAL at 09:02

## 2023-02-08 RX ADMIN — PANTOPRAZOLE SODIUM 40 MG: 40 INJECTION, POWDER, LYOPHILIZED, FOR SOLUTION INTRAVENOUS at 09:02

## 2023-02-08 RX ADMIN — MELATONIN 3 MG: at 08:02

## 2023-02-08 RX ADMIN — SODIUM CHLORIDE, SODIUM GLUCONATE, SODIUM ACETATE, POTASSIUM CHLORIDE AND MAGNESIUM CHLORIDE: 526; 502; 368; 37; 30 INJECTION, SOLUTION INTRAVENOUS at 11:02

## 2023-02-08 RX ADMIN — DOCUSATE SODIUM 100 MG: 50 LIQUID ORAL at 08:02

## 2023-02-08 RX ADMIN — CEFTRIAXONE SODIUM 1 G: 1 INJECTION, POWDER, FOR SOLUTION INTRAMUSCULAR; INTRAVENOUS at 08:02

## 2023-02-08 RX ADMIN — FINASTERIDE 5 MG: 5 TABLET, FILM COATED ORAL at 09:02

## 2023-02-08 NOTE — PROGRESS NOTES
[facility] Hospital Medicine  Progress Note    Patient Name: Jt Corrigan  MRN: [id number]  Patient Class: IP- Inpatient   Admission Date: 2/5/2023  Length of Stay: 3 days  Attending Physician: Joslyn De Leon MD  Primary Care Provider: Mónica Cuenca MD        Subjective:     Principal Problem:UGIB (upper gastrointestinal bleed)        HPI:  Patient is 89-year-old male with past medical history of hypertension, severe dementia resident at a nursing home, who presented to the emergency room from nursing home for coffee-ground emesis.  Patient is nonverbal, he can not give any history due to his advanced dementia, his daughter provided most of the history at [facility]. Pt was also found to have group b uti for which he was started on rocephen. Pt was seen by GI, and d/t significant change in h/h with his BL, plan is to get EGD. In the meanwhile pt will continue with his iv abx for uti. Pt will be transferred to [facility] d/t no bed availibility at [facility].        Overview/Hospital Course:  No notes on file    Interval History:     NAEO  Pt doing well  Being treated with iv abx for uti  Cr elevation, will add some fluids , repeact renal pannel tomorrow    Review of Systems   Unable to perform ROS: Dementia   Objective:     Vital Signs (Most Recent):  Temp: 97 °F (36.1 °C) (02/08/23 0830)  Pulse: 94 (02/08/23 0830)  Resp: 18 (02/08/23 0400)  BP: (!) 135/95 (02/08/23 0830)  SpO2: 100 % (02/07/23 2350) Vital Signs (24h Range):  Temp:  [97 °F (36.1 °C)-100 °F (37.8 °C)] 97 °F (36.1 °C)  Pulse:  [48-94] 94  Resp:  [18] 18  SpO2:  [96 %-100 %] 100 %  BP: ()/(30-95) 135/95     Weight: 69.8 kg (153 lb 14.1 oz)  Body mass index is 23.4 kg/m².    Intake/Output Summary (Last 24 hours) at 2/8/2023 1138  Last data filed at 2/8/2023 1020  Gross per 24 hour   Intake 280 ml   Output --   Net 280 ml        Physical Exam  Constitutional:       General: He is not in acute distress.     Appearance: He is  not toxic-appearing.   HENT:      Head: Normocephalic.      Mouth/Throat:      Mouth: Mucous membranes are dry.   Cardiovascular:      Rate and Rhythm: Normal rate.   Pulmonary:      Effort: Pulmonary effort is normal. No respiratory distress.   Abdominal:      General: Bowel sounds are normal.      Palpations: Abdomen is soft.   Musculoskeletal:      Right lower leg: No edema.      Left lower leg: No edema.      Comments: L leg bigger than right.    Skin:     General: Skin is dry.   Neurological:      Mental Status: Mental status is at baseline.       Significant Labs: All pertinent labs within the past 24 hours have been reviewed.    Significant Imaging: I have reviewed all pertinent imaging results/findings within the past 24 hours.      Assessment/Plan:      * UGIB (upper gastrointestinal bleed)    GI consulted   Appreciate recs  Cont iv ppi    CLOTILDE (acute kidney injury)  Patient with acute kidney injury likely due to IVVD/dehydration CLOTILDE is currently improving. Labs reviewed- Renal function/electrolytes with Estimated Creatinine Clearance: 36.4 mL/min (A) (based on SCr of 1.33 mg/dL (H)). according to latest data. Monitor urine output and serial BMP and adjust therapy as needed. Avoid nephrotoxins and renally dose meds for GFR listed above.       HTN (hypertension)    Cont procardia      Contracture of joint    Present from admission      UTI (urinary tract infection)    rocephen  Group b strep       Dementia    Cont present mx        VTE Risk Mitigation (From admission, onward)         Ordered     IP VTE HIGH RISK PATIENT  Once         02/05/23 1122     Place sequential compression device  Until discontinued         02/05/23 1122                Discharge Planning   CANDIDA:      Code Status: Prior   Is the patient medically ready for discharge?:     Reason for patient still in hospital (select all that apply): Treatment                     Rehmat MICHAEL De Leon MD  Department of Hospital Medicine   [facility]

## 2023-02-08 NOTE — PLAN OF CARE
Problem: Skin Injury Risk Increased  Goal: Skin Health and Integrity  Outcome: Ongoing, Progressing  Intervention: Optimize Skin Protection  Flowsheets (Taken 2/8/2023 1724)  Pressure Reduction Techniques:   weight shift assistance provided   heels elevated off bed  Pressure Reduction Devices:   heel offloading device utilized   pressure-redistributing mattress utilized  Skin Protection:   adhesive use limited   tubing/devices free from skin contact   skin sealant/moisture barrier applied  Head of Bed (HOB) Positioning: HOB at 30-45 degrees  Intervention: Promote and Optimize Oral Intake  Flowsheets (Taken 2/8/2023 1724)  Oral Nutrition Promotion: rest periods promoted

## 2023-02-08 NOTE — SUBJECTIVE & OBJECTIVE
Interval History:     EMANIEO  Pt doing well  Being treated with iv abx for uti  Cr elevation, will add some fluids , repeact renal pannel tomorrow    Review of Systems   Unable to perform ROS: Dementia   Objective:     Vital Signs (Most Recent):  Temp: 97 °F (36.1 °C) (02/08/23 0830)  Pulse: 94 (02/08/23 0830)  Resp: 18 (02/08/23 0400)  BP: (!) 135/95 (02/08/23 0830)  SpO2: 100 % (02/07/23 2350) Vital Signs (24h Range):  Temp:  [97 °F (36.1 °C)-100 °F (37.8 °C)] 97 °F (36.1 °C)  Pulse:  [48-94] 94  Resp:  [18] 18  SpO2:  [96 %-100 %] 100 %  BP: ()/(30-95) 135/95     Weight: 69.8 kg (153 lb 14.1 oz)  Body mass index is 23.4 kg/m².    Intake/Output Summary (Last 24 hours) at 2/8/2023 1138  Last data filed at 2/8/2023 1020  Gross per 24 hour   Intake 280 ml   Output --   Net 280 ml        Physical Exam  Constitutional:       General: He is not in acute distress.     Appearance: He is not toxic-appearing.   HENT:      Head: Normocephalic.      Mouth/Throat:      Mouth: Mucous membranes are dry.   Cardiovascular:      Rate and Rhythm: Normal rate.   Pulmonary:      Effort: Pulmonary effort is normal. No respiratory distress.   Abdominal:      General: Bowel sounds are normal.      Palpations: Abdomen is soft.   Musculoskeletal:      Right lower leg: No edema.      Left lower leg: No edema.      Comments: L leg bigger than right.    Skin:     General: Skin is dry.   Neurological:      Mental Status: Mental status is at baseline.       Significant Labs: All pertinent labs within the past 24 hours have been reviewed.    Significant Imaging: I have reviewed all pertinent imaging results/findings within the past 24 hours.

## 2023-02-09 LAB
ALBUMIN SERPL BCP-MCNC: 2.9 G/DL (ref 3.5–5)
ALBUMIN/GLOB SERPL: 0.6 {RATIO}
ALP SERPL-CCNC: 73 U/L (ref 45–115)
ALT SERPL W P-5'-P-CCNC: 14 U/L (ref 16–61)
ANION GAP SERPL CALCULATED.3IONS-SCNC: 14 MMOL/L (ref 7–16)
AST SERPL W P-5'-P-CCNC: 19 U/L (ref 15–37)
BASOPHILS # BLD AUTO: 0.03 K/UL (ref 0–0.2)
BASOPHILS NFR BLD AUTO: 0.5 % (ref 0–1)
BILIRUB SERPL-MCNC: 0.3 MG/DL (ref ?–1.2)
BUN SERPL-MCNC: 21 MG/DL (ref 7–18)
BUN/CREAT SERPL: 15 (ref 6–20)
CALCIUM SERPL-MCNC: 8.8 MG/DL (ref 8.5–10.1)
CHLORIDE SERPL-SCNC: 105 MMOL/L (ref 98–107)
CO2 SERPL-SCNC: 25 MMOL/L (ref 21–32)
CREAT SERPL-MCNC: 1.38 MG/DL (ref 0.7–1.3)
DIFFERENTIAL METHOD BLD: ABNORMAL
EGFR (NO RACE VARIABLE) (RUSH/TITUS): 49 ML/MIN/1.73M²
EOSINOPHIL # BLD AUTO: 0.33 K/UL (ref 0–0.5)
EOSINOPHIL NFR BLD AUTO: 5.8 % (ref 1–4)
ERYTHROCYTE [DISTWIDTH] IN BLOOD BY AUTOMATED COUNT: 14.4 % (ref 11.5–14.5)
GLOBULIN SER-MCNC: 5 G/DL (ref 2–4)
GLUCOSE SERPL-MCNC: 116 MG/DL (ref 74–106)
HCT VFR BLD AUTO: 30.1 % (ref 40–54)
HGB BLD-MCNC: 10.2 G/DL (ref 13.5–18)
IMM GRANULOCYTES # BLD AUTO: 0.04 K/UL (ref 0–0.04)
IMM GRANULOCYTES NFR BLD: 0.7 % (ref 0–0.4)
LYMPHOCYTES # BLD AUTO: 1.37 K/UL (ref 1–4.8)
LYMPHOCYTES NFR BLD AUTO: 24 % (ref 27–41)
MCH RBC QN AUTO: 30.8 PG (ref 27–31)
MCHC RBC AUTO-ENTMCNC: 33.9 G/DL (ref 32–36)
MCV RBC AUTO: 90.9 FL (ref 80–96)
MONOCYTES # BLD AUTO: 0.46 K/UL (ref 0–0.8)
MONOCYTES NFR BLD AUTO: 8.1 % (ref 2–6)
MPC BLD CALC-MCNC: 8.9 FL (ref 9.4–12.4)
NEUTROPHILS # BLD AUTO: 3.47 K/UL (ref 1.8–7.7)
NEUTROPHILS NFR BLD AUTO: 60.9 % (ref 53–65)
NRBC # BLD AUTO: 0 X10E3/UL
NRBC, AUTO (.00): 0 %
PLATELET # BLD AUTO: 308 K/UL (ref 150–400)
POTASSIUM SERPL-SCNC: 3.8 MMOL/L (ref 3.5–5.1)
PROT SERPL-MCNC: 7.9 G/DL (ref 6.4–8.2)
RBC # BLD AUTO: 3.31 M/UL (ref 4.6–6.2)
SODIUM SERPL-SCNC: 140 MMOL/L (ref 136–145)
WBC # BLD AUTO: 5.7 K/UL (ref 4.5–11)

## 2023-02-09 PROCEDURE — 63600175 PHARM REV CODE 636 W HCPCS: Performed by: INTERNAL MEDICINE

## 2023-02-09 PROCEDURE — 25000003 PHARM REV CODE 250: Performed by: INTERNAL MEDICINE

## 2023-02-09 PROCEDURE — 11000001 HC ACUTE MED/SURG PRIVATE ROOM

## 2023-02-09 PROCEDURE — 99232 SBSQ HOSP IP/OBS MODERATE 35: CPT | Mod: ,,, | Performed by: INTERNAL MEDICINE

## 2023-02-09 PROCEDURE — C9113 INJ PANTOPRAZOLE SODIUM, VIA: HCPCS | Performed by: INTERNAL MEDICINE

## 2023-02-09 PROCEDURE — 99232 PR SUBSEQUENT HOSPITAL CARE,LEVL II: ICD-10-PCS | Mod: ,,, | Performed by: INTERNAL MEDICINE

## 2023-02-09 PROCEDURE — 85025 COMPLETE CBC W/AUTO DIFF WBC: CPT | Performed by: INTERNAL MEDICINE

## 2023-02-09 PROCEDURE — 80053 COMPREHEN METABOLIC PANEL: CPT | Performed by: INTERNAL MEDICINE

## 2023-02-09 RX ORDER — NIFEDIPINE 30 MG/1
30 TABLET, EXTENDED RELEASE ORAL DAILY
Status: DISCONTINUED | OUTPATIENT
Start: 2023-02-09 | End: 2023-02-09

## 2023-02-09 RX ORDER — AMLODIPINE BESYLATE 5 MG/1
5 TABLET ORAL DAILY
Status: DISCONTINUED | OUTPATIENT
Start: 2023-02-09 | End: 2023-02-14 | Stop reason: HOSPADM

## 2023-02-09 RX ADMIN — PANTOPRAZOLE SODIUM 40 MG: 40 INJECTION, POWDER, LYOPHILIZED, FOR SOLUTION INTRAVENOUS at 08:02

## 2023-02-09 RX ADMIN — DOCUSATE SODIUM 100 MG: 50 LIQUID ORAL at 08:02

## 2023-02-09 RX ADMIN — AMLODIPINE BESYLATE 5 MG: 5 TABLET ORAL at 12:02

## 2023-02-09 RX ADMIN — THERA TABS 1 TABLET: TAB at 08:02

## 2023-02-09 RX ADMIN — SODIUM CHLORIDE, SODIUM GLUCONATE, SODIUM ACETATE, POTASSIUM CHLORIDE AND MAGNESIUM CHLORIDE: 526; 502; 368; 37; 30 INJECTION, SOLUTION INTRAVENOUS at 09:02

## 2023-02-09 RX ADMIN — MELATONIN 3 MG: at 08:02

## 2023-02-09 RX ADMIN — CEFTRIAXONE SODIUM 1 G: 1 INJECTION, POWDER, FOR SOLUTION INTRAMUSCULAR; INTRAVENOUS at 08:02

## 2023-02-09 RX ADMIN — FINASTERIDE 5 MG: 5 TABLET, FILM COATED ORAL at 08:02

## 2023-02-09 NOTE — PLAN OF CARE
Problem: Fall Injury Risk  Goal: Absence of Fall and Fall-Related Injury  Outcome: Ongoing, Progressing     Problem: Fluid and Electrolyte Imbalance (Acute Kidney Injury/Impairment)  Goal: Fluid and Electrolyte Balance  Outcome: Ongoing, Progressing     Problem: Oral Intake Inadequate (Acute Kidney Injury/Impairment)  Goal: Optimal Nutrition Intake  Outcome: Ongoing, Progressing     Problem: Renal Function Impairment (Acute Kidney Injury/Impairment)  Goal: Effective Renal Function  Outcome: Ongoing, Progressing     Problem: Skin Injury Risk Increased  Goal: Skin Health and Integrity  Outcome: Ongoing, Progressing     Problem: Adult Inpatient Plan of Care  Goal: Plan of Care Review  Outcome: Ongoing, Progressing  Goal: Patient-Specific Goal (Individualized)  Outcome: Ongoing, Progressing  Goal: Absence of Hospital-Acquired Illness or Injury  Outcome: Ongoing, Progressing  Goal: Optimal Comfort and Wellbeing  Outcome: Ongoing, Progressing  Goal: Readiness for Transition of Care  Outcome: Ongoing, Progressing     Problem: Fall Injury Risk  Goal: Absence of Fall and Fall-Related Injury  Outcome: Ongoing, Progressing     Problem: Fluid and Electrolyte Imbalance (Acute Kidney Injury/Impairment)  Goal: Fluid and Electrolyte Balance  Outcome: Ongoing, Progressing     Problem: Oral Intake Inadequate (Acute Kidney Injury/Impairment)  Goal: Optimal Nutrition Intake  Outcome: Ongoing, Progressing     Problem: Renal Function Impairment (Acute Kidney Injury/Impairment)  Goal: Effective Renal Function  Outcome: Ongoing, Progressing     Problem: Skin Injury Risk Increased  Goal: Skin Health and Integrity  Outcome: Ongoing, Progressing     Problem: Adult Inpatient Plan of Care  Goal: Plan of Care Review  Outcome: Ongoing, Progressing  Goal: Patient-Specific Goal (Individualized)  Outcome: Ongoing, Progressing  Goal: Absence of Hospital-Acquired Illness or Injury  Outcome: Ongoing, Progressing  Goal: Optimal Comfort and  Wellbeing  Outcome: Ongoing, Progressing  Goal: Readiness for Transition of Care  Outcome: Ongoing, Progressing

## 2023-02-09 NOTE — PLAN OF CARE
Problem: Fall Injury Risk  Goal: Absence of Fall and Fall-Related Injury  Outcome: Ongoing, Progressing     Problem: Oral Intake Inadequate (Acute Kidney Injury/Impairment)  Goal: Optimal Nutrition Intake  Outcome: Ongoing, Progressing     Problem: Skin Injury Risk Increased  Goal: Skin Health and Integrity  Outcome: Ongoing, Progressing

## 2023-02-09 NOTE — SUBJECTIVE & OBJECTIVE
Interval History:     NAEO  Renal pannel pending  Bp running high, restart procardia.   UA still dirty, follow cultures.     Review of Systems   Unable to perform ROS: Dementia   Objective:     Vital Signs (Most Recent):  Temp: 97.7 °F (36.5 °C) (02/09/23 0730)  Pulse: (!) 59 (02/09/23 0730)  Resp: 16 (02/09/23 0730)  BP: (!) 146/78 (02/09/23 0845)  SpO2: 100 % (02/09/23 0730) Vital Signs (24h Range):  Temp:  [97.7 °F (36.5 °C)-98.3 °F (36.8 °C)] 97.7 °F (36.5 °C)  Pulse:  [] 59  Resp:  [16-20] 16  SpO2:  [95 %-100 %] 100 %  BP: ()/() 146/78     Weight: 72.4 kg (159 lb 9.8 oz)  Body mass index is 24.27 kg/m².    Intake/Output Summary (Last 24 hours) at 2/9/2023 1023  Last data filed at 2/9/2023 0558  Gross per 24 hour   Intake 730 ml   Output --   Net 730 ml        Physical Exam  Constitutional:       General: He is not in acute distress.     Appearance: He is not toxic-appearing.   HENT:      Head: Normocephalic.      Mouth/Throat:      Mouth: Mucous membranes are dry.   Cardiovascular:      Rate and Rhythm: Normal rate.   Pulmonary:      Effort: Pulmonary effort is normal. No respiratory distress.   Abdominal:      General: Bowel sounds are normal.      Palpations: Abdomen is soft.   Musculoskeletal:      Right lower leg: No edema.      Left lower leg: No edema.      Comments: L leg bigger than right.    Skin:     General: Skin is dry.   Neurological:      Mental Status: Mental status is at baseline.       Significant Labs: All pertinent labs within the past 24 hours have been reviewed.    Significant Imaging: I have reviewed all pertinent imaging results/findings within the past 24 hours.

## 2023-02-09 NOTE — PROGRESS NOTES
[facility] Hospital Medicine  Progress Note    Patient Name: Jt Corrigan  MRN: [id number]  Patient Class: IP- Inpatient   Admission Date: 2/5/2023  Length of Stay: 4 days  Attending Physician: Joslyn De Leon MD  Primary Care Provider: Mónica Cuenca MD        Subjective:     Principal Problem:UGIB (upper gastrointestinal bleed)        HPI:  Patient is 89-year-old male with past medical history of hypertension, severe dementia resident at a nursing home, who presented to the emergency room from nursing home for coffee-ground emesis.  Patient is nonverbal, he can not give any history due to his advanced dementia, his daughter provided most of the history [facility]. Pt was also found to have group b uti for which he was started on rocephen. Pt was seen by GI, and d/t significant change in h/h with his BL, plan is to get EGD. In the meanwhile pt will continue with his iv abx for uti. Pt will be transferred to [facility] d/t no bed availibility at [facility].        Overview/Hospital Course:  No notes on file    Interval History:     NAEO  Renal pannel pending  Bp running high, restart procardia.   UA still dirty, follow cultures.     Review of Systems   Unable to perform ROS: Dementia   Objective:     Vital Signs (Most Recent):  Temp: 97.7 °F (36.5 °C) (02/09/23 0730)  Pulse: (!) 59 (02/09/23 0730)  Resp: 16 (02/09/23 0730)  BP: (!) 146/78 (02/09/23 0845)  SpO2: 100 % (02/09/23 0730) Vital Signs (24h Range):  Temp:  [97.7 °F (36.5 °C)-98.3 °F (36.8 °C)] 97.7 °F (36.5 °C)  Pulse:  [] 59  Resp:  [16-20] 16  SpO2:  [95 %-100 %] 100 %  BP: ()/() 146/78     Weight: 72.4 kg (159 lb 9.8 oz)  Body mass index is 24.27 kg/m².    Intake/Output Summary (Last 24 hours) at 2/9/2023 1023  Last data filed at 2/9/2023 0558  Gross per 24 hour   Intake 730 ml   Output --   Net 730 ml        Physical Exam  Constitutional:       General: He is not in acute distress.     Appearance: He is not  toxic-appearing.   HENT:      Head: Normocephalic.      Mouth/Throat:      Mouth: Mucous membranes are dry.   Cardiovascular:      Rate and Rhythm: Normal rate.   Pulmonary:      Effort: Pulmonary effort is normal. No respiratory distress.   Abdominal:      General: Bowel sounds are normal.      Palpations: Abdomen is soft.   Musculoskeletal:      Right lower leg: No edema.      Left lower leg: No edema.      Comments: L leg bigger than right.    Skin:     General: Skin is dry.   Neurological:      Mental Status: Mental status is at baseline.       Significant Labs: All pertinent labs within the past 24 hours have been reviewed.    Significant Imaging: I have reviewed all pertinent imaging results/findings within the past 24 hours.      Assessment/Plan:      * UGIB (upper gastrointestinal bleed)    GI consulted   Appreciate recs  Cont iv ppi    CLOTILDE (acute kidney injury)  Patient with acute kidney injury likely due to IVVD/dehydration CLOTILDE is currently improving. Labs reviewed- Renal function/electrolytes with Estimated Creatinine Clearance: 36.4 mL/min (A) (based on SCr of 1.33 mg/dL (H)). according to latest data. Monitor urine output and serial BMP and adjust therapy as needed. Avoid nephrotoxins and renally dose meds for GFR listed above.       HTN (hypertension)    Cont procardia      Contracture of joint    Present from admission      UTI (urinary tract infection)    rocephen  Group b strep       Dementia    Cont present mx        VTE Risk Mitigation (From admission, onward)         Ordered     IP VTE HIGH RISK PATIENT  Once         02/05/23 1122     Place sequential compression device  Until discontinued         02/05/23 1122                Discharge Planning   CANDIDA:      Code Status: Prior   Is the patient medically ready for discharge?:     Reason for patient still in hospital (select all that apply): Treatment                     Rehmat MICHAEL De Leon MD  Department of Hospital Medicine   [facility]

## 2023-02-10 LAB
BACTERIA BLD CULT: NORMAL
BACTERIA BLD CULT: NORMAL
BASOPHILS # BLD AUTO: 0.03 K/UL (ref 0–0.2)
BASOPHILS NFR BLD AUTO: 0.5 % (ref 0–1)
DIFFERENTIAL METHOD BLD: ABNORMAL
EOSINOPHIL # BLD AUTO: 0.37 K/UL (ref 0–0.5)
EOSINOPHIL NFR BLD AUTO: 6.6 % (ref 1–4)
ERYTHROCYTE [DISTWIDTH] IN BLOOD BY AUTOMATED COUNT: 14.6 % (ref 11.5–14.5)
HCT VFR BLD AUTO: 25.9 % (ref 40–54)
HGB BLD-MCNC: 8.2 G/DL (ref 13.5–18)
IMM GRANULOCYTES # BLD AUTO: 0.03 K/UL (ref 0–0.04)
IMM GRANULOCYTES NFR BLD: 0.5 % (ref 0–0.4)
LYMPHOCYTES # BLD AUTO: 1.24 K/UL (ref 1–4.8)
LYMPHOCYTES NFR BLD AUTO: 22.3 % (ref 27–41)
MCH RBC QN AUTO: 29.5 PG (ref 27–31)
MCHC RBC AUTO-ENTMCNC: 31.7 G/DL (ref 32–36)
MCV RBC AUTO: 93.2 FL (ref 80–96)
MONOCYTES # BLD AUTO: 0.54 K/UL (ref 0–0.8)
MONOCYTES NFR BLD AUTO: 9.7 % (ref 2–6)
MPC BLD CALC-MCNC: 9.5 FL (ref 9.4–12.4)
NEUTROPHILS # BLD AUTO: 3.36 K/UL (ref 1.8–7.7)
NEUTROPHILS NFR BLD AUTO: 60.4 % (ref 53–65)
NRBC # BLD AUTO: 0 X10E3/UL
NRBC, AUTO (.00): 0 %
PLATELET # BLD AUTO: 312 K/UL (ref 150–400)
RBC # BLD AUTO: 2.78 M/UL (ref 4.6–6.2)
UA COMPLETE W REFLEX CULTURE PNL UR: NO GROWTH
WBC # BLD AUTO: 5.57 K/UL (ref 4.5–11)

## 2023-02-10 PROCEDURE — 99232 PR SUBSEQUENT HOSPITAL CARE,LEVL II: ICD-10-PCS | Mod: ,,, | Performed by: INTERNAL MEDICINE

## 2023-02-10 PROCEDURE — 25000003 PHARM REV CODE 250: Performed by: INTERNAL MEDICINE

## 2023-02-10 PROCEDURE — 11000001 HC ACUTE MED/SURG PRIVATE ROOM

## 2023-02-10 PROCEDURE — 85025 COMPLETE CBC W/AUTO DIFF WBC: CPT | Performed by: INTERNAL MEDICINE

## 2023-02-10 PROCEDURE — 99232 SBSQ HOSP IP/OBS MODERATE 35: CPT | Mod: ,,, | Performed by: INTERNAL MEDICINE

## 2023-02-10 PROCEDURE — 63600175 PHARM REV CODE 636 W HCPCS: Performed by: INTERNAL MEDICINE

## 2023-02-10 PROCEDURE — C9113 INJ PANTOPRAZOLE SODIUM, VIA: HCPCS | Performed by: INTERNAL MEDICINE

## 2023-02-10 RX ADMIN — CEFTRIAXONE SODIUM 1 G: 1 INJECTION, POWDER, FOR SOLUTION INTRAMUSCULAR; INTRAVENOUS at 09:02

## 2023-02-10 RX ADMIN — THERA TABS 1 TABLET: TAB at 08:02

## 2023-02-10 RX ADMIN — DOCUSATE SODIUM 100 MG: 50 LIQUID ORAL at 08:02

## 2023-02-10 RX ADMIN — AMLODIPINE BESYLATE 5 MG: 5 TABLET ORAL at 08:02

## 2023-02-10 RX ADMIN — MELATONIN 3 MG: at 08:02

## 2023-02-10 RX ADMIN — PANTOPRAZOLE SODIUM 40 MG: 40 INJECTION, POWDER, LYOPHILIZED, FOR SOLUTION INTRAVENOUS at 09:02

## 2023-02-10 RX ADMIN — PANTOPRAZOLE SODIUM 40 MG: 40 INJECTION, POWDER, LYOPHILIZED, FOR SOLUTION INTRAVENOUS at 08:02

## 2023-02-10 RX ADMIN — FINASTERIDE 5 MG: 5 TABLET, FILM COATED ORAL at 08:02

## 2023-02-10 NOTE — PROGRESS NOTES
[facility] Hospital Medicine  Progress Note    Patient Name: Jt Corrigan  MRN: [id number]  Patient Class: IP- Inpatient   Admission Date: 2/5/2023  Length of Stay: 5 days  Attending Physician: Quan Burgos MD  Primary Care Provider: Mónica Cuenca MD        Subjective:     Principal Problem:UGIB (upper gastrointestinal bleed)        HPI:  Patient is 89-year-old male with past medical history of hypertension, severe dementia resident at a nursing home, who presented to the emergency room from nursing home for coffee-ground emesis.  Patient is nonverbal, he can not give any history due to his advanced dementia, his daughter provided most of the history at [facility]. Pt was also found to have group b uti for which he was started on rocephen. Pt was seen by GI, and d/t significant change in h/h with his BL, plan is to get EGD. In the meanwhile pt will continue with his iv abx for uti. Pt will be transferred to [facility] d/t no bed availibility at [facility].        Overview/Hospital Course:  No notes on file    No new subjective & objective note has been filed under this hospital service since the last note was generated.    02/10/2023   Patient resting comfortably he is in no acute distress no major issues today he has had a upper GI bleed, CLOTILDE, and profound weakness is afebrile hemodynamically stable lungs are clear with poor effort cardiovascular S1-S2 regular rate rhythm abdomen is soft positive bowel sounds extremities nonedematous   Continue current medical management.  Assessment/Plan:      * UGIB (upper gastrointestinal bleed)    GI consulted   Appreciate recs  Cont iv ppi    CLOTILDE (acute kidney injury)  Patient with acute kidney injury likely due to IVVD/dehydration CLOTILDE is currently improving. Labs reviewed- Renal function/electrolytes with Estimated Creatinine Clearance: 36.4 mL/min (A) (based on SCr of 1.33 mg/dL (H)). according to latest data. Monitor urine output and serial BMP  and adjust therapy as needed. Avoid nephrotoxins and renally dose meds for GFR listed above.       HTN (hypertension)    Cont procardia      Contracture of joint    Present from admission      UTI (urinary tract infection)    rocephen  Group b strep       Dementia    Cont present mx        VTE Risk Mitigation (From admission, onward)           Ordered     IP VTE HIGH RISK PATIENT  Once         02/05/23 1122     Place sequential compression device  Until discontinued         02/05/23 1122                    Discharge Planning   CANDIDA:      Code Status: Prior   Is the patient medically ready for discharge?:     Reason for patient still in hospital (select all that apply): Treatment                     Quan Burgos MD  Department of Hospital Medicine   [facility]

## 2023-02-11 LAB
BASOPHILS # BLD AUTO: 0.04 K/UL (ref 0–0.2)
BASOPHILS NFR BLD AUTO: 0.7 % (ref 0–1)
DIFFERENTIAL METHOD BLD: ABNORMAL
EOSINOPHIL # BLD AUTO: 0.31 K/UL (ref 0–0.5)
EOSINOPHIL NFR BLD AUTO: 5.5 % (ref 1–4)
ERYTHROCYTE [DISTWIDTH] IN BLOOD BY AUTOMATED COUNT: 14.9 % (ref 11.5–14.5)
HCT VFR BLD AUTO: 25.4 % (ref 40–54)
HGB BLD-MCNC: 8.1 G/DL (ref 13.5–18)
IMM GRANULOCYTES # BLD AUTO: 0.03 K/UL (ref 0–0.04)
IMM GRANULOCYTES NFR BLD: 0.5 % (ref 0–0.4)
LYMPHOCYTES # BLD AUTO: 1.47 K/UL (ref 1–4.8)
LYMPHOCYTES NFR BLD AUTO: 26.3 % (ref 27–41)
MCH RBC QN AUTO: 30.6 PG (ref 27–31)
MCHC RBC AUTO-ENTMCNC: 31.9 G/DL (ref 32–36)
MCV RBC AUTO: 95.8 FL (ref 80–96)
MONOCYTES # BLD AUTO: 0.46 K/UL (ref 0–0.8)
MONOCYTES NFR BLD AUTO: 8.2 % (ref 2–6)
MPC BLD CALC-MCNC: 9.8 FL (ref 9.4–12.4)
NEUTROPHILS # BLD AUTO: 3.29 K/UL (ref 1.8–7.7)
NEUTROPHILS NFR BLD AUTO: 58.8 % (ref 53–65)
NRBC # BLD AUTO: 0 X10E3/UL
NRBC, AUTO (.00): 0 %
PLATELET # BLD AUTO: 296 K/UL (ref 150–400)
RBC # BLD AUTO: 2.65 M/UL (ref 4.6–6.2)
WBC # BLD AUTO: 5.6 K/UL (ref 4.5–11)

## 2023-02-11 PROCEDURE — C9113 INJ PANTOPRAZOLE SODIUM, VIA: HCPCS | Performed by: INTERNAL MEDICINE

## 2023-02-11 PROCEDURE — 99232 SBSQ HOSP IP/OBS MODERATE 35: CPT | Mod: ,,, | Performed by: INTERNAL MEDICINE

## 2023-02-11 PROCEDURE — 63600175 PHARM REV CODE 636 W HCPCS: Performed by: INTERNAL MEDICINE

## 2023-02-11 PROCEDURE — 85025 COMPLETE CBC W/AUTO DIFF WBC: CPT | Performed by: INTERNAL MEDICINE

## 2023-02-11 PROCEDURE — 96372 THER/PROPH/DIAG INJ SC/IM: CPT

## 2023-02-11 PROCEDURE — 11000001 HC ACUTE MED/SURG PRIVATE ROOM

## 2023-02-11 PROCEDURE — 25000003 PHARM REV CODE 250: Performed by: INTERNAL MEDICINE

## 2023-02-11 PROCEDURE — 99232 PR SUBSEQUENT HOSPITAL CARE,LEVL II: ICD-10-PCS | Mod: ,,, | Performed by: INTERNAL MEDICINE

## 2023-02-11 RX ADMIN — CEFTRIAXONE SODIUM 1 G: 1 INJECTION, POWDER, FOR SOLUTION INTRAMUSCULAR; INTRAVENOUS at 08:02

## 2023-02-11 RX ADMIN — THERA TABS 1 TABLET: TAB at 09:02

## 2023-02-11 RX ADMIN — MELATONIN 3 MG: at 08:02

## 2023-02-11 RX ADMIN — PANTOPRAZOLE SODIUM 40 MG: 40 INJECTION, POWDER, LYOPHILIZED, FOR SOLUTION INTRAVENOUS at 08:02

## 2023-02-11 RX ADMIN — FINASTERIDE 5 MG: 5 TABLET, FILM COATED ORAL at 09:02

## 2023-02-11 RX ADMIN — DOCUSATE SODIUM 100 MG: 50 LIQUID ORAL at 09:02

## 2023-02-11 RX ADMIN — PANTOPRAZOLE SODIUM 40 MG: 40 INJECTION, POWDER, LYOPHILIZED, FOR SOLUTION INTRAVENOUS at 09:02

## 2023-02-11 RX ADMIN — AMLODIPINE BESYLATE 5 MG: 5 TABLET ORAL at 09:02

## 2023-02-11 RX ADMIN — DOCUSATE SODIUM 100 MG: 50 LIQUID ORAL at 08:02

## 2023-02-11 NOTE — PROGRESS NOTES
[facility] Hospital Medicine  Progress Note    Patient Name: Jt Corrigan  MRN: [id number]  Patient Class: IP- Inpatient   Admission Date: 2/5/2023  Length of Stay: 6 days  Attending Physician: Quan Burgos MD  Primary Care Provider: Mónica Cuenca MD        Subjective:     Principal Problem:UGIB (upper gastrointestinal bleed)        HPI:  Patient is 89-year-old male with past medical history of hypertension, severe dementia resident at a nursing home, who presented to the emergency room from nursing home for coffee-ground emesis.  Patient is nonverbal, he can not give any history due to his advanced dementia, his daughter provided most of the history at [facility]. Pt was also found to have group b uti for which he was started on rocephen. Pt was seen by GI, and d/t significant change in h/h with his BL, plan is to get EGD. In the meanwhile pt will continue with his iv abx for uti. Pt will be transferred to [facility] d/t no bed availibility at [facility].        Overview/Hospital Course:  No notes on file    No new subjective & objective note has been filed under this hospital service since the last note was generated.    02/11/2023   Patient resting comfortably he is in no acute distress no major issues today he has had a upper GI bleed, CLOTILDE, and profound weakness is afebrile hemodynamically stable lungs are clear with poor effort cardiovascular S1-S2 regular rate rhythm abdomen is soft positive bowel sounds extremities nonedematous   Continue current medical management.  Will keep a close sounds mental status in follow-up/review his labs.  Assessment/Plan:      * UGIB (upper gastrointestinal bleed)    GI consulted   Appreciate recs  Cont iv ppi    CLOTILDE (acute kidney injury)  Patient with acute kidney injury likely due to IVVD/dehydration CLOTILDE is currently improving. Labs reviewed- Renal function/electrolytes with Estimated Creatinine Clearance: 36.4 mL/min (A) (based on SCr of 1.33  mg/dL (H)). according to latest data. Monitor urine output and serial BMP and adjust therapy as needed. Avoid nephrotoxins and renally dose meds for GFR listed above.       HTN (hypertension)    Cont procardia      Contracture of joint    Present from admission      UTI (urinary tract infection)    rocephen  Group b strep       Dementia    Cont present mx        VTE Risk Mitigation (From admission, onward)           Ordered     IP VTE HIGH RISK PATIENT  Once         02/05/23 1122     Place sequential compression device  Until discontinued         02/05/23 1122                    Discharge Planning   CANDIDA:      Code Status: Prior   Is the patient medically ready for discharge?:     Reason for patient still in hospital (select all that apply): Treatment                     Quan Burgos MD  Department of Hospital Medicine   [facility]

## 2023-02-11 NOTE — PLAN OF CARE
Problem: Fall Injury Risk  Goal: Absence of Fall and Fall-Related Injury  Outcome: Ongoing, Progressing     Problem: Fluid and Electrolyte Imbalance (Acute Kidney Injury/Impairment)  Goal: Fluid and Electrolyte Balance  Outcome: Ongoing, Progressing  Intervention: Monitor and Manage Fluid and Electrolyte Balance  Flowsheets (Taken 2/11/2023 0200)  Fluid/Electrolyte Management: fluids provided     Problem: Oral Intake Inadequate (Acute Kidney Injury/Impairment)  Goal: Optimal Nutrition Intake  Outcome: Ongoing, Progressing     Problem: Renal Function Impairment (Acute Kidney Injury/Impairment)  Goal: Effective Renal Function  Outcome: Ongoing, Progressing     Problem: Skin Injury Risk Increased  Goal: Skin Health and Integrity  Outcome: Ongoing, Progressing     Problem: Adult Inpatient Plan of Care  Goal: Plan of Care Review  Outcome: Ongoing, Progressing  Goal: Patient-Specific Goal (Individualized)  Outcome: Ongoing, Progressing  Goal: Absence of Hospital-Acquired Illness or Injury  Outcome: Ongoing, Progressing  Goal: Optimal Comfort and Wellbeing  Outcome: Ongoing, Progressing  Goal: Readiness for Transition of Care  Outcome: Ongoing, Progressing

## 2023-02-12 LAB
BASOPHILS # BLD AUTO: 0.05 K/UL (ref 0–0.2)
BASOPHILS NFR BLD AUTO: 1.2 % (ref 0–1)
DIFFERENTIAL METHOD BLD: ABNORMAL
EOSINOPHIL # BLD AUTO: 0.54 K/UL (ref 0–0.5)
EOSINOPHIL NFR BLD AUTO: 12.6 % (ref 1–4)
ERYTHROCYTE [DISTWIDTH] IN BLOOD BY AUTOMATED COUNT: 14.6 % (ref 11.5–14.5)
HCT VFR BLD AUTO: 25.5 % (ref 40–54)
HGB BLD-MCNC: 8.4 G/DL (ref 13.5–18)
IMM GRANULOCYTES # BLD AUTO: 0.01 K/UL (ref 0–0.04)
IMM GRANULOCYTES NFR BLD: 0.2 % (ref 0–0.4)
LYMPHOCYTES # BLD AUTO: 1.34 K/UL (ref 1–4.8)
LYMPHOCYTES NFR BLD AUTO: 31.3 % (ref 27–41)
MCH RBC QN AUTO: 30.4 PG (ref 27–31)
MCHC RBC AUTO-ENTMCNC: 32.9 G/DL (ref 32–36)
MCV RBC AUTO: 92.4 FL (ref 80–96)
MONOCYTES # BLD AUTO: 0.5 K/UL (ref 0–0.8)
MONOCYTES NFR BLD AUTO: 11.7 % (ref 2–6)
MPC BLD CALC-MCNC: 9.2 FL (ref 9.4–12.4)
NEUTROPHILS # BLD AUTO: 1.84 K/UL (ref 1.8–7.7)
NEUTROPHILS NFR BLD AUTO: 43 % (ref 53–65)
NRBC # BLD AUTO: 0 X10E3/UL
NRBC, AUTO (.00): 0 %
PLATELET # BLD AUTO: 291 K/UL (ref 150–400)
RBC # BLD AUTO: 2.76 M/UL (ref 4.6–6.2)
WBC # BLD AUTO: 4.28 K/UL (ref 4.5–11)

## 2023-02-12 PROCEDURE — 11000001 HC ACUTE MED/SURG PRIVATE ROOM

## 2023-02-12 PROCEDURE — 25000003 PHARM REV CODE 250: Performed by: INTERNAL MEDICINE

## 2023-02-12 PROCEDURE — 99232 PR SUBSEQUENT HOSPITAL CARE,LEVL II: ICD-10-PCS | Mod: ,,, | Performed by: INTERNAL MEDICINE

## 2023-02-12 PROCEDURE — 85025 COMPLETE CBC W/AUTO DIFF WBC: CPT | Performed by: INTERNAL MEDICINE

## 2023-02-12 PROCEDURE — 99232 SBSQ HOSP IP/OBS MODERATE 35: CPT | Mod: ,,, | Performed by: INTERNAL MEDICINE

## 2023-02-12 PROCEDURE — 63600175 PHARM REV CODE 636 W HCPCS: Performed by: INTERNAL MEDICINE

## 2023-02-12 PROCEDURE — C9113 INJ PANTOPRAZOLE SODIUM, VIA: HCPCS | Performed by: INTERNAL MEDICINE

## 2023-02-12 RX ADMIN — PANTOPRAZOLE SODIUM 40 MG: 40 INJECTION, POWDER, LYOPHILIZED, FOR SOLUTION INTRAVENOUS at 08:02

## 2023-02-12 RX ADMIN — CEFTRIAXONE SODIUM 1 G: 1 INJECTION, POWDER, FOR SOLUTION INTRAMUSCULAR; INTRAVENOUS at 08:02

## 2023-02-12 RX ADMIN — FINASTERIDE 5 MG: 5 TABLET, FILM COATED ORAL at 08:02

## 2023-02-12 RX ADMIN — AMLODIPINE BESYLATE 5 MG: 5 TABLET ORAL at 08:02

## 2023-02-12 RX ADMIN — THERA TABS 1 TABLET: TAB at 08:02

## 2023-02-12 RX ADMIN — MELATONIN 3 MG: at 08:02

## 2023-02-12 RX ADMIN — DOCUSATE SODIUM 100 MG: 50 LIQUID ORAL at 08:02

## 2023-02-12 NOTE — PROGRESS NOTES
[facility] Hospital Medicine  Progress Note    Patient Name: Jt Corrigan  MRN: [id number]  Patient Class: IP- Inpatient   Admission Date: 2/5/2023  Length of Stay: 7 days  Attending Physician: Quan Burgos MD  Primary Care Provider: Mónica Cuenca MD        Subjective:     Principal Problem:UGIB (upper gastrointestinal bleed)        HPI:  Patient is 89-year-old male with past medical history of hypertension, severe dementia resident at a nursing home, who presented to the emergency room from nursing home for coffee-ground emesis.  Patient is nonverbal, he can not give any history due to his advanced dementia, his daughter provided most of the history at [facility]. Pt was also found to have group b uti for which he was started on rocephen. Pt was seen by GI, and d/t significant change in h/h with his BL, plan is to get EGD. In the meanwhile pt will continue with his iv abx for uti. Pt will be transferred to [facility] d/t no bed availibility at [facility].        Overview/Hospital Course:  No notes on file    No new subjective & objective note has been filed under this hospital service since the last note was generated.    02/12/2023   No new issues over the last 24 hours.  Patient resting comfortably he is in no acute distress no major issues today he has had a upper GI bleed, CLOTILDE, and profound weakness is afebrile hemodynamically stable lungs are clear with poor effort cardiovascular S1-S2 regular rate rhythm abdomen is soft positive bowel sounds extremities nonedematous   Continue current medical management.  Patient with an upper GI bleed, gastritis, and peptic ulcer disease.  Patient has been medically stable this weekend.  Assessment/Plan:      * UGIB (upper gastrointestinal bleed)    GI consulted   Appreciate recs  Cont iv ppi    CLOTILDE (acute kidney injury)  Patient with acute kidney injury likely due to IVVD/dehydration CLOTILDE is currently improving. Labs reviewed- Renal  function/electrolytes with Estimated Creatinine Clearance: 36.4 mL/min (A) (based on SCr of 1.33 mg/dL (H)). according to latest data. Monitor urine output and serial BMP and adjust therapy as needed. Avoid nephrotoxins and renally dose meds for GFR listed above.       HTN (hypertension)    Cont procardia      Contracture of joint    Present from admission      UTI (urinary tract infection)    rocephen  Group b strep       Dementia    Cont present mx        VTE Risk Mitigation (From admission, onward)           Ordered     IP VTE HIGH RISK PATIENT  Once         02/05/23 1122     Place sequential compression device  Until discontinued         02/05/23 1122                    Discharge Planning   CANDIDA:      Code Status: Prior   Is the patient medically ready for discharge?:     Reason for patient still in hospital (select all that apply): Treatment                     Quan Burgos MD  Department of Hospital Medicine   [facility]

## 2023-02-12 NOTE — PLAN OF CARE
Problem: Fall Injury Risk  Goal: Absence of Fall and Fall-Related Injury  Outcome: Ongoing, Progressing     Problem: Skin Injury Risk Increased  Goal: Skin Health and Integrity  Outcome: Ongoing, Progressing     Problem: Adult Inpatient Plan of Care  Goal: Absence of Hospital-Acquired Illness or Injury  Outcome: Ongoing, Progressing     Problem: Adult Inpatient Plan of Care  Goal: Optimal Comfort and Wellbeing  Outcome: Ongoing, Progressing

## 2023-02-13 LAB
BASOPHILS # BLD AUTO: 0.04 K/UL (ref 0–0.2)
BASOPHILS NFR BLD AUTO: 1 % (ref 0–1)
DIFFERENTIAL METHOD BLD: ABNORMAL
EOSINOPHIL # BLD AUTO: 0.39 K/UL (ref 0–0.5)
EOSINOPHIL NFR BLD AUTO: 9.6 % (ref 1–4)
ERYTHROCYTE [DISTWIDTH] IN BLOOD BY AUTOMATED COUNT: 14.6 % (ref 11.5–14.5)
HCT VFR BLD AUTO: 28.2 % (ref 40–54)
HGB BLD-MCNC: 9.1 G/DL (ref 13.5–18)
IMM GRANULOCYTES # BLD AUTO: 0.02 K/UL (ref 0–0.04)
IMM GRANULOCYTES NFR BLD: 0.5 % (ref 0–0.4)
LYMPHOCYTES # BLD AUTO: 1.27 K/UL (ref 1–4.8)
LYMPHOCYTES NFR BLD AUTO: 31.2 % (ref 27–41)
MCH RBC QN AUTO: 30.7 PG (ref 27–31)
MCHC RBC AUTO-ENTMCNC: 32.3 G/DL (ref 32–36)
MCV RBC AUTO: 95.3 FL (ref 80–96)
MONOCYTES # BLD AUTO: 0.43 K/UL (ref 0–0.8)
MONOCYTES NFR BLD AUTO: 10.6 % (ref 2–6)
MPC BLD CALC-MCNC: 9.9 FL (ref 9.4–12.4)
NEUTROPHILS # BLD AUTO: 1.92 K/UL (ref 1.8–7.7)
NEUTROPHILS NFR BLD AUTO: 47.1 % (ref 53–65)
NRBC # BLD AUTO: 0 X10E3/UL
NRBC, AUTO (.00): 0 %
PLATELET # BLD AUTO: 280 K/UL (ref 150–400)
RBC # BLD AUTO: 2.96 M/UL (ref 4.6–6.2)
WBC # BLD AUTO: 4.07 K/UL (ref 4.5–11)

## 2023-02-13 PROCEDURE — 11000001 HC ACUTE MED/SURG PRIVATE ROOM

## 2023-02-13 PROCEDURE — 85025 COMPLETE CBC W/AUTO DIFF WBC: CPT | Performed by: INTERNAL MEDICINE

## 2023-02-13 PROCEDURE — 63600175 PHARM REV CODE 636 W HCPCS: Performed by: INTERNAL MEDICINE

## 2023-02-13 PROCEDURE — 25000003 PHARM REV CODE 250: Performed by: INTERNAL MEDICINE

## 2023-02-13 PROCEDURE — C9113 INJ PANTOPRAZOLE SODIUM, VIA: HCPCS | Performed by: INTERNAL MEDICINE

## 2023-02-13 RX ADMIN — AMLODIPINE BESYLATE 5 MG: 5 TABLET ORAL at 08:02

## 2023-02-13 RX ADMIN — MELATONIN 3 MG: at 09:02

## 2023-02-13 RX ADMIN — DOCUSATE SODIUM 100 MG: 50 LIQUID ORAL at 09:02

## 2023-02-13 RX ADMIN — THERA TABS 1 TABLET: TAB at 08:02

## 2023-02-13 RX ADMIN — FINASTERIDE 5 MG: 5 TABLET, FILM COATED ORAL at 08:02

## 2023-02-13 RX ADMIN — PANTOPRAZOLE SODIUM 40 MG: 40 INJECTION, POWDER, LYOPHILIZED, FOR SOLUTION INTRAVENOUS at 08:02

## 2023-02-13 RX ADMIN — PANTOPRAZOLE SODIUM 40 MG: 40 INJECTION, POWDER, LYOPHILIZED, FOR SOLUTION INTRAVENOUS at 09:02

## 2023-02-13 RX ADMIN — DOCUSATE SODIUM 100 MG: 50 LIQUID ORAL at 08:02

## 2023-02-13 NOTE — PLAN OF CARE
Problem: Fall Injury Risk  Goal: Absence of Fall and Fall-Related Injury  Outcome: Ongoing, Progressing     Problem: Fluid and Electrolyte Imbalance (Acute Kidney Injury/Impairment)  Goal: Fluid and Electrolyte Balance  Outcome: Ongoing, Progressing  Intervention: Monitor and Manage Fluid and Electrolyte Balance  Flowsheets (Taken 2/11/2023 0200)  Fluid/Electrolyte Management: fluids provided     Problem: Oral Intake Inadequate (Acute Kidney Injury/Impairment)  Goal: Optimal Nutrition Intake  Outcome: Ongoing, Progressing     Problem: Renal Function Impairment (Acute Kidney Injury/Impairment)  Goal: Effective Renal Function  Outcome: Ongoing, Progressing     Problem: Skin Injury Risk Increased  Goal: Skin Health and Integrity  Outcome: Ongoing, Progressing     Problem: Adult Inpatient Plan of Care  Goal: Plan of Care Review  Outcome: Ongoing, Progressing  Goal: Patient-Specific Goal (Individualized)  Outcome: Ongoing, Progressing  Goal: Absence of Hospital-Acquired Illness or Injury  Outcome: Ongoing, Progressing  Goal: Optimal Comfort and Wellbeing  Outcome: Ongoing, Progressing  Goal: Readiness for Transition of Care  Outcome: Ongoing, Progressing     Problem: Airway Clearance Ineffective  Goal: Effective Airway Clearance  Outcome: Ongoing, Progressing  Intervention: Promote Airway Secretion Clearance  Flowsheets (Taken 2/12/2023 9347)  Cough And Deep Breathing: done independently per patient  Activity Management: Patient unable to perform activities

## 2023-02-13 NOTE — PROGRESS NOTES
"[facility]  Adult Nutrition  First Assessment Note         Reason for Assessment  Reason For Assessment: length of stay  Nutrition Risk Screen: difficulty chewing/swallowing    RD assessment d/t LOS.    Recommend continue current diet order as medically appropriate and tolerated. Encourage good PO intakes. Monitor need for oral nutrition supplements if PO intake declines.    Per 2/12 MD note,  "02/12/2023   No new issues over the last 24 hours.  Patient resting comfortably he is in no acute distress no major issues today he has had a upper GI bleed, CLOTILDE, and profound weakness is afebrile hemodynamically stable lungs are clear with poor effort cardiovascular S1-S2 regular rate rhythm abdomen is soft positive bowel sounds extremities nonedematous   Continue current medical management.  Patient with an upper GI bleed, gastritis, and peptic ulcer disease.  Patient has been medically stable this weekend."    Patient currently receiving dysphagia pureed diet with thin liquids with % PO intake documented, intake adequate to meet estimated nutrition needs. Recommend continue current diet order as medically appropriate and tolerated. Encourage good PO intakes. Monitor need for oral nutrition supplements if PO intake declines.    Wt Readings from Last 3 Encounters:   02/09/23 0558 72.4 kg (159 lb 9.8 oz)   02/05/23 1337 69.8 kg (153 lb 14.1 oz)   Patient with 6# increase in 4 days, unsure etiology. Possibly r/t fluid status. Will continue to monitor weight trend. # (2/9) is within IBW range, BMI considered WNL.     Pt with CLOTILDE - protein needs adjusted. Will continue to monitor and adjust accordingly.    Last BM 2/13 per flowsheets. Will continue to monitor PO intakes, weight trend, meds, labs, updates in patient condition. RD following.    Malnutrition  Is Patient Malnourished: No    Nutrition Diagnosis  Altered Nutrition Related Lab Values   related to Renal dysfunction as evidenced " by elevated BUN, creat levels and lowered eGFR level    Nutrition Diagnosis Status: Chronic/ continues    Nutrition Risk  Level of Risk/Frequency of Follow-up: moderate   Chewing or Swallowing Difficulty?: pt receiving pureed diet with thin liquids    Estimated/Assessed Needs    Temp: 98.3 °F (36.8 °C)Axillary  Weight Used For Calorie Calculations: 72.4 kg (159 lb 9.8 oz)   Energy Need Method: Kcal/kg (25-30kcal/kg) Energy Calorie Requirements (kcal): 1810-2172kcal  Weight Used For Protein Calculations: 72.4 kg (159 lb 9.8 oz)  Protein Requirements: 58-87g pro (0.8-1.2g pro/kg)       RDA Method (mL): 1810     Nutrition Prescription / Recommendations  Recommendation/Intervention: Recommend continue current diet order as medically appropriate and tolerated. Encourage good PO intakes. Monitor need for oral nutrition supplements if PO intake declines.  Goals: PO intake %, weight maintenance  Nutrition Goal Status: new  Current Diet Order: Dysphagia Pureed Diet with Thin Liquids  Nutrition Order Comments: Appropriate  Recommended Diet: Puree  Recommended Oral Supplement: No Oral Supplements  Is Nutrition Support Recommended: No  Is Education Recommended: No    Monitor and Evaluation  % current Intake: P.O. intake of 75 - 100 %  % intake to meet estimated needs: 75 - 100 %  Food and Nutrient Intake: energy intake, food and beverage intake  Food and Nutrient Adminstration: diet order  Anthropometric Measurements: weight, weight change, body mass index  Biochemical Data, Medical Tests and Procedures: electrolyte and renal panel, gastrointestinal profile, glucose/endocrine profile, inflammatory profile, lipid profile  Nutrition-Focused Physical Findings: overall appearance, extremities, muscles and bones, head and eyes, skin     Current Medical Diagnosis and Past Medical History  Diagnosis: other (see comments) (UGIB)  Past Medical History:   Diagnosis Date    Alzheimer's disease, unspecified (CODE)     Hypertension   "    Nutrition/Diet History  Food Allergies: NKFA    Lab/Procedures/Meds  No results for input(s): NA, K, BUN, CREATININE, GLU, CALCIUM, ALBUMIN, CL, ALT, AST, PHOS in the last 72 hours.  Last A1c: No results found for: HGBA1C  Lab Results   Component Value Date    RBC 2.96 (L) 02/13/2023    HGB 9.1 (L) 02/13/2023    HCT 28.2 (L) 02/13/2023    MCV 95.3 02/13/2023    MCH 30.7 02/13/2023    MCHC 32.3 02/13/2023    TIBC 266 02/04/2023     Pertinent Labs Reviewed: reviewed  BUN 21 (H), creatinine 1.38 (H), eGFR 49 (L), Mg 2.4 (H), albumin 2.9 (L) - pt with improving CLOTILDE per MD note;  (H) - pt with no PMH of DM, possibly r/t stress response; ALT 14 (L), total globulin 5.0 (H) - unsure etiology, will continue to monitor altered labs  Pertinent Medications Reviewed: reviewed  Amlodipine, rocephin, docusate, finasteride, melatonin, MVI, pantoprazole    Anthropometrics  Temp: 98.3 °F (36.8 °C)  Height Method: Estimated  Height: 5' 8" (172.7 cm)  Height (inches): 68 in  Weight Method: Bed Scale  Weight: 72.4 kg (159 lb 9.8 oz)  Weight (lb): 159.61 lb  Ideal Body Weight (IBW), Male: 154 lb  % Ideal Body Weight, Male (lb): 99.92 %  BMI (Calculated): 24.3     Nutrition by Nursing  Diet/Nutrition Received: other (see comments) (dysphagis pureed)  Intake (%): 100%  Diet/Feeding Assistance: total feed  Diet/Feeding Tolerance: good  Last Bowel Movement: 02/13/23    Nutrition Follow-Up  RD Follow-up?: Yes  "

## 2023-02-14 VITALS
TEMPERATURE: 99 F | SYSTOLIC BLOOD PRESSURE: 120 MMHG | WEIGHT: 159.63 LBS | OXYGEN SATURATION: 99 % | RESPIRATION RATE: 19 BRPM | HEIGHT: 68 IN | HEART RATE: 55 BPM | BODY MASS INDEX: 24.19 KG/M2 | DIASTOLIC BLOOD PRESSURE: 87 MMHG

## 2023-02-14 PROBLEM — N39.0 UTI (URINARY TRACT INFECTION): Status: RESOLVED | Noted: 2023-02-04 | Resolved: 2023-02-14

## 2023-02-14 PROBLEM — N17.9 AKI (ACUTE KIDNEY INJURY): Status: RESOLVED | Noted: 2023-02-04 | Resolved: 2023-02-14

## 2023-02-14 PROBLEM — K92.2 UGIB (UPPER GASTROINTESTINAL BLEED): Status: RESOLVED | Noted: 2023-02-04 | Resolved: 2023-02-14

## 2023-02-14 LAB
BASOPHILS # BLD AUTO: 0.06 K/UL (ref 0–0.2)
BASOPHILS NFR BLD AUTO: 1 % (ref 0–1)
DIFFERENTIAL METHOD BLD: ABNORMAL
EOSINOPHIL # BLD AUTO: 0.31 K/UL (ref 0–0.5)
EOSINOPHIL NFR BLD AUTO: 5.1 % (ref 1–4)
ERYTHROCYTE [DISTWIDTH] IN BLOOD BY AUTOMATED COUNT: 14.6 % (ref 11.5–14.5)
HCT VFR BLD AUTO: 29.7 % (ref 40–54)
HGB BLD-MCNC: 9.4 G/DL (ref 13.5–18)
IMM GRANULOCYTES # BLD AUTO: 0.03 K/UL (ref 0–0.04)
IMM GRANULOCYTES NFR BLD: 0.5 % (ref 0–0.4)
LYMPHOCYTES # BLD AUTO: 1.87 K/UL (ref 1–4.8)
LYMPHOCYTES NFR BLD AUTO: 30.9 % (ref 27–41)
MCH RBC QN AUTO: 30.1 PG (ref 27–31)
MCHC RBC AUTO-ENTMCNC: 31.6 G/DL (ref 32–36)
MCV RBC AUTO: 95.2 FL (ref 80–96)
MONOCYTES # BLD AUTO: 0.52 K/UL (ref 0–0.8)
MONOCYTES NFR BLD AUTO: 8.6 % (ref 2–6)
MPC BLD CALC-MCNC: 9.7 FL (ref 9.4–12.4)
NEUTROPHILS # BLD AUTO: 3.26 K/UL (ref 1.8–7.7)
NEUTROPHILS NFR BLD AUTO: 53.9 % (ref 53–65)
NRBC # BLD AUTO: 0 X10E3/UL
NRBC, AUTO (.00): 0 %
PLATELET # BLD AUTO: 300 K/UL (ref 150–400)
RBC # BLD AUTO: 3.12 M/UL (ref 4.6–6.2)
WBC # BLD AUTO: 6.05 K/UL (ref 4.5–11)

## 2023-02-14 PROCEDURE — 99239 HOSP IP/OBS DSCHRG MGMT >30: CPT | Mod: ,,, | Performed by: FAMILY MEDICINE

## 2023-02-14 PROCEDURE — 99239 PR HOSPITAL DISCHARGE DAY,>30 MIN: ICD-10-PCS | Mod: ,,, | Performed by: FAMILY MEDICINE

## 2023-02-14 PROCEDURE — 85025 COMPLETE CBC W/AUTO DIFF WBC: CPT | Performed by: INTERNAL MEDICINE

## 2023-02-14 PROCEDURE — 63600175 PHARM REV CODE 636 W HCPCS: Performed by: INTERNAL MEDICINE

## 2023-02-14 PROCEDURE — C9113 INJ PANTOPRAZOLE SODIUM, VIA: HCPCS | Performed by: INTERNAL MEDICINE

## 2023-02-14 PROCEDURE — 25000003 PHARM REV CODE 250: Performed by: INTERNAL MEDICINE

## 2023-02-14 RX ORDER — AMLODIPINE BESYLATE 5 MG/1
5 TABLET ORAL DAILY
Qty: 30 TABLET | Refills: 11
Start: 2023-02-15 | End: 2024-01-01

## 2023-02-14 RX ORDER — PANTOPRAZOLE SODIUM 40 MG/1
40 TABLET, DELAYED RELEASE ORAL 2 TIMES DAILY
Qty: 120 TABLET | Refills: 0
Start: 2023-02-14 | End: 2023-01-01

## 2023-02-14 RX ORDER — PANTOPRAZOLE SODIUM 40 MG/10ML
40 INJECTION, POWDER, LYOPHILIZED, FOR SOLUTION INTRAVENOUS 2 TIMES DAILY
Qty: 60 EACH | Refills: 1
Start: 2023-02-14 | End: 2023-02-14 | Stop reason: HOSPADM

## 2023-02-14 RX ADMIN — PANTOPRAZOLE SODIUM 40 MG: 40 INJECTION, POWDER, LYOPHILIZED, FOR SOLUTION INTRAVENOUS at 08:02

## 2023-02-14 RX ADMIN — AMLODIPINE BESYLATE 5 MG: 5 TABLET ORAL at 08:02

## 2023-02-14 RX ADMIN — DOCUSATE SODIUM 100 MG: 50 LIQUID ORAL at 08:02

## 2023-02-14 RX ADMIN — FINASTERIDE 5 MG: 5 TABLET, FILM COATED ORAL at 08:02

## 2023-02-14 RX ADMIN — THERA TABS 1 TABLET: TAB at 08:02

## 2023-02-14 NOTE — PROGRESS NOTES
[facility] Hospital Medicine  Progress Note    Patient Name: Jt Corrigan  MRN: [id number]  Patient Class: IP- Inpatient   Admission Date: 2/5/2023  Length of Stay: 8 days  Attending Physician: Quan Silva Jr., MD  Primary Care Provider: Mónica Cuenca MD        Subjective:     Principal Problem:UGIB (upper gastrointestinal bleed)        HPI:  Patient is 89-year-old male with past medical history of hypertension, severe dementia resident at a nursing home, who presented to the emergency room from nursing home for coffee-ground emesis.  Patient is nonverbal, he can not give any history due to his advanced dementia, his daughter provided most of the history at [facility]. Pt was also found to have group b uti for which he was started on rocephen. Pt was seen by GI, and d/t significant change in h/h with his BL, plan is to get EGD. In the meanwhile pt will continue with his iv abx for uti. Pt will be transferred to [facility] d/t no bed availibility at [facility].        Overview/Hospital Course:  No notes on file    Interval History: Non verbal with me.  Nursing present at time of my visit.  No problems noted.     Review of Systems  Objective:     Vital Signs (Most Recent):  Temp: 99 °F (37.2 °C) (02/13/23 1600)  Pulse: 61 (02/13/23 1600)  Resp: 18 (02/13/23 1600)  BP: (!) 133/58 (02/13/23 1600)  SpO2: 98 % (02/13/23 1600)   Vital Signs (24h Range):  Temp:  [98.3 °F (36.8 °C)-99 °F (37.2 °C)] 99 °F (37.2 °C)  Pulse:  [57-61] 61  Resp:  [17-20] 18  SpO2:  [98 %-100 %] 98 %  BP: (127-161)/(58-73) 133/58     Weight: 72.4 kg (159 lb 9.8 oz)  Body mass index is 24.27 kg/m².    Intake/Output Summary (Last 24 hours) at 2/13/2023 1943  Last data filed at 2/12/2023 2027  Gross per 24 hour   Intake 237 ml   Output --   Net 237 ml      Physical Exam  Constitutional:       General: He is not in acute distress.     Appearance: He is not toxic-appearing.   HENT:      Head: Normocephalic.      Mouth/Throat:       Mouth: Mucous membranes are dry.   Cardiovascular:      Rate and Rhythm: Normal rate.   Pulmonary:      Effort: Pulmonary effort is normal. No respiratory distress.   Abdominal:      General: Bowel sounds are normal.      Palpations: Abdomen is soft.   Musculoskeletal:      Right lower leg: No edema.      Left lower leg: No edema.   Skin:     General: Skin is dry.   Neurological:      Mental Status: Mental status is at baseline.       Significant Labs: All pertinent labs within the past 24 hours have been reviewed.  BMP: No results for input(s): GLU, NA, K, CL, CO2, BUN, CREATININE, CALCIUM, MG in the last 48 hours.  CBC:   Recent Labs   Lab 02/12/23  0551 02/13/23  0526   WBC 4.28* 4.07*   HGB 8.4* 9.1*   HCT 25.5* 28.2*    280       Significant Imaging: I have reviewed all pertinent imaging results/findings within the past 24 hours.      Assessment/Plan:      * UGIB (upper gastrointestinal bleed)  Patient  Is poorly verbal. No evidence of recurrent bleeding.  Plan d/c soon. Continue PPI      UTI (urinary tract infection)  Patient is completing a week of Rocephin today.  Will d/c       CLOTILDE (acute kidney injury)  Patient with acute kidney injury likely due to IVVD/dehydration CLOTILDE is currently improving. Labs reviewed- Renal function/electrolytes with Estimated Creatinine Clearance: 36.4 mL/min (A) (based on SCr of 1.33 mg/dL (H)). according to latest data. Monitor urine output and serial BMP and adjust therapy as needed. Avoid nephrotoxins and renally dose meds for GFR listed above.       HTN (hypertension)    Cont procardia      Contracture of joint    Present from admission      Dementia    Cont present mx        VTE Risk Mitigation (From admission, onward)         Ordered     IP VTE HIGH RISK PATIENT  Once         02/05/23 1122     Place sequential compression device  Until discontinued         02/05/23 1122                Discharge Planning   CANDIDA:      Code Status: Prior   Is the patient medically  ready for discharge?:     Reason for patient still in hospital (select all that apply): Treatment                     Quan Silva Jr, MD  Department of Hospital Medicine   [facility]

## 2023-02-14 NOTE — HOSPITAL COURSE
Patient is an 89 y/o AA male NH patient with severe dementia.  He was admitted with coffee ground emesis and suspected UGI bleed.  GI was consulted and he had an EGD done which showed an inflamed distal esophagus with ulceration.  Gastritis was seen and an antral ulcer that was not bleeding.  Biopsies were taken and H.pylori was negative.  He has been treated with IV PPI and bleeding has not recurred.  He has tolerated pureed diet. H/H is stable.  At this point he is ready for return to NH with 2 mos of BID protonix. At that point can change to daily.

## 2023-02-14 NOTE — SUBJECTIVE & OBJECTIVE
Interval History: Non verbal with me.  Nursing present at time of my visit.  No problems noted.     Review of Systems  Objective:     Vital Signs (Most Recent):  Temp: 99 °F (37.2 °C) (02/13/23 1600)  Pulse: 61 (02/13/23 1600)  Resp: 18 (02/13/23 1600)  BP: (!) 133/58 (02/13/23 1600)  SpO2: 98 % (02/13/23 1600)   Vital Signs (24h Range):  Temp:  [98.3 °F (36.8 °C)-99 °F (37.2 °C)] 99 °F (37.2 °C)  Pulse:  [57-61] 61  Resp:  [17-20] 18  SpO2:  [98 %-100 %] 98 %  BP: (127-161)/(58-73) 133/58     Weight: 72.4 kg (159 lb 9.8 oz)  Body mass index is 24.27 kg/m².    Intake/Output Summary (Last 24 hours) at 2/13/2023 1943  Last data filed at 2/12/2023 2027  Gross per 24 hour   Intake 237 ml   Output --   Net 237 ml      Physical Exam  Constitutional:       General: He is not in acute distress.     Appearance: He is not toxic-appearing.   HENT:      Head: Normocephalic.      Mouth/Throat:      Mouth: Mucous membranes are dry.   Cardiovascular:      Rate and Rhythm: Normal rate.   Pulmonary:      Effort: Pulmonary effort is normal. No respiratory distress.   Abdominal:      General: Bowel sounds are normal.      Palpations: Abdomen is soft.   Musculoskeletal:      Right lower leg: No edema.      Left lower leg: No edema.   Skin:     General: Skin is dry.   Neurological:      Mental Status: Mental status is at baseline.       Significant Labs: All pertinent labs within the past 24 hours have been reviewed.  BMP: No results for input(s): GLU, NA, K, CL, CO2, BUN, CREATININE, CALCIUM, MG in the last 48 hours.  CBC:   Recent Labs   Lab 02/12/23  0551 02/13/23  0526   WBC 4.28* 4.07*   HGB 8.4* 9.1*   HCT 25.5* 28.2*    280       Significant Imaging: I have reviewed all pertinent imaging results/findings within the past 24 hours.

## 2023-02-14 NOTE — DISCHARGE SUMMARY
[facility] Hospital Medicine  Discharge Summary      Patient Name: Jt Corrigan  MRN: [id number]  MANJEET: [id number]  Patient Class: IP- Inpatient  Admission Date: 2/5/2023  Hospital Length of Stay: 9 days  Discharge Date and Time: No discharge date for patient encounter.  Attending Physician: Quan Silva Jr., MD   Discharging Provider: Quan Silva Jr, MD  Primary Care Provider: Mónica Cuenca MD    Primary Care Team: Networked reference to record PCT     HPI:   Patient is 89-year-old male with past medical history of hypertension, severe dementia resident at a nursing home, who presented to the emergency room from nursing home for coffee-ground emesis.  Patient is nonverbal, he can not give any history due to his advanced dementia, his daughter provided most of the history at [facility]. Pt was also found to have group b uti for which he was started on rocephen. Pt was seen by GI, and d/t significant change in h/h with his , plan is to get EGD. In the meanwhile pt will continue with his iv abx for uti. Pt will be transferred to [facility] d/t no bed availibility at [facility].        * No surgery found *      Hospital Course:   Patient is an 88 y/o AA male NH patient with severe dementia.  He was admitted with coffee ground emesis and suspected UGI bleed.  GI was consulted and he had an EGD done which showed an inflamed distal esophagus with ulceration.  Gastritis was seen and an antral ulcer that was not bleeding.  Biopsies were taken and H.pylori was negative.  He has been treated with IV PPI and bleeding has not recurred.  He has tolerated pureed diet. H/H is stable.  At this point he is ready for return to NH with 2 mos of BID protonix. At that point can change to daily.        Goals of Care Treatment Preferences:  Code Status: Full Code      Consults:   Consults (From admission, onward)        Status Ordering Provider     Inpatient consult to Gastroenterology  Once         Provider:  JYOTI Reynoso MD    Completed GAYLA, REHMAT U          No new Assessment & Plan notes have been filed under this hospital service since the last note was generated.  Service: Hospital Medicine    Final Active Diagnoses:    Diagnosis Date Noted POA    Dementia [F03.90] 02/04/2023 Yes    Contracture of joint [M24.50] 02/04/2023 Yes    HTN (hypertension) [I10] 02/04/2023 Yes      Problems Resolved During this Admission:    Diagnosis Date Noted Date Resolved POA    PRINCIPAL PROBLEM:  UGIB (upper gastrointestinal bleed) [K92.2] 02/04/2023 02/14/2023 Yes    UTI (urinary tract infection) [N39.0] 02/04/2023 02/14/2023 Yes    CLOTILDE (acute kidney injury) [N17.9] 02/04/2023 02/14/2023 Yes       Discharged Condition: good    Disposition: Skilled Nursing Facility    Follow Up:    Patient Instructions:   No discharge procedures on file.    Significant Diagnostic Studies: Labs: All labs within the past 24 hours have been reviewed    Pending Diagnostic Studies:     None         Medications:  Reconciled Home Medications:      Medication List      START taking these medications    amLODIPine 5 MG tablet  Commonly known as: NORVASC  Take 1 tablet (5 mg total) by mouth once daily.  Start taking on: February 15, 2023     pantoprazole 40 MG tablet  Commonly known as: PROTONIX  Take 1 tablet (40 mg total) by mouth 2 (two) times daily.        CONTINUE taking these medications    acetaminophen 500 MG tablet  Commonly known as: TYLENOL  Take 1,000 mg by mouth every 8 (eight) hours as needed for Pain (elevated temp).     docusate sodium 100 MG capsule  Commonly known as: COLACE  Take 100 mg by mouth 2 (two) times daily.     finasteride 5 mg tablet  Commonly known as: PROSCAR  Take 5 mg by mouth once daily.     melatonin 3 mg tablet  Commonly known as: MELATIN  Take 6 mg by mouth every evening.     multivitamin with minerals tablet  Take 1 tablet by mouth once daily.        STOP taking these medications    aspirin 81 MG  Chew     CALTRATE 600 + D ORAL     meloxicam 15 MG tablet  Commonly known as: MOBIC     NIFEdipine 30 MG (OSM) 24 hr tablet  Commonly known as: PROCARDIA-XL            Indwelling Lines/Drains at time of discharge:   Lines/Drains/Airways     None                 Time spent on the discharge of patient: 35 minutes         Quan Silva Jr, MD  Department of Hospital Medicine  [facility]

## 2023-02-14 NOTE — PLAN OF CARE
Problem: Fall Injury Risk  Goal: Absence of Fall and Fall-Related Injury  Outcome: Ongoing, Progressing     Problem: Fluid and Electrolyte Imbalance (Acute Kidney Injury/Impairment)  Goal: Fluid and Electrolyte Balance  Outcome: Ongoing, Progressing     Problem: Oral Intake Inadequate (Acute Kidney Injury/Impairment)  Goal: Optimal Nutrition Intake  Outcome: Ongoing, Progressing     Problem: Renal Function Impairment (Acute Kidney Injury/Impairment)  Goal: Effective Renal Function  Outcome: Ongoing, Progressing     Problem: Skin Injury Risk Increased  Goal: Skin Health and Integrity  Outcome: Ongoing, Progressing     Problem: Adult Inpatient Plan of Care  Goal: Plan of Care Review  Outcome: Ongoing, Progressing  Goal: Patient-Specific Goal (Individualized)  Outcome: Ongoing, Progressing  Goal: Absence of Hospital-Acquired Illness or Injury  Outcome: Ongoing, Progressing  Goal: Optimal Comfort and Wellbeing  Outcome: Ongoing, Progressing  Goal: Readiness for Transition of Care  Outcome: Ongoing, Progressing     Problem: Airway Clearance Ineffective  Goal: Effective Airway Clearance  Outcome: Ongoing, Progressing

## 2023-03-14 ENCOUNTER — LAB REQUISITION (OUTPATIENT)
Dept: LAB | Facility: HOSPITAL | Age: 88
End: 2023-03-14
Attending: INTERNAL MEDICINE
Payer: MEDICARE

## 2023-03-14 DIAGNOSIS — I10 ESSENTIAL (PRIMARY) HYPERTENSION: ICD-10-CM

## 2023-03-14 LAB
ALBUMIN SERPL BCP-MCNC: 3 G/DL (ref 3.5–5)
ALBUMIN/GLOB SERPL: 0.7 {RATIO}
ALP SERPL-CCNC: 83 U/L (ref 45–115)
ALT SERPL W P-5'-P-CCNC: 20 U/L (ref 16–61)
ANION GAP SERPL CALCULATED.3IONS-SCNC: 12 MMOL/L (ref 7–16)
AST SERPL W P-5'-P-CCNC: 22 U/L (ref 15–37)
BASOPHILS # BLD AUTO: 0.02 K/UL (ref 0–0.2)
BASOPHILS NFR BLD AUTO: 0.5 % (ref 0–1)
BILIRUB SERPL-MCNC: 0.3 MG/DL (ref ?–1.2)
BUN SERPL-MCNC: 20 MG/DL (ref 7–18)
BUN/CREAT SERPL: 15 (ref 6–20)
CALCIUM SERPL-MCNC: 9.1 MG/DL (ref 8.5–10.1)
CHLORIDE SERPL-SCNC: 105 MMOL/L (ref 98–107)
CHOLEST SERPL-MCNC: 149 MG/DL (ref 0–200)
CHOLEST/HDLC SERPL: 2.1 {RATIO}
CO2 SERPL-SCNC: 29 MMOL/L (ref 21–32)
CREAT SERPL-MCNC: 1.32 MG/DL (ref 0.7–1.3)
DIFFERENTIAL METHOD BLD: ABNORMAL
EGFR (NO RACE VARIABLE) (RUSH/TITUS): 52 ML/MIN/1.73M²
EOSINOPHIL # BLD AUTO: 0.34 K/UL (ref 0–0.5)
EOSINOPHIL NFR BLD AUTO: 8.5 % (ref 1–4)
ERYTHROCYTE [DISTWIDTH] IN BLOOD BY AUTOMATED COUNT: 14.9 % (ref 11.5–14.5)
GLOBULIN SER-MCNC: 4.6 G/DL (ref 2–4)
GLUCOSE SERPL-MCNC: 88 MG/DL (ref 74–106)
HCT VFR BLD AUTO: 30.6 % (ref 40–54)
HDLC SERPL-MCNC: 71 MG/DL (ref 40–60)
HGB BLD-MCNC: 10.8 G/DL (ref 13.5–18)
LDLC SERPL CALC-MCNC: 71 MG/DL
LDLC/HDLC SERPL: 1 {RATIO}
LYMPHOCYTES # BLD AUTO: 1.4 K/UL (ref 1–4.8)
LYMPHOCYTES NFR BLD AUTO: 35.1 % (ref 27–41)
MCH RBC QN AUTO: 32.9 PG (ref 27–31)
MCHC RBC AUTO-ENTMCNC: 35.3 G/DL (ref 32–36)
MCV RBC AUTO: 93.3 FL (ref 80–96)
MONOCYTES # BLD AUTO: 0.36 K/UL (ref 0–0.8)
MONOCYTES NFR BLD AUTO: 9 % (ref 2–6)
MPC BLD CALC-MCNC: 9.9 FL (ref 9.4–12.4)
NEUTROPHILS # BLD AUTO: 1.87 K/UL (ref 1.8–7.7)
NEUTROPHILS NFR BLD AUTO: 46.9 % (ref 53–65)
NONHDLC SERPL-MCNC: 78 MG/DL
PLATELET # BLD AUTO: 239 K/UL (ref 150–400)
POTASSIUM SERPL-SCNC: 3.9 MMOL/L (ref 3.5–5.1)
PROT SERPL-MCNC: 7.6 G/DL (ref 6.4–8.2)
RBC # BLD AUTO: 3.28 M/UL (ref 4.6–6.2)
SODIUM SERPL-SCNC: 142 MMOL/L (ref 136–145)
TRIGL SERPL-MCNC: 36 MG/DL (ref 35–150)
VLDLC SERPL-MCNC: 7 MG/DL
WBC # BLD AUTO: 3.99 K/UL (ref 4.5–11)

## 2023-03-14 PROCEDURE — 80061 LIPID PANEL: CPT | Performed by: INTERNAL MEDICINE

## 2023-03-14 PROCEDURE — 85025 COMPLETE CBC W/AUTO DIFF WBC: CPT | Performed by: INTERNAL MEDICINE

## 2023-03-14 PROCEDURE — 80053 COMPREHEN METABOLIC PANEL: CPT | Performed by: INTERNAL MEDICINE

## 2023-05-22 PROBLEM — K25.3 ACUTE GASTRIC ULCER WITHOUT HEMORRHAGE OR PERFORATION: Status: RESOLVED | Noted: 2023-02-06 | Resolved: 2023-01-01

## 2023-07-11 NOTE — NURSING
Pt arrived to floor from ED. No acute distress noted. 20 g INT to R wrist; clean, dry, intact. Pt nonverbal; responds to painful stimuli. Pt does make moaning noises at times. Pt's td upper & lower extremities contracted. Last BM was today 7/11 in ED per OCTAVIO Mcgill. Brief dry at this time. Scab noted to L side of chin. Redness noted to lateral aspect of L foot.

## 2023-07-11 NOTE — NURSING
Nurses Note -- 4 Eyes      7/11/2023       Skin assessed during: Admit      [] No Altered Skin Integrity Present    []Prevention Measures Documented      [x] Yes- Altered Skin Integrity Present or Discovered   [x] LDA Added if Not in Epic (Describe Wound)   [x] New Altered Skin Integrity was Present on Admit and Documented in LDA   [] Wound Image Taken    Wound Care Consulted? No    Attending Nurse:  Dixie Mazariegos RN     Second RN/Staff Member:  DARINEL Lin RN

## 2023-07-11 NOTE — ED PROVIDER NOTES
Encounter Date: 7/11/2023       History     Chief Complaint   Patient presents with    Altered Mental Status     Patient in with altered mental status.  He has not temperature of 98.1°.  He is having low blood pressure.  But he has not been tolerating p.o. liquid.  Or food      Review of patient's allergies indicates:  No Known Allergies  Past Medical History:   Diagnosis Date    Alzheimer's disease, unspecified (CODE)     Hypertension      History reviewed. No pertinent surgical history.  History reviewed. No pertinent family history.  Social History     Tobacco Use    Smoking status: Unknown     Review of Systems   Constitutional:  Positive for fatigue. Negative for fever.   HENT: Negative.  Negative for sore throat.    Eyes: Negative.    Respiratory: Negative.  Negative for shortness of breath.    Cardiovascular: Negative.  Negative for chest pain.   Gastrointestinal: Negative.  Negative for nausea.   Endocrine: Negative.    Genitourinary: Negative.  Negative for dysuria.   Musculoskeletal: Negative.  Negative for back pain.   Skin: Negative.  Negative for rash.   Allergic/Immunologic: Negative.    Neurological: Negative.  Negative for weakness.   Hematological: Negative.  Does not bruise/bleed easily.   Psychiatric/Behavioral: Negative.       Physical Exam     Initial Vitals [07/11/23 0959]   BP Pulse Resp Temp SpO2   (!) 117/53 83 18 98.1 °F (36.7 °C) 99 %      MAP       --         Physical Exam    Constitutional: He appears well-developed and well-nourished.   HENT:   Head: Normocephalic and atraumatic.   Right Ear: External ear normal.   Left Ear: External ear normal.   Nose: Nose normal.   Mouth/Throat: Oropharynx is clear and moist.   Eyes: Conjunctivae and EOM are normal. Pupils are equal, round, and reactive to light.   Neck: Neck supple.   Normal range of motion.  Cardiovascular:  Normal rate, regular rhythm, normal heart sounds and intact distal pulses.           Pulmonary/Chest: Breath sounds normal.    Abdominal: Abdomen is soft. Bowel sounds are normal.   Genitourinary:    Prostate and penis normal.     Musculoskeletal:         General: Normal range of motion.      Cervical back: Normal range of motion and neck supple.     Neurological: He is alert and oriented to person, place, and time. He has normal strength and normal reflexes.   Skin: Skin is warm and dry.   Psychiatric: He has a normal mood and affect. His behavior is normal. Judgment and thought content normal.       Medical Screening Exam   See Full Note    ED Course   Procedures  Labs Reviewed   COMPREHENSIVE METABOLIC PANEL - Abnormal; Notable for the following components:       Result Value    Sodium 158 (*)     Chloride 123 (*)     BUN 38 (*)     Creatinine 1.53 (*)     BUN/Creatinine Ratio 25 (*)     Albumin 2.7 (*)     Globulin 5.1 (*)     eGFR 43 (*)     All other components within normal limits   URINALYSIS - Abnormal; Notable for the following components:    Leukocytes, UA Moderate (*)     Nitrites, UA Positive (*)     Protein, UA Trace (*)     Blood, UA Small (*)     All other components within normal limits   CBC WITH DIFFERENTIAL - Abnormal; Notable for the following components:    RBC 3.97 (*)     Hemoglobin 11.9 (*)     Hematocrit 38.2 (*)     MCV 96.2 (*)     MCHC 31.2 (*)     RDW 18.4 (*)     Neutrophils % 79.6 (*)     Lymphocytes % 13.9 (*)     Lymphocytes, Absolute 0.92 (*)     All other components within normal limits   URINALYSIS, MICROSCOPIC - Abnormal; Notable for the following components:    WBC, UA 11-15 (*)     Bacteria, UA Many (*)     Squamous Epithelial Cells, UA Moderate (*)     All other components within normal limits   LACTIC ACID, PLASMA - Normal   CULTURE, BLOOD   CULTURE, BLOOD   CULTURE, URINE   CBC W/ AUTO DIFFERENTIAL    Narrative:     The following orders were created for panel order CBC Auto Differential.  Procedure                               Abnormality         Status                     ---------                                -----------         ------                     CBC with Differential[[id number]]        Abnormal            Final result                 Please view results for these tests on the individual orders.          Imaging Results              CT Head Without Contrast (Final result)  Result time 07/11/23 12:24:27      Final result by Chetan Santamaria DO (07/11/23 12:24:27)                   Impression:      No acute intracranial findings.    Severe global brain parenchymal volume loss.      Electronically signed by: Chetan Santamaria  Date:    07/11/2023  Time:    12:24               Narrative:    EXAMINATION:  CT HEAD WITHOUT CONTRAST    CLINICAL HISTORY:  Altered mental status, nontraumatic (Ped 0-18y);    TECHNIQUE:  Multiplanar CT imaging from the vertex to skull the skull base was performed without contrast.    COMPARISON:  None    FINDINGS:  Severe global brain parenchymal volume loss.    Mild to moderate small vessel ischemic change.    There is no CT evidence of acute intracranial hemorrhage or large territorial infarct. No epidural/subdural hematomas.    There is no evidence of hydrocephalus, midline shift or mass effect.    Scalp, paranasal sinuses, and mastoid air cells are normal.                                       Medications   levoFLOXacin 500 mg/100 mL IVPB 500 mg (has no administration in time range)   sodium chloride 0.9% bolus 1,000 mL 1,000 mL (0 mLs Intravenous Stopped 7/11/23 1130)     Medical Decision Making:   Initial Assessment:   Status decreased p.o. intake.  Differential Diagnosis:   Will admit for altered mental status will do a CT scan of the head to rule out any other pathology.  Patient with no bruises or any other problems except for hypernatremia.  Blood sodium 158                       Clinical Impression:   Final diagnoses:  [R41.82] Altered mental status, unspecified        ED Disposition Condition    Admit                 Angel Suh DO  07/11/23 [id number]

## 2023-07-11 NOTE — HPI
This is the patient was sent from the nursing home facility.  Patient comes in he is had history of CVAs.  He is very malnutrition malnourished.  He is had urinary tract infections in the past and also has had a recent altered mental status.  He has not been eating or drinking as he should be.  About 1-2 weeks ago he had received a L of normal saline IV to replenish from being dehydrated.  The patient and family wishes that he is a full code.  When it was found today that his blood pressure dropped the doctor the residential facility advised nursing staff seems the ER for further evaluation treatment.  Got the ER the patient was found to have mild urinary tract infection.  And hyper natremia

## 2023-07-11 NOTE — H&P
[facility] - Medical Surgical Unit  Hospital Medicine  History & Physical    Patient Name: Jt Corrigan  MRN: [id number]  Patient Class: IP- Inpatient  Admission Date: 7/11/2023  Attending Physician: Angel Suh DO   Primary Care Provider: Mónica Cuenca MD         Patient information was obtained from patient and ER records.     Subjective:     Principal Problem:<principal problem not specified>    Chief Complaint:   Chief Complaint   Patient presents with    Altered Mental Status        HPI: This is the patient was sent from the nursing home facility.  Patient comes in he is had history of CVAs.  He is very malnutrition malnourished.  He is had urinary tract infections in the past and also has had a recent altered mental status.  He has not been eating or drinking as he should be.  About 1-2 weeks ago he had received a L of normal saline IV to replenish from being dehydrated.  The patient and family wishes that he is a full code.  When it was found today that his blood pressure dropped the doctor the correction facility advised nursing staff seems the ER for further evaluation treatment.  Got the ER the patient was found to have mild urinary tract infection.  And hyper natremia      Past Medical History:   Diagnosis Date    Alzheimer's disease, unspecified (CODE)     Hypertension        History reviewed. No pertinent surgical history.    Review of patient's allergies indicates:  No Known Allergies    No current facility-administered medications on file prior to encounter.     Current Outpatient Medications on File Prior to Encounter   Medication Sig    amLODIPine (NORVASC) 5 MG tablet Take 1 tablet (5 mg total) by mouth once daily.    docusate sodium (COLACE) 100 MG capsule Take 100 mg by mouth 2 (two) times daily.    finasteride (PROSCAR) 5 mg tablet Take 5 mg by mouth once daily.    melatonin (MELATIN) 3 mg tablet Take 6 mg by mouth every evening.    multivitamin with  minerals tablet Take 1 tablet by mouth once daily.    pantoprazole (PROTONIX) 40 MG tablet Take 1 tablet (40 mg total) by mouth 2 (two) times daily.    acetaminophen (TYLENOL) 500 MG tablet Take 1,000 mg by mouth every 8 (eight) hours as needed for Pain (elevated temp).     Family History    None       Tobacco Use    Smoking status: Unknown    Smokeless tobacco: Not on file   Substance and Sexual Activity    Alcohol use: Not on file    Drug use: Not on file    Sexual activity: Not on file     Review of Systems   Constitutional:  Positive for fatigue.   HENT: Negative.     Eyes: Negative.    Respiratory: Negative.     Cardiovascular: Negative.    Gastrointestinal: Negative.    Endocrine: Negative.    Genitourinary: Negative.    Musculoskeletal: Negative.    Skin: Negative.    Allergic/Immunologic: Negative.    Neurological: Negative.         Patient obtunded and with contractures laying on his right side.  He does not walk.  History of dementia Alzheimer's.   Hematological: Negative.    Psychiatric/Behavioral: Negative.     Objective:     Vital Signs (Most Recent):  Temp: 98.2 °F (36.8 °C) (07/11/23 1245)  Pulse: 73 (07/11/23 1245)  Resp: 17 (07/11/23 1245)  BP: (!) 156/76 (07/11/23 1245)  SpO2: 98 % (07/11/23 1245) Vital Signs (24h Range):  Temp:  [98.1 °F (36.7 °C)-98.2 °F (36.8 °C)] 98.2 °F (36.8 °C)  Pulse:  [73-83] 73  Resp:  [17-20] 17  SpO2:  [98 %-99 %] 98 %  BP: (117-156)/(53-76) 156/76     Weight: 73.1 kg (161 lb 1.6 oz)  Body mass index is 23.12 kg/m².     Physical Exam  Constitutional:       Appearance: Normal appearance. He is normal weight.   HENT:      Head: Normocephalic and atraumatic.      Nose: Nose normal.      Mouth/Throat:      Mouth: Mucous membranes are moist.      Pharynx: Oropharynx is clear.   Eyes:      Extraocular Movements: Extraocular movements intact.      Conjunctiva/sclera: Conjunctivae normal.      Pupils: Pupils are equal, round, and reactive to light.   Cardiovascular:       Rate and Rhythm: Normal rate.   Pulmonary:      Effort: Pulmonary effort is normal.      Breath sounds: Normal breath sounds.   Abdominal:      General: Abdomen is flat. Bowel sounds are normal.      Palpations: Abdomen is soft.   Genitourinary:     Penis: Normal.       Testes: Normal.   Musculoskeletal:         General: Normal range of motion.      Cervical back: Normal range of motion and neck supple.      Comments: Patient has not ambulated in some time.  He is confused and in a somnolent obtunded state.  He has muscle contractures and is not ambulatory.   Skin:     General: Skin is warm and dry.      Capillary Refill: Capillary refill takes less than 2 seconds.   Neurological:      General: No focal deficit present.      Mental Status: He is alert and oriented to person, place, and time. Mental status is at baseline.   Psychiatric:         Mood and Affect: Mood normal.         Behavior: Behavior normal.         Thought Content: Thought content normal.         Judgment: Judgment normal.            CRANIAL NERVES     CN III, IV, VI   Pupils are equal, round, and reactive to light.     Significant Labs: All pertinent labs within the past 24 hours have been reviewed.    Significant Imaging: I have reviewed all pertinent imaging results/findings within the past 24 hours.    Assessment/Plan:  Patient 1. With altered mental status 2. Not eating.  3.  Alzheimer's and dementia.  4.  Episodes of hypotension 5. Hypernatremia     No notes have been filed under this hospital service.  Service: Hospital Medicine    VTE Risk Mitigation (From admission, onward)    None                     Angel Suh DO  Department of Hospital Medicine  [facility]

## 2023-07-11 NOTE — ED NOTES
"Called [facility] and spoke to Nurse Teresita, for report. Pt rec'd IVF's last week due to not eating as he normally does, and with "status change". Nurse said pt is normally non-verbal but "didn't open his mouth when attempted to spoon feed pt" this am. Pt responds to voice but wont open his eyes nor open mouth to eat breakfast as he did last week with "mental status change" per karin report. She called physician at center and was going to get urine but states he was having "periods of apnea of 15-20 sec" and was a full code, despite remaining sats above 96% on room air during this time, physician ordered for pt to be transferred to ED and family notified per Teresita @ [facility].  Speech path was "going to see pt today for evaluation" also.  BP reported as 98/60 and initially in ed is 117/53, pt moving and responding to EMS staff with assessment. Daughter @ bs with pt. EMS reports pt normally non-verbal and responds with tactile stimulation, and has dx of advanced dementia.  "

## 2023-07-11 NOTE — SUBJECTIVE & OBJECTIVE
Past Medical History:   Diagnosis Date    Alzheimer's disease, unspecified (CODE)     Hypertension        History reviewed. No pertinent surgical history.    Review of patient's allergies indicates:  No Known Allergies    No current facility-administered medications on file prior to encounter.     Current Outpatient Medications on File Prior to Encounter   Medication Sig    amLODIPine (NORVASC) 5 MG tablet Take 1 tablet (5 mg total) by mouth once daily.    docusate sodium (COLACE) 100 MG capsule Take 100 mg by mouth 2 (two) times daily.    finasteride (PROSCAR) 5 mg tablet Take 5 mg by mouth once daily.    melatonin (MELATIN) 3 mg tablet Take 6 mg by mouth every evening.    multivitamin with minerals tablet Take 1 tablet by mouth once daily.    pantoprazole (PROTONIX) 40 MG tablet Take 1 tablet (40 mg total) by mouth 2 (two) times daily.    acetaminophen (TYLENOL) 500 MG tablet Take 1,000 mg by mouth every 8 (eight) hours as needed for Pain (elevated temp).     Family History    None       Tobacco Use    Smoking status: Unknown    Smokeless tobacco: Not on file   Substance and Sexual Activity    Alcohol use: Not on file    Drug use: Not on file    Sexual activity: Not on file     Review of Systems   Constitutional:  Positive for fatigue.   HENT: Negative.     Eyes: Negative.    Respiratory: Negative.     Cardiovascular: Negative.    Gastrointestinal: Negative.    Endocrine: Negative.    Genitourinary: Negative.    Musculoskeletal: Negative.    Skin: Negative.    Allergic/Immunologic: Negative.    Neurological: Negative.         Patient obtunded and with contractures laying on his right side.  He does not walk.  History of dementia Alzheimer's.   Hematological: Negative.    Psychiatric/Behavioral: Negative.     Objective:     Vital Signs (Most Recent):  Temp: 98.2 °F (36.8 °C) (07/11/23 1245)  Pulse: 73 (07/11/23 1245)  Resp: 17 (07/11/23 1245)  BP: (!) 156/76 (07/11/23 1245)  SpO2: 98 % (07/11/23 1245) Vital Signs  (24h Range):  Temp:  [98.1 °F (36.7 °C)-98.2 °F (36.8 °C)] 98.2 °F (36.8 °C)  Pulse:  [73-83] 73  Resp:  [17-20] 17  SpO2:  [98 %-99 %] 98 %  BP: (117-156)/(53-76) 156/76     Weight: 73.1 kg (161 lb 1.6 oz)  Body mass index is 23.12 kg/m².     Physical Exam  Constitutional:       Appearance: Normal appearance. He is normal weight.   HENT:      Head: Normocephalic and atraumatic.      Nose: Nose normal.      Mouth/Throat:      Mouth: Mucous membranes are moist.      Pharynx: Oropharynx is clear.   Eyes:      Extraocular Movements: Extraocular movements intact.      Conjunctiva/sclera: Conjunctivae normal.      Pupils: Pupils are equal, round, and reactive to light.   Cardiovascular:      Rate and Rhythm: Normal rate.   Pulmonary:      Effort: Pulmonary effort is normal.      Breath sounds: Normal breath sounds.   Abdominal:      General: Abdomen is flat. Bowel sounds are normal.      Palpations: Abdomen is soft.   Genitourinary:     Penis: Normal.       Testes: Normal.   Musculoskeletal:         General: Normal range of motion.      Cervical back: Normal range of motion and neck supple.      Comments: Patient has not ambulated in some time.  He is confused and in a somnolent obtunded state.  He has muscle contractures and is not ambulatory.   Skin:     General: Skin is warm and dry.      Capillary Refill: Capillary refill takes less than 2 seconds.   Neurological:      General: No focal deficit present.      Mental Status: He is alert and oriented to person, place, and time. Mental status is at baseline.   Psychiatric:         Mood and Affect: Mood normal.         Behavior: Behavior normal.         Thought Content: Thought content normal.         Judgment: Judgment normal.            CRANIAL NERVES     CN III, IV, VI   Pupils are equal, round, and reactive to light.     Significant Labs: All pertinent labs within the past 24 hours have been reviewed.    Significant Imaging: I have reviewed all pertinent imaging  results/findings within the past 24 hours.

## 2023-07-11 NOTE — PLAN OF CARE
[facility]  Initial Discharge Assessment       Primary Care Provider: Mónica Cuenca MD    Admission Diagnosis: Altered mental status, unspecified [R41.82]    Admission Date: 7/11/2023  Expected Discharge Date:     Transition of Care Barriers: None    Payor: MEDICARE / Plan: MEDICARE PART A & B / Product Type: Government /     Extended Emergency Contact Information  Primary Emergency Contact: Cary Kenny  Mobile Phone: [phone]  Relation: Daughter    Discharge Plan A: Return to nursing home  Discharge Plan B: Skilled Nursing Facility    No Pharmacies Listed    Initial Assessment (most recent)       Adult Discharge Assessment - 07/11/23 1639          Discharge Assessment    Assessment Type Discharge Planning Assessment     Confirmed/corrected address, phone number and insurance Yes     Confirmed Demographics Correct on Facesheet     Source of Information health record;family     If unable to respond/provide information was family/caregiver contacted? Yes     Contact Name/Number rell Grant [phone]     Communicated CANDIDA with patient/caregiver Date not available/Unable to determine     Reason For Admission Altered mental status, Hypernatremia     People in Home facility resident     Facility Arrived From: [facility]     Do you expect to return to your current living situation? Yes     Do you have help at home or someone to help you manage your care at home? Yes     Who are your caregiver(s) and their phone number(s)? NH staff     Prior to hospitilization cognitive status: Unable to Assess     Current cognitive status: Not Oriented to Time;Not Oriented to Place     Walking or Climbing Stairs ambulation difficulty, requires equipment;ambulation difficulty, assistance 1 person;stair climbing difficulty, dependent;transferring difficulty, requires equipment;transferring difficulty, assistance 1 person     Mobility Management w/c      Dressing/Bathing bathing difficulty, assistance 1 person;dressing difficulty, assistance 1 person     Home Accessibility wheelchair accessible     Home Layout Able to live on 1st floor     Equipment Currently Used at Home wheelchair;hospital bed     Readmission within 30 days? No     Patient currently being followed by outpatient case management? No     Do you currently have service(s) that help you manage your care at home? Yes     Name and Contact number of agency NH staff     Is the pt/caregiver preference to resume services with current agency Yes     Do you take prescription medications? Yes     Do you have prescription coverage? Yes     Coverage Medicare     Do you have any problems affording any of your prescribed medications? No     Is the patient taking medications as prescribed? yes     Who is going to help you get home at discharge? NH staff     How do you get to doctors appointments? other (see comments)   NH facility van    Are you on dialysis? No     Do you take coumadin? No     Discharge Plan A Return to nursing home     Discharge Plan B Skilled Nursing Facility     DME Needed Upon Discharge  none     Discharge Plan discussed with: Adult children     Transition of Care Barriers None        Physical Activity    On average, how many minutes do you engage in exercise at this level? 0 min        Financial Resource Strain    How hard is it for you to pay for the very basics like food, housing, medical care, and heating? Not hard at all        Housing Stability    In the last 12 months, was there a time when you were not able to pay the mortgage or rent on time? No     In the last 12 months, how many places have you lived? 1     In the last 12 months, was there a time when you did not have a steady place to sleep or slept in a shelter (including now)? No        Transportation Needs    In the past 12 months, has lack of transportation kept you from medical appointments or from getting medications? No     In  the past 12 months, has lack of transportation kept you from meetings, work, or from getting things needed for daily living? No        Food Insecurity    Within the past 12 months, you worried that your food would run out before you got the money to buy more. Never true     Within the past 12 months, the food you bought just didn't last and you didn't have money to get more. Never true        Stress    Do you feel stress - tense, restless, nervous, or anxious, or unable to sleep at night because your mind is troubled all the time - these days? Only a little        Social Connections    In a typical week, how many times do you talk on the phone with family, friends, or neighbors? Once a week     How often do you get together with friends or relatives? Once a week     How often do you attend Mandaen or Hinduism services? 1 to 4 times per year     Do you belong to any clubs or organizations such as Mandaen groups, unions, fraternal or athletic groups, or school groups? No     How often do you attend meetings of the clubs or organizations you belong to? Never     Are you , , , , never , or living with a partner?         Alcohol Use    Q1: How often do you have a drink containing alcohol? Never     Q2: How many drinks containing alcohol do you have on a typical day when you are drinking? Patient does not drink                   Pt. Admitted to acute inpatient services for diagnosis of Altered mental status and hypernatremia, and suspected UTI.  Pt. Started on IV fluids and IV Levaquin. Pt. Is a resident of the [facility] and plans are to return back to the NH when medically stable to do so. Pt. Is a Full Code per NH records. Will cont. To follow pt. And assist as needed with d/c plans.

## 2023-07-12 NOTE — PLAN OF CARE
Problem: Adult Inpatient Plan of Care  Goal: Plan of Care Review  Outcome: Unable to Meet, Plan Revised  Goal: Patient-Specific Goal (Individualized)  Outcome: Unable to Meet, Plan Revised  Goal: Absence of Hospital-Acquired Illness or Injury  Outcome: Unable to Meet, Plan Revised  Goal: Optimal Comfort and Wellbeing  Outcome: Unable to Meet, Plan Revised

## 2023-07-12 NOTE — HOSPITAL COURSE
Continue with levaquin for UTI pending culture.  Add lovenox to regimen    07/13/2023 Hospital day 2. Urine culture shows > 100,000 Gram-negative Bacilli, sensitivity not yet available. Will adjust therapy as needed. Na+ down to 156. Will continue D5 1/2 NS at 100 ml/hr and Levaquin pending sensitivity.   7/14: Patient lying in bed, sleeping and in NAD.  spoke with family and they are requesting PEG placement. Referral pending with [facility]. Family also maintaining their wished for full code status. Abx changed to Rocephin yesterday based on sensitivity report. Labs reviewed and notable as follows: Na 147, Potassium 3.2. Case discussed with Dr Booth via telemedicine consultation. Will add potassium supplement. Will change to swingbed status today for IV abx and pending PEG placement before returning to nursing home.

## 2023-07-12 NOTE — PT/OT/SLP EVAL
Speech Language Pathology Evaluation  Bedside Swallow    Patient Name:  Jt Corrigan   MRN:  [id number]  Admitting Diagnosis: <principal problem not specified>    Recommendations:                 General Recommendations:  GI evaluation and Dysphagia therapy  Diet recommendations:  Puree, Thin  only if alert  Aspiration Precautions: Feed only when awake/alert   General Precautions: Standard, aspiration, pureed diet  Communication strategies:  provide increased time to answer and go to room if call light pushed    Assessment:     Jt Corrigan is a 89 y.o. male with an SLP diagnosis of Dysphagia.  He presents with difficulty in tolerating puree diet, thin liquids for adequate nutrition and nhydration. Recent decline in function with refusal/minimal PO intake consistently, frequent dehydration, UTI. .    Results of eval educated to family with reassessment in AM. Family indicated wishes for placement of peg tube due to recent episodes of continuous dehydration/lack of PO intake.     History:     Past Medical History:   Diagnosis Date    Alzheimer's disease, unspecified (CODE)     Hypertension        History reviewed. No pertinent surgical history.    Social History: Patient lives at [facility].    Prior Intubation HX:  na    Modified Barium Swallow: na    Chest X-Rays: no recent xray on chart for review    Prior diet: puree thin liquids.    Occupation/hobbies/homemaking: none stated .    Subjective     Pt required verbal instructions, sternal rub with family present.   Patient goals: none stated, family wishes for peg tube placement     Pain/Comfort:       Respiratory Status: Room air    Objective:     Oral Musculature Evaluation  Oral Musculature: general weakness  Dentition: edentulous  Secretion Management: adequate  Mucosal Quality: dry  Mandibular Strength and Mobility: impaired  Oral Labial Strength and Mobility: impaired coordination  Lingual Strength and Mobility: impaired strength  Volitional Cough:  delayed  Volitional Swallow: delayed  Voice Prior to PO Intake: clear    Bedside Swallow Eval:   Consistencies Assessed:  Thin liquids x3  Puree x2      Oral Phase:   Decreased closure around utensil  Prolonged mastication  Lingual residue  Slow oral transit time    Pharyngeal Phase:   Delayed cough at times  delayed swallow initation    Compensatory Strategies  None    Treatment: reassess safety of PO intake in AM    Goals:   Pt will tolerate least restrictive PO diet of puree, thin liquids with no overt s/sx aspiration.       Plan:     Patient to be seen:  2 x/week   Plan of Care expires:  07/14/23  Plan of Care reviewed with:  patient, family   SLP Follow-Up:  Yes       Discharge recommendations:  nursing facility, skilled   Barriers to Discharge:   decreased PO intake, coughing at times    Time Tracking:     SLP Treatment Date:      Speech Start Time:  (P) 1042  Speech Stop Time:  (P) 1101     Speech Total Time (min):  (P) 19 min    Billable Minutes: Eval Swallow and Oral Function 19 07/12/2023

## 2023-07-12 NOTE — SUBJECTIVE & OBJECTIVE
Interval History: continue abx and IV fluids    Review of Systems   Constitutional:  Positive for activity change and appetite change.   Respiratory:  Negative for cough and shortness of breath.    Gastrointestinal:  Negative for abdominal pain.   Genitourinary:  Positive for decreased urine volume.   Skin: Negative.    Neurological: Negative.    Objective:     Vital Signs (Most Recent):  Temp: 98.8 °F (37.1 °C) (07/12/23 0418)  Pulse: 69 (07/12/23 0418)  Resp: 16 (07/12/23 0418)  BP: (!) 96/50 (07/12/23 0419)  SpO2: 100 % (07/12/23 0418) Vital Signs (24h Range):  Temp:  [98.1 °F (36.7 °C)-98.8 °F (37.1 °C)] 98.8 °F (37.1 °C)  Pulse:  [64-83] 69  Resp:  [16-20] 16  SpO2:  [97 %-100 %] 100 %  BP: ()/(44-91) 96/50     Weight: 73.1 kg (161 lb 1.6 oz)  Body mass index is 23.12 kg/m².    Intake/Output Summary (Last 24 hours) at 7/12/2023 0740  Last data filed at 7/12/2023 0415  Gross per 24 hour   Intake 2189 ml   Output --   Net 2189 ml         Physical Exam  HENT:      Mouth/Throat:      Mouth: Mucous membranes are dry.   Cardiovascular:      Rate and Rhythm: Normal rate and regular rhythm.      Heart sounds: Murmur heard.   Abdominal:      General: Abdomen is flat. Bowel sounds are normal.      Palpations: Abdomen is soft.   Musculoskeletal:      Cervical back: Neck supple.      Comments: Contractures to extremities   Skin:     General: Skin is warm and dry.   Neurological:      Mental Status: He is alert. Mental status is at baseline.           Significant Labs: All pertinent labs within the past 24 hours have been reviewed.    Significant Imaging: I have reviewed all pertinent imaging results/findings within the past 24 hours.

## 2023-07-12 NOTE — PLAN OF CARE
Pt. Will cont. With IV fluids and IV Levaquin for Acute UTI. Preliminary culture shows GM- bacilli. ST will visit pt. Today for an evaluation also. Will cont. To follow.

## 2023-07-12 NOTE — PROGRESS NOTES
[facility] - [facility] Surgical Unit  Hospital Medicine  Progress Note    Patient Name: Jt Corrigan  MRN: [id number]  Patient Class: IP- Inpatient   Admission Date: 7/11/2023  Length of Stay: 1 days  Attending Physician: Angel Suh DO  Primary Care Provider: Mónica Cuenca MD        Subjective:     Principal Problem:<principal problem not specified>        HPI:  This is the patient was sent from the nursing home facility.  Patient comes in he is had history of CVAs.  He is very malnutrition malnourished.  He is had urinary tract infections in the past and also has had a recent altered mental status.  He has not been eating or drinking as he should be.  About 1-2 weeks ago he had received a L of normal saline IV to replenish from being dehydrated.  The patient and family wishes that he is a full code.  When it was found today that his blood pressure dropped the doctor the skilled nursing facility advised nursing staff seems the ER for further evaluation treatment.  Got the ER the patient was found to have mild urinary tract infection.  And hyper natremia      Overview/Hospital Course:  Continue with levaquin for UTI pending culture.  Add lovenox to regimen      Interval History: continue abx and IV fluids    Review of Systems   Constitutional:  Positive for activity change and appetite change.   Respiratory:  Negative for cough and shortness of breath.    Gastrointestinal:  Negative for abdominal pain.   Genitourinary:  Positive for decreased urine volume.   Skin: Negative.    Neurological: Negative.    Objective:     Vital Signs (Most Recent):  Temp: 98.8 °F (37.1 °C) (07/12/23 0418)  Pulse: 69 (07/12/23 0418)  Resp: 16 (07/12/23 0418)  BP: (!) 96/50 (07/12/23 0419)  SpO2: 100 % (07/12/23 0418) Vital Signs (24h Range):  Temp:  [98.1 °F (36.7 °C)-98.8 °F (37.1 °C)] 98.8 °F (37.1 °C)  Pulse:  [64-83] 69  Resp:  [16-20] 16  SpO2:  [97 %-100 %] 100 %  BP: ()/(44-91) 96/50     Weight:  73.1 kg (161 lb 1.6 oz)  Body mass index is 23.12 kg/m².    Intake/Output Summary (Last 24 hours) at 7/12/2023 0740  Last data filed at 7/12/2023 0415  Gross per 24 hour   Intake 2189 ml   Output --   Net 2189 ml         Physical Exam  HENT:      Mouth/Throat:      Mouth: Mucous membranes are dry.   Cardiovascular:      Rate and Rhythm: Normal rate and regular rhythm.      Heart sounds: Murmur heard.   Abdominal:      General: Abdomen is flat. Bowel sounds are normal.      Palpations: Abdomen is soft.   Musculoskeletal:      Cervical back: Neck supple.      Comments: Contractures to extremities   Skin:     General: Skin is warm and dry.   Neurological:      Mental Status: He is alert. Mental status is at baseline.           Significant Labs: All pertinent labs within the past 24 hours have been reviewed.    Significant Imaging: I have reviewed all pertinent imaging results/findings within the past 24 hours.    Assessment/Plan:      No notes have been filed under this hospital service.  Service: Hospital Medicine    VTE Risk Mitigation (From admission, onward)           Ordered     enoxaparin injection 40 mg  Every 24 hours         07/12/23 0906                    Discharge Planning   CANDIDA:      Code Status: Full Code   Is the patient medically ready for discharge?:     Reason for patient still in hospital (select all that apply): Patient trending condition  Discharge Plan A: Return to nursing home                  DIANNE Galan  Department of Hospital Medicine   [facility]

## 2023-07-13 PROBLEM — N30.01 ACUTE CYSTITIS WITH HEMATURIA: Status: ACTIVE | Noted: 2023-01-01

## 2023-07-13 PROBLEM — E87.0 HYPERNATREMIA: Status: ACTIVE | Noted: 2023-01-01

## 2023-07-13 NOTE — PT/OT/SLP PROGRESS
Speech Language Pathology Treatment    Patient Name:  Jt Corrigan   MRN:  [id number]  Admitting Diagnosis: Acute cystitis with hematuria    Recommendations:                 General Recommendations:  Follow-up not indicated and consider Hospice and/or alternate means of nutrition  Diet recommendations:  Puree, Liquid Diet Level: Thin   Aspiration Precautions: Feed only when awake/alert, Monitor for s/s of aspiration, Small bites/sips, and Strict aspiration precautions   General Precautions: Standard, aspiration, pureed diet  Communication strategies:  provide increased time to answer and go to room if call light pushed    Assessment:     Jt Corrigan is a 89 y.o. male with an SLP diagnosis of Dysphagia.  He presents with inability to consistently intake adequate PO at this time due to dehydration,change in status, UTI.     Pt with least restrictive PO diet of puree, thin liquids at this time. Only feed if fully alert and 1-2 second response time noted. High aspiration risk with ST recommendation of Hospice and/or alternate means of nutrition for adequate nutrition and hydration.     Subjective     Pt responsive to ST via eye opening.   Patient goals: none stated      Pain/Comfort:       Respiratory Status: Room air    Objective:     Has the patient been evaluated by SLP for swallowing?   Yes  Keep patient NPO? No   Current Respiratory Status:        Pt with inability to follow commands of increased level. Pt followed for basic open, close, swallow, sip.  Trials thin liquids via cup x3, double swallow.  Trials thin liquids via straw x2, throat clear x1 with eyes watering.    Trials puree x3 via spoon with no overt s/sx aspiration.     Delayed cough noted upon ST exit of room. ST spoke with provider regarding updated chest xray to assess potential silent aspiration.    ST also informed provider and  of family indicating wishes of placement of peg tube on initial evaluation    Goals:   Pt will tolerate least  restrictive diet with no overt s/sx aspiration.       Plan:     Patient to be seen:  2 x/week  DC this date with recommendation of least restrictive diet as tolerated, Hospice and/or alternate means of nutrition  Plan of Care expires:  07/14/23  Plan of Care reviewed with:  patient, family   SLP Follow-Up:  Yes       Discharge recommendations:  nursing facility, skilled   Barriers to Discharge:   aspiration risk     Time Tracking:     SLP Treatment Date:   07/13/23  Speech Start Time:  0940  Speech Stop Time:  1000     Speech Total Time (min):  20 min    Billable Minutes: Treatment Swallowing Dysfunction 20 07/13/2023

## 2023-07-13 NOTE — ASSESSMENT & PLAN NOTE
Na+ down to 156 from 158  -Continue D5 1/2 NS at 100 ml/hr  -Increase free fluids  -re-check labs in AM

## 2023-07-13 NOTE — SUBJECTIVE & OBJECTIVE
Interval History: Progress    Review of Systems   Unable to perform ROS: Patient nonverbal   Constitutional:  Positive for activity change (Decreased activity per Nsg home. Patient is bedbound and nonverbal).   HENT: Negative.     Eyes: Negative.    Respiratory: Negative.     Cardiovascular: Negative.    Gastrointestinal: Negative.    Endocrine: Negative.    Genitourinary: Negative.    Musculoskeletal: Negative.    Skin: Negative.    Allergic/Immunologic: Negative.    Neurological: Negative.    Hematological: Negative.    Psychiatric/Behavioral: Negative.     Objective:     Vital Signs (Most Recent):  Temp: 98 °F (36.7 °C) (07/13/23 0400)  Pulse: 60 (07/13/23 0400)  Resp: 19 (07/13/23 0400)  BP: 134/82 (07/13/23 0400)  SpO2: 99 % (07/13/23 0400) Vital Signs (24h Range):  Temp:  [97.6 °F (36.4 °C)-98.5 °F (36.9 °C)] 98 °F (36.7 °C)  Pulse:  [60-77] 60  Resp:  [18-19] 19  SpO2:  [94 %-99 %] 99 %  BP: (120-134)/(41-82) 134/82     Weight: 73 kg (161 lb)  Body mass index is 23.1 kg/m².  No intake or output data in the 24 hours ending 07/13/23 0657      Physical Exam  Vitals and nursing note reviewed.   Constitutional:       General: He is not in acute distress.     Appearance: Normal appearance. He is normal weight. He is not ill-appearing, toxic-appearing or diaphoretic.   HENT:      Head: Normocephalic.      Right Ear: External ear normal.      Left Ear: External ear normal.      Nose: Nose normal.      Mouth/Throat:      Mouth: Mucous membranes are moist.      Pharynx: Oropharynx is clear.   Eyes:      Conjunctiva/sclera: Conjunctivae normal.   Cardiovascular:      Rate and Rhythm: Normal rate and regular rhythm.      Pulses: Normal pulses.   Pulmonary:      Effort: Pulmonary effort is normal.      Breath sounds: Normal breath sounds.   Abdominal:      General: Abdomen is flat. Bowel sounds are normal.      Palpations: Abdomen is soft.   Musculoskeletal:         General: Normal range of motion.      Cervical back:  Normal range of motion.   Skin:     General: Skin is warm and dry.      Capillary Refill: Capillary refill takes less than 2 seconds.   Neurological:      Mental Status: He is alert. Mental status is at baseline.   Psychiatric:      Comments: Patient is bed bound and non-verbal which is at his baseline.            Significant Labs: All pertinent labs within the past 24 hours have been reviewed.  BMP:   Recent Labs   Lab 07/12/23  0742   *   *   K 3.6   *   CO2 28   BUN 32*   CREATININE 1.34*   CALCIUM 9.1     CBC:   Recent Labs   Lab 07/11/23  1024 07/12/23  0742   WBC 6.64 5.15   HGB 11.9* 10.8*   HCT 38.2* 34.9*    222     Urine Culture:   Recent Labs   Lab 07/11/23  1036   LABURIN >100,000 Gram-negative Bacilli*       Significant Imaging: I have reviewed all pertinent imaging results/findings within the past 24 hours.

## 2023-07-13 NOTE — ASSESSMENT & PLAN NOTE
Urine culture noted to have > 100,000 Gram-negative Bacilli, awaiting sensitivity  -Continue Levaquin 500 mg IVPB Q 24 hours

## 2023-07-13 NOTE — PLAN OF CARE
Problem: Adult Inpatient Plan of Care  Goal: Plan of Care Review  Outcome: Ongoing, Not Progressing  Goal: Patient-Specific Goal (Individualized)  Outcome: Ongoing, Not Progressing  Goal: Absence of Hospital-Acquired Illness or Injury  Outcome: Ongoing, Not Progressing  Goal: Optimal Comfort and Wellbeing  Outcome: Ongoing, Not Progressing  Goal: Readiness for Transition of Care  Outcome: Ongoing, Not Progressing     Problem: Impaired Wound Healing  Goal: Optimal Wound Healing  Outcome: Ongoing, Not Progressing     Problem: Fall Injury Risk  Goal: Absence of Fall and Fall-Related Injury  Outcome: Ongoing, Not Progressing     Problem: Skin Injury Risk Increased  Goal: Skin Health and Integrity  Outcome: Ongoing, Not Progressing     Problem: Infection  Goal: Absence of Infection Signs and Symptoms  Outcome: Ongoing, Not Progressing

## 2023-07-13 NOTE — NURSING
Nurses Note -- 4 Eyes      7/13/2023   07:22AM      Skin assessed during: Daily Assessment      [x] No Altered Skin Integrity Present    []Prevention Measures Documented      [] Yes- Altered Skin Integrity Present or Discovered   [] LDA Added if Not in Epic (Describe Wound)   [] New Altered Skin Integrity was Present on Admit and Documented in LDA   [] Wound Image Taken    Wound Care Consulted? No    Attending Nurse:  Zahida Bain LPN     Second RN/Staff Member:  OCTAVIO ECHEVERRIA    @ 07:22AM    RESTING IN BED WITH EYES CLOSED. RESPIRATIONS EVEN AND NON LABORED. NO DISTRESS NOTED. PT IS NON VERBAL AND ALSO CONTRACTED. IV FLUIDS REMAIN CONTINOUS. SAFETY MEASUERS IN PLACE. COMMUNICATION BOARD UDPATED. OFF GOING NURSE OCTAVIO ECHEVERRIA GAVE ME REPORT ON PT. OCTAVIO MOLINA WILL RESOURCE ME WITH THIS PT.

## 2023-07-13 NOTE — PROGRESS NOTES
[facility] - [facility]  Hospital Medicine  Progress Note    Patient Name: Jt Corrigan  MRN: [id number]  Patient Class: IP- Inpatient   Admission Date: 7/11/2023  Length of Stay: 2 days  Attending Physician: Angel Suh DO  Primary Care Provider: Mónica Cuenca MD        Subjective:     Principal Problem:Acute cystitis with hematuria        HPI:  This is the patient was sent from the nursing home facility.  Patient comes in he is had history of CVAs.  He is very malnutrition malnourished.  He is had urinary tract infections in the past and also has had a recent altered mental status.  He has not been eating or drinking as he should be.  About 1-2 weeks ago he had received a L of normal saline IV to replenish from being dehydrated.  The patient and family wishes that he is a full code.  When it was found today that his blood pressure dropped the doctor the [facility] facility advised nursing staff seems the ER for further evaluation treatment.  Got the ER the patient was found to have mild urinary tract infection.  And hyper natremia      Overview/Hospital Course:  Continue with levaquin for UTI pending culture.  Add lovenox to regimen    07/13/2023 Hospital day 2. Urine culture shows > 100,000 Gram-negative Bacilli, sensitivity not yet available. Will adjust therapy as needed. Na+ down to 156. Will continue D5 1/2 NS at 100 ml/hr and Levaquin pending sensitivity.       Interval History: Progress    Review of Systems   Unable to perform ROS: Patient nonverbal   Constitutional:  Positive for activity change (Decreased activity per Nsg home. Patient is bedbound and nonverbal).   HENT: Negative.     Eyes: Negative.    Respiratory: Negative.     Cardiovascular: Negative.    Gastrointestinal: Negative.    Endocrine: Negative.    Genitourinary: Negative.    Musculoskeletal: Negative.    Skin: Negative.    Allergic/Immunologic: Negative.    Neurological: Negative.     Hematological: Negative.    Psychiatric/Behavioral: Negative.     Objective:     Vital Signs (Most Recent):  Temp: 98 °F (36.7 °C) (07/13/23 0400)  Pulse: 60 (07/13/23 0400)  Resp: 19 (07/13/23 0400)  BP: 134/82 (07/13/23 0400)  SpO2: 99 % (07/13/23 0400) Vital Signs (24h Range):  Temp:  [97.6 °F (36.4 °C)-98.5 °F (36.9 °C)] 98 °F (36.7 °C)  Pulse:  [60-77] 60  Resp:  [18-19] 19  SpO2:  [94 %-99 %] 99 %  BP: (120-134)/(41-82) 134/82     Weight: 73 kg (161 lb)  Body mass index is 23.1 kg/m².  No intake or output data in the 24 hours ending 07/13/23 0657      Physical Exam  Vitals and nursing note reviewed.   Constitutional:       General: He is not in acute distress.     Appearance: Normal appearance. He is normal weight. He is not ill-appearing, toxic-appearing or diaphoretic.   HENT:      Head: Normocephalic.      Right Ear: External ear normal.      Left Ear: External ear normal.      Nose: Nose normal.      Mouth/Throat:      Mouth: Mucous membranes are moist.      Pharynx: Oropharynx is clear.   Eyes:      Conjunctiva/sclera: Conjunctivae normal.   Cardiovascular:      Rate and Rhythm: Normal rate and regular rhythm.      Pulses: Normal pulses.   Pulmonary:      Effort: Pulmonary effort is normal.      Breath sounds: Normal breath sounds.   Abdominal:      General: Abdomen is flat. Bowel sounds are normal.      Palpations: Abdomen is soft.   Musculoskeletal:         General: Normal range of motion.      Cervical back: Normal range of motion.   Skin:     General: Skin is warm and dry.      Capillary Refill: Capillary refill takes less than 2 seconds.   Neurological:      Mental Status: He is alert. Mental status is at baseline.   Psychiatric:      Comments: Patient is bed bound and non-verbal which is at his baseline.            Significant Labs: All pertinent labs within the past 24 hours have been reviewed.  BMP:   Recent Labs   Lab 07/12/23  0742   *   *   K 3.6   *   CO2 28   BUN 32*    CREATININE 1.34*   CALCIUM 9.1     CBC:   Recent Labs   Lab 07/11/23  1024 07/12/23  0742   WBC 6.64 5.15   HGB 11.9* 10.8*   HCT 38.2* 34.9*    222     Urine Culture:   Recent Labs   Lab 07/11/23  1036   LABURIN >100,000 Gram-negative Bacilli*       Significant Imaging: I have reviewed all pertinent imaging results/findings within the past 24 hours.      Assessment/Plan:      * Acute cystitis with hematuria  Urine culture noted to have > 100,000 Gram-negative Bacilli, awaiting sensitivity  -Continue Levaquin 500 mg IVPB Q 24 hours    Hypernatremia  Na+ down to 156 from 158  -Continue D5 1/2 NS at 100 ml/hr  -Increase free fluids  -re-check labs in AM      HTN (hypertension)  Monitor BP  Continue Amlodipine 5 mg PO daily      Dementia          VTE Risk Mitigation (From admission, onward)           Ordered     enoxaparin injection 40 mg  Every 24 hours         07/12/23 0906                    Discharge Planning   CANDIDA:      Code Status: Full Code   Is the patient medically ready for discharge?:     Reason for patient still in hospital (select all that apply): Patient trending condition, Laboratory test and Treatment  Discharge Plan A: Return to nursing home        Patients history, presentation and current POC were discussed with Dr. Suh who is the hospitalist on-call for [facility] at this time.    Patients urine culture is pansensitive to multiple agents. Spoke with the pharmacist on-call at [facility] who agrees that Rocephin 1 gm IV Q 24 hours would be a better treatment option for this patient r/t his culture results.         Bg Cleary NP-C  Department of Hospital Medicine   [facility]

## 2023-07-13 NOTE — NURSING
@ 17:30PM RESTING IN BED WITH EYES OPEN. NO DISTRESS NOTED. RESPIRAITONS EVEN AND NON LABORED. NO FACIAL GRIMACING PRESENT. IV FLUIDS REMAIN CONTINOUS. NO NEEDS VISUALIZED. I WILL REPORT OFF TO ONCOMING SHIFT.

## 2023-07-13 NOTE — PLAN OF CARE
Pt. Has been seen by ST with recommendation  for Puree diet and to feed pt. Pt. Will cont. IV Levaquin for UTI and IV fluids. Pt. Family plans for pt. To return back to NH when medically stable to do so. Will cont. To follow pt.

## 2023-07-14 PROBLEM — E46 MALNUTRITION: Status: ACTIVE | Noted: 2023-01-01

## 2023-07-14 PROBLEM — R53.1 GENERALIZED WEAKNESS: Status: ACTIVE | Noted: 2023-01-01

## 2023-07-14 NOTE — ASSESSMENT & PLAN NOTE
Urine culture noted to have > 100,000 Gram-negative Bacilli, awaiting sensitivity  7/14: Changed to Rocephin 1 gm IVPB daily based on sensitivity report

## 2023-07-14 NOTE — PT/OT/SLP DISCHARGE
Skilled OT eval order received and reviewed however  the pt is not a candidate for skilled OT due to prior level of function of being dependent. OT and PT discussed with provider who will remove the therapy eval consult.

## 2023-07-14 NOTE — PLAN OF CARE
[facility]  Initial Discharge Assessment       Primary Care Provider: Mónica Cuenca MD    Admission Diagnosis: Acute UTI [N39.0]  AMS (altered mental status) [R41.82]  Hypernatremia [E87.0]  Protein-calorie malnutrition, moderate [E44.0]  Poor appetite [R63.0]  Dementia [F03.90]  HTN (hypertension) [I10]  Dysphagia [R13.10]  Debility [R53.81]  Urinary tract infection [N39.0]    Admission Date: 7/14/2023  Expected Discharge Date:     Transition of Care Barriers: None    Payor: MEDICARE / Plan: MEDICARE PART A & B / Product Type: Government /     Extended Emergency Contact Information  Primary Emergency Contact: Cary Kenny  Mobile Phone: [phone]  Relation: Daughter    Discharge Plan A: Return to nursing home  Discharge Plan B: Skilled Nursing Facility    No Pharmacies Listed    Initial Assessment (most recent)       Adult Discharge Assessment - 07/14/23 1408          Discharge Assessment    Assessment Type Discharge Planning Assessment     Confirmed/corrected address, phone number and insurance Yes     Confirmed Demographics Correct on Facesheet     Source of Information family     If unable to respond/provide information was family/caregiver contacted? Yes     Contact Name/Number Cary Kenny, daughter [phone]     Communicated CANDIDA with patient/caregiver Date not available/Unable to determine     Reason For Admission AMS, Acute UTI, Hypernatremia, Malnutrition, poor appetite, debility, Dementia     People in Home facility resident     Facility Arrived From: [facility]     Do you expect to return to your current living situation? Yes     Do you have help at home or someone to help you manage your care at home? Yes     Who are your caregiver(s) and their phone number(s)? NH staff     Prior to hospitilization cognitive status: Unable to Assess     Current cognitive status: Not Oriented to Person;Not Oriented to Place;Not Oriented to Time     Mobility  Management w/c     Dressing/Bathing bathing difficulty, assistance 1 person;dressing difficulty, requires equipment     Home Accessibility wheelchair accessible     Equipment Currently Used at Home wheelchair;hospital bed     Readmission within 30 days? No     Patient currently being followed by outpatient case management? No     Do you currently have service(s) that help you manage your care at home? Yes     Name and Contact number of agency NH staff     Is the pt/caregiver preference to resume services with current agency Yes     Do you take prescription medications? Yes     Do you have prescription coverage? Yes     Coverage Medicare     Do you have any problems affording any of your prescribed medications? No     Is the patient taking medications as prescribed? yes     Who is going to help you get home at discharge? NH staff     How do you get to doctors appointments? other (see comments)   NH facility van    Are you on dialysis? No     Do you take coumadin? No     Discharge Plan A Return to nursing home     Discharge Plan B Skilled Nursing Facility     DME Needed Upon Discharge  none     Discharge Plan discussed with: Adult children     Transition of Care Barriers None        Physical Activity    On average, how many days per week do you engage in moderate to strenuous exercise (like a brisk walk)? 0 days     On average, how many minutes do you engage in exercise at this level? 0 min        Financial Resource Strain    How hard is it for you to pay for the very basics like food, housing, medical care, and heating? Not hard at all        Housing Stability    In the last 12 months, was there a time when you were not able to pay the mortgage or rent on time? No     In the last 12 months, how many places have you lived? 1     In the last 12 months, was there a time when you did not have a steady place to sleep or slept in a shelter (including now)? No        Transportation Needs    In the past 12 months, has lack  of transportation kept you from medical appointments or from getting medications? No     In the past 12 months, has lack of transportation kept you from meetings, work, or from getting things needed for daily living? No        Food Insecurity    Within the past 12 months, you worried that your food would run out before you got the money to buy more. Never true     Within the past 12 months, the food you bought just didn't last and you didn't have money to get more. Never true        Stress    Do you feel stress - tense, restless, nervous, or anxious, or unable to sleep at night because your mind is troubled all the time - these days? Not at all        Social Connections    In a typical week, how many times do you talk on the phone with family, friends, or neighbors? Once a week     How often do you get together with friends or relatives? Once a week     How often do you attend Baptist or Confucianism services? Never     Do you belong to any clubs or organizations such as Baptist groups, unions, fraternal or athletic groups, or school groups? No     How often do you attend meetings of the clubs or organizations you belong to? Never     Are you , , , , never , or living with a partner?         Alcohol Use    Q1: How often do you have a drink containing alcohol? Never     Q2: How many drinks containing alcohol do you have on a typical day when you are drinking? Patient does not drink     Q3: How often do you have six or more drinks on one occasion? Never                 Pt. Admitted to swing bed services for continued IV antibiotics, IV fluids, ST eval. And supportive care. Pt. Had admitted to acute inpatient services with diagnosis of poor oral intake, Hypernatremia, AMS, and acute UTI. Pt. Is a resident of the [facility] and plans are to return back to the NH at d/c from swing bed. Pt. Family requests that pt. Remain a Full Code and daughter, Cary, is wanting  pt.to have a PEG tube placement before he returns back to NH. Will send referral to Dr. Yrok/Dr. Fregoso for review for PEG tube. Will cont. To follow pt. Throughout stay and assist with d/c needs.

## 2023-07-14 NOTE — DISCHARGE SUMMARY
[facility]  Hospital Medicine  Discharge Summary      Patient Name: Jt Corrigan  MRN: [id number]  MANJEET: [id number]  Patient Class: IP- Inpatient  Admission Date: 7/11/2023  Hospital Length of Stay: 3 days  Discharge Date and Time:  07/14/2023 9:56 AM  Attending Physician: Angel Suh DO   Discharging Provider: DIANNE Meyer  Primary Care Provider: Mónica Cuenca MD    Primary Care Team: Networked reference to record PCT     HPI:   This is the patient was sent from the nursing home facility.  Patient comes in he is had history of CVAs.  He is very malnutrition malnourished.  He is had urinary tract infections in the past and also has had a recent altered mental status.  He has not been eating or drinking as he should be.  About 1-2 weeks ago he had received a L of normal saline IV to replenish from being dehydrated.  The patient and family wishes that he is a full code.  When it was found today that his blood pressure dropped the doctor the [facility] facility advised nursing staff seems the ER for further evaluation treatment.  Got the ER the patient was found to have mild urinary tract infection.  And hyper natremia      * No surgery found *      Hospital Course:   Continue with levaquin for UTI pending culture.  Add lovenox to regimen    07/13/2023 Hospital day 2. Urine culture shows > 100,000 Gram-negative Bacilli, sensitivity not yet available. Will adjust therapy as needed. Na+ down to 156. Will continue D5 1/2 NS at 100 ml/hr and Levaquin pending sensitivity.   7/14: Patient lying in bed, sleeping and in NAD.  spoke with family and they are requesting PEG placement. Referral pending with [facility]. Family also maintaining their wished for full code status. Abx changed to Rocephin yesterday based on sensitivity report. Labs reviewed and notable as follows: Na 147, Potassium 3.2. Case discussed with Dr Booth via telemedicine  consultation. Will add potassium supplement. Will change to swingbed status today for IV abx and pending PEG placement before returning to nursing home.         Goals of Care Treatment Preferences:  Code Status: Full Code      Consults:     Cardiac/Vascular  HTN (hypertension)  Monitor BP  Continue Amlodipine 5 mg PO daily      Renal/  * Acute cystitis with hematuria  Urine culture noted to have > 100,000 Gram-negative Bacilli, awaiting sensitivity  7/14: Changed to Rocephin 1 gm IVPB daily based on sensitivity report    Final Active Diagnoses:    Diagnosis Date Noted POA    PRINCIPAL PROBLEM:  Acute cystitis with hematuria [N30.01] 07/13/2023 Yes    Hypernatremia [E87.0] 07/13/2023 Yes    HTN (hypertension) [I10] 02/04/2023 Yes      Problems Resolved During this Admission:       Discharged Condition: stable    Disposition: Skilled Nursing Facility    Follow Up:    Patient Instructions:   No discharge procedures on file.    Significant Diagnostic Studies: Labs:   BMP:   Recent Labs   Lab 07/14/23  0550   GLU 98   *   K 3.2*   *   CO2 24   BUN 16   CREATININE 1.16   CALCIUM 8.9   , CMP   Recent Labs   Lab 07/14/23  0550   *   K 3.2*   *   CO2 24   GLU 98   BUN 16   CREATININE 1.16   CALCIUM 8.9   ANIONGAP 15    and All labs within the past 24 hours have been reviewed  Microbiology:   Urine Culture    Lab Results   Component Value Date    LABURIN >100,000 Escherichia coli (A) 07/11/2023       Pending Diagnostic Studies:     None         Medications:  Reconciled Home Medications:      Medication List      ASK your doctor about these medications    acetaminophen 500 MG tablet  Commonly known as: TYLENOL  Take 1,000 mg by mouth every 8 (eight) hours as needed for Pain (elevated temp).     amLODIPine 5 MG tablet  Commonly known as: NORVASC  Take 1 tablet (5 mg total) by mouth once daily.     docusate sodium 100 MG capsule  Commonly known as: COLACE  Take 100 mg by mouth 2 (two) times daily.      finasteride 5 mg tablet  Commonly known as: PROSCAR  Take 5 mg by mouth once daily.     melatonin 3 mg tablet  Commonly known as: MELATIN  Take 6 mg by mouth every evening.     multivitamin with minerals tablet  Take 1 tablet by mouth once daily.     pantoprazole 40 MG tablet  Commonly known as: PROTONIX  Take 1 tablet (40 mg total) by mouth 2 (two) times daily.            Indwelling Lines/Drains at time of discharge:   Lines/Drains/Airways     None                 Time spent on the discharge of patient: 30 minutes         DIANNE Meyer  Department of Hospital Medicine  [facility]

## 2023-07-14 NOTE — SUBJECTIVE & OBJECTIVE
Past Medical History:   Diagnosis Date    Alzheimer's disease, unspecified (CODE)     Hypertension        History reviewed. No pertinent surgical history.    Review of patient's allergies indicates:  No Known Allergies    Current Facility-Administered Medications on File Prior to Encounter   Medication    [COMPLETED] potassium bicarbonate disintegrating tablet 40 mEq    [DISCONTINUED] acetaminophen tablet 1,000 mg    [DISCONTINUED] amLODIPine tablet 5 mg    [DISCONTINUED] cefTRIAXone (ROCEPHIN) 1 g in dextrose 5 % in water (D5W) 5 % 100 mL IVPB (MB+)    [DISCONTINUED] dextrose 5 % and 0.45 % NaCl infusion    [DISCONTINUED] docusate sodium capsule 100 mg    [DISCONTINUED] enoxaparin injection 40 mg    [DISCONTINUED] famotidine tablet 20 mg    [DISCONTINUED] finasteride tablet 5 mg    [DISCONTINUED] melatonin tablet 6 mg    [DISCONTINUED] multivitamin tablet    [DISCONTINUED] pantoprazole EC tablet 40 mg     Current Outpatient Medications on File Prior to Encounter   Medication Sig    amLODIPine (NORVASC) 5 MG tablet Take 1 tablet (5 mg total) by mouth once daily.    docusate sodium (COLACE) 100 MG capsule Take 100 mg by mouth 2 (two) times daily.    finasteride (PROSCAR) 5 mg tablet Take 5 mg by mouth once daily.    melatonin (MELATIN) 3 mg tablet Take 6 mg by mouth every evening.    multivitamin with minerals tablet Take 1 tablet by mouth once daily.    pantoprazole (PROTONIX) 40 MG tablet Take 1 tablet (40 mg total) by mouth 2 (two) times daily.    acetaminophen (TYLENOL) 500 MG tablet Take 1,000 mg by mouth every 8 (eight) hours as needed for Pain (elevated temp).     Family History    None       Tobacco Use    Smoking status: Unknown    Smokeless tobacco: Not on file   Substance and Sexual Activity    Alcohol use: Not on file    Drug use: Not on file    Sexual activity: Not on file     Review of Systems   Unable to perform ROS: Dementia   Constitutional:  Positive for activity change, appetite change and fatigue.    Respiratory:  Negative for cough and shortness of breath.    Gastrointestinal:  Negative for abdominal distention, diarrhea and vomiting.   Skin:  Negative for wound.   Neurological:  Positive for weakness (generalized).   Objective:     Vital Signs (Most Recent):  Temp: 98.6 °F (37 °C) (07/14/23 1120)  Pulse: 64 (07/14/23 1120)  Resp: 15 (07/14/23 1120)  BP: 125/80 (07/14/23 1120)  SpO2: 98 % (07/14/23 1120) Vital Signs (24h Range):  Temp:  [97.9 °F (36.6 °C)-99 °F (37.2 °C)] 98.6 °F (37 °C)  Pulse:  [57-93] 64  Resp:  [15-19] 15  SpO2:  [98 %-100 %] 98 %  BP: (109-161)/() 125/80     Weight: 73 kg (161 lb)  Body mass index is 23.1 kg/m².     Physical Exam  Vitals and nursing note reviewed.   Constitutional:       General: He is sleeping.      Appearance: He is underweight. He is ill-appearing.   HENT:      Head: Normocephalic and atraumatic.      Right Ear: Tympanic membrane normal.      Left Ear: Tympanic membrane normal.      Nose: Nose normal.      Mouth/Throat:      Mouth: Mucous membranes are dry.      Pharynx: Oropharynx is clear.   Eyes:      General: No scleral icterus.     Conjunctiva/sclera: Conjunctivae normal.      Pupils: Pupils are equal, round, and reactive to light.   Cardiovascular:      Rate and Rhythm: Normal rate and regular rhythm.      Pulses: Normal pulses.      Heart sounds: Normal heart sounds.   Pulmonary:      Effort: Pulmonary effort is normal.      Breath sounds: Normal breath sounds.   Abdominal:      General: Bowel sounds are normal. There is no distension.      Palpations: Abdomen is soft.      Tenderness: There is no abdominal tenderness.   Musculoskeletal:      Right lower leg: No edema.      Left lower leg: No edema.   Skin:     General: Skin is warm and dry.      Capillary Refill: Capillary refill takes less than 2 seconds.      Coloration: Skin is not jaundiced.   Neurological:      General: No focal deficit present.      Mental Status: Mental status is at baseline.       Motor: Weakness (generalized) present.            CRANIAL NERVES     CN III, IV, VI   Pupils are equal, round, and reactive to light.     Significant Labs: All pertinent labs within the past 24 hours have been reviewed.  Recent Lab Results         07/14/23  0550        Anion Gap 15       Baso # 0.01       Basophil % 0.3       BUN 16       BUN/CREAT RATIO 14       Calcium 8.9       Chloride 111       CO2 24       Creatinine 1.16       Differential Type Auto       eGFR 60       Eos # 0.13       Eosinophil % 3.4       Glucose 98       Hematocrit 32.5       Hemoglobin 10.4       Lymph # 1.13       Lymph % 29.5       MCH 30.1       MCHC 32.0       MCV 93.9       Mono # 0.37       Mono % 9.7       MPV 10.8       Neutrophils, Abs 2.19       Neutrophils Relative 57.1       Platelets 222       Potassium 3.2       RBC 3.46       RDW 17.4       Sodium 147       WBC 3.83               Significant Imaging: I have reviewed all pertinent imaging results/findings within the past 24 hours.

## 2023-07-14 NOTE — SUBJECTIVE & OBJECTIVE
Interval History:  Patient seen and examined. No acute events overnight. Continue current POC. Referral for PEG placement.    Review of Systems   Unable to perform ROS: Dementia   Constitutional:  Positive for activity change and appetite change. Negative for chills and fever.   Respiratory:  Negative for cough and shortness of breath.    Gastrointestinal:  Negative for diarrhea and vomiting.   Musculoskeletal:  Positive for gait problem.   Neurological:  Positive for weakness (generalized).   All other systems reviewed and are negative.  Objective:     Vital Signs (Most Recent):  Temp: 97.9 °F (36.6 °C) (07/14/23 0713)  Pulse: (!) 57 (07/14/23 0713)  Resp: 19 (07/14/23 0713)  BP: (!) 156/101 (07/14/23 0713)  SpO2: 98 % (07/14/23 0713) Vital Signs (24h Range):  Temp:  [97.6 °F (36.4 °C)-99 °F (37.2 °C)] 97.9 °F (36.6 °C)  Pulse:  [56-93] 57  Resp:  [16-19] 19  SpO2:  [98 %-100 %] 98 %  BP: (109-161)/() 156/101     Weight: 73 kg (161 lb)  Body mass index is 23.1 kg/m².    Intake/Output Summary (Last 24 hours) at 7/14/2023 0946  Last data filed at 7/14/2023 0800  Gross per 24 hour   Intake 360 ml   Output --   Net 360 ml         Physical Exam  Vitals and nursing note reviewed.   Constitutional:       Appearance: He is ill-appearing.   HENT:      Head: Normocephalic.      Mouth/Throat:      Mouth: Mucous membranes are dry.      Pharynx: Oropharynx is clear.   Eyes:      General: No scleral icterus.     Pupils: Pupils are equal, round, and reactive to light.   Cardiovascular:      Rate and Rhythm: Normal rate.      Pulses: Normal pulses.      Heart sounds: Normal heart sounds.   Pulmonary:      Effort: Pulmonary effort is normal.      Breath sounds: Normal breath sounds.   Abdominal:      General: Bowel sounds are normal. There is no distension.      Palpations: Abdomen is soft.      Tenderness: There is no abdominal tenderness.   Musculoskeletal:      Right lower leg: No edema.      Left lower leg: No edema.    Skin:     General: Skin is warm and dry.      Capillary Refill: Capillary refill takes less than 2 seconds.   Neurological:      Mental Status: He is alert. Mental status is at baseline.           Significant Labs: All pertinent labs within the past 24 hours have been reviewed.  Recent Lab Results         07/14/23  0550        Anion Gap 15       Baso # 0.01       Basophil % 0.3       BUN 16       BUN/CREAT RATIO 14       Calcium 8.9       Chloride 111       CO2 24       Creatinine 1.16       Differential Type Auto       eGFR 60       Eos # 0.13       Eosinophil % 3.4       Glucose 98       Hematocrit 32.5       Hemoglobin 10.4       Lymph # 1.13       Lymph % 29.5       MCH 30.1       MCHC 32.0       MCV 93.9       Mono # 0.37       Mono % 9.7       MPV 10.8       Neutrophils, Abs 2.19       Neutrophils Relative 57.1       Platelets 222       Potassium 3.2       RBC 3.46       RDW 17.4       Sodium 147       WBC 3.83               Significant Imaging: I have reviewed all pertinent imaging results/findings within the past 24 hours.

## 2023-07-14 NOTE — PT/OT/SLP DISCHARGE
Physical therapy orders received and reviewed. Patient has a prior level of dependent care at Nursing Home and not a candidate for skilled therapy. Discussed with ordering provider and orders are to be removed.

## 2023-07-14 NOTE — PLAN OF CARE
[facility]  Discharge Final Note    Primary Care Provider: Mónica Cuenca MD    Expected Discharge Date: 7/14/2023    Final Discharge Note (most recent)       Final Note - 07/14/23 1042          Final Note    Anticipated Discharge Disposition Swing Bed [facility]    What phone number can be called within the next 1-3 days to see how you are doing after discharge? [phone]     Hospital Resources/Appts/Education Provided Provided patient/caregiver with written discharge plan information        Post-Acute Status    Post-Acute Authorization Other   swing bed at [facility]    Coverage Medicare     Patient choice form signed by patient/caregiver List with quality metrics by geographic area provided;List from CMS Compare;List from System Post-Acute Care     Discharge Delays None known at this time                 Pt. Will d/c from inpatient acute stay today and transfer to swing bed to [facility]. With IV antibiotics, ST, and supportive care. Spoke with pt. Daughter, Cary Kenny, and she stated that she has spoken with the rest of the family and they all want pt. To remain a Full Code and they want pt. To have a PEG tube placement done before he returns back to the NH. Will call to try to schedule PEG tube placement for next week. Will follow pt. In swing bed here at [facility].    Important Message from Medicare  Important Message from Medicare regarding Discharge Appeal Rights: Given to patient/caregiver, Explained to patient/caregiver, Signed/date by patient/caregiver     Date IMM was signed: 07/14/23  Time IMM was signed: 1000 (phone consent of daughter, Cary Kenny)

## 2023-07-14 NOTE — H&P
[facility]  Hospital Medicine  History & Physical    Patient Name: Jt Corrigan  MRN: [id number]  Patient Class: IP- Swing  Admission Date: 7/14/2023  Attending Physician: Jeanna Chan MD   Primary Care Provider: Mónica Cuenca MD         Patient information was obtained from relative(s), nursing home and ER records.     Subjective:     Principal Problem:Acute cystitis with hematuria    Chief Complaint: No chief complaint on file.       HPI: This is the patient was sent from the nursing home facility for AMS, decreased appetite.  Patient comes in he is had history of CVAs.  He is very malnourished.  He is had urinary tract infections in the past and also has had a recent altered mental status.  He has not been eating or drinking as he should be.  About 1-2 weeks ago he had received a L of normal saline IV to replenish from being dehydrated.  The patient and family wishes that he is a full code.  When it was found today that his blood pressure dropped the doctor the [facility] facility advised nursing staff seems the ER for further evaluation treatment.  Got the ER the patient was found to have mild urinary tract infection.  And hypernatremia. His UTI is being treated with 5 day course of Rocephin. Family wishing for PEG placement and referral has been sent to [facility] for review. Na has improved from 158 to 147. Potassium 3.2 treated with PO effor K. PT/OT to eval.    Code Status: FULL CODE      Past Medical History:   Diagnosis Date    Alzheimer's disease, unspecified (CODE)     Hypertension        History reviewed. No pertinent surgical history.    Review of patient's allergies indicates:  No Known Allergies    Current Facility-Administered Medications on File Prior to Encounter   Medication    [COMPLETED] potassium bicarbonate disintegrating tablet 40 mEq    [DISCONTINUED] acetaminophen tablet 1,000 mg    [DISCONTINUED] amLODIPine tablet 5 mg     [DISCONTINUED] cefTRIAXone (ROCEPHIN) 1 g in dextrose 5 % in water (D5W) 5 % 100 mL IVPB (MB+)    [DISCONTINUED] dextrose 5 % and 0.45 % NaCl infusion    [DISCONTINUED] docusate sodium capsule 100 mg    [DISCONTINUED] enoxaparin injection 40 mg    [DISCONTINUED] famotidine tablet 20 mg    [DISCONTINUED] finasteride tablet 5 mg    [DISCONTINUED] melatonin tablet 6 mg    [DISCONTINUED] multivitamin tablet    [DISCONTINUED] pantoprazole EC tablet 40 mg     Current Outpatient Medications on File Prior to Encounter   Medication Sig    amLODIPine (NORVASC) 5 MG tablet Take 1 tablet (5 mg total) by mouth once daily.    docusate sodium (COLACE) 100 MG capsule Take 100 mg by mouth 2 (two) times daily.    finasteride (PROSCAR) 5 mg tablet Take 5 mg by mouth once daily.    melatonin (MELATIN) 3 mg tablet Take 6 mg by mouth every evening.    multivitamin with minerals tablet Take 1 tablet by mouth once daily.    pantoprazole (PROTONIX) 40 MG tablet Take 1 tablet (40 mg total) by mouth 2 (two) times daily.    acetaminophen (TYLENOL) 500 MG tablet Take 1,000 mg by mouth every 8 (eight) hours as needed for Pain (elevated temp).     Family History    None       Tobacco Use    Smoking status: Unknown    Smokeless tobacco: Not on file   Substance and Sexual Activity    Alcohol use: Not on file    Drug use: Not on file    Sexual activity: Not on file     Review of Systems   Unable to perform ROS: Dementia   Constitutional:  Positive for activity change, appetite change and fatigue.   Respiratory:  Negative for cough and shortness of breath.    Gastrointestinal:  Negative for abdominal distention, diarrhea and vomiting.   Skin:  Negative for wound.   Neurological:  Positive for weakness (generalized).   Objective:     Vital Signs (Most Recent):  Temp: 98.6 °F (37 °C) (07/14/23 1120)  Pulse: 64 (07/14/23 1120)  Resp: 15 (07/14/23 1120)  BP: 125/80 (07/14/23 1120)  SpO2: 98 % (07/14/23 1120) Vital Signs (24h  Range):  Temp:  [97.9 °F (36.6 °C)-99 °F (37.2 °C)] 98.6 °F (37 °C)  Pulse:  [57-93] 64  Resp:  [15-19] 15  SpO2:  [98 %-100 %] 98 %  BP: (109-161)/() 125/80     Weight: 73 kg (161 lb)  Body mass index is 23.1 kg/m².     Physical Exam  Vitals and nursing note reviewed.   Constitutional:       General: He is sleeping.      Appearance: He is underweight. He is ill-appearing.   HENT:      Head: Normocephalic and atraumatic.      Right Ear: Tympanic membrane normal.      Left Ear: Tympanic membrane normal.      Nose: Nose normal.      Mouth/Throat:      Mouth: Mucous membranes are dry.      Pharynx: Oropharynx is clear.   Eyes:      General: No scleral icterus.     Conjunctiva/sclera: Conjunctivae normal.      Pupils: Pupils are equal, round, and reactive to light.   Cardiovascular:      Rate and Rhythm: Normal rate and regular rhythm.      Pulses: Normal pulses.      Heart sounds: Normal heart sounds.   Pulmonary:      Effort: Pulmonary effort is normal.      Breath sounds: Normal breath sounds.   Abdominal:      General: Bowel sounds are normal. There is no distension.      Palpations: Abdomen is soft.      Tenderness: There is no abdominal tenderness.   Musculoskeletal:      Right lower leg: No edema.      Left lower leg: No edema.   Skin:     General: Skin is warm and dry.      Capillary Refill: Capillary refill takes less than 2 seconds.      Coloration: Skin is not jaundiced.   Neurological:      General: No focal deficit present.      Mental Status: Mental status is at baseline.      Motor: Weakness (generalized) present.            CRANIAL NERVES     CN III, IV, VI   Pupils are equal, round, and reactive to light.     Significant Labs: All pertinent labs within the past 24 hours have been reviewed.  Recent Lab Results         07/14/23  0550        Anion Gap 15       Baso # 0.01       Basophil % 0.3       BUN 16       BUN/CREAT RATIO 14       Calcium 8.9       Chloride 111       CO2 24       Creatinine  1.16       Differential Type Auto       eGFR 60       Eos # 0.13       Eosinophil % 3.4       Glucose 98       Hematocrit 32.5       Hemoglobin 10.4       Lymph # 1.13       Lymph % 29.5       MCH 30.1       MCHC 32.0       MCV 93.9       Mono # 0.37       Mono % 9.7       MPV 10.8       Neutrophils, Abs 2.19       Neutrophils Relative 57.1       Platelets 222       Potassium 3.2       RBC 3.46       RDW 17.4       Sodium 147       WBC 3.83               Significant Imaging: I have reviewed all pertinent imaging results/findings within the past 24 hours.    Assessment/Plan:     * Acute cystitis with hematuria  Rocephin 1 gm IVPB daily for 5 days  Monitor response      Generalized weakness  PT/OT eval and treat  Weekly lab monitoring  Tx of UTI      Malnutrition  Referral to GI for PEG placement per family wishes      Hypernatremia  Weekly lab monitoring      Dementia  Monitor patient status  Fall risk precautions      VTE Risk Mitigation (From admission, onward)         Ordered     enoxaparin injection 30 mg  Daily         07/14/23 1300     IP VTE HIGH RISK PATIENT  Once         07/14/23 1300     Place SAUNDRA hose  Until discontinued         07/14/23 1300     Place sequential compression device  Until discontinued         07/14/23 1300                           DIANNE Meyer  Department of Hospital Medicine  [facility]

## 2023-07-14 NOTE — HPI
This is the patient was sent from the nursing home facility for AMS, decreased appetite.  Patient comes in he is had history of CVAs.  He is very malnourished.  He is had urinary tract infections in the past and also has had a recent altered mental status.  He has not been eating or drinking as he should be.  About 1-2 weeks ago he had received a L of normal saline IV to replenish from being dehydrated.  The patient and family wishes that he is a full code.  When it was found today that his blood pressure dropped the doctor the alf facility advised nursing staff seems the ER for further evaluation treatment.  Got the ER the patient was found to have mild urinary tract infection.  And hypernatremia. His UTI is being treated with 5 day course of Rocephin. Family wishing for PEG placement and referral has been sent to [facility] for review. Na has improved from 158 to 147. Potassium 3.2 treated with PO effor K. PT/OT to eval.    Code Status: FULL CODE

## 2023-07-15 NOTE — PLAN OF CARE
Problem: Adult Inpatient Plan of Care  Goal: Plan of Care Review  7/14/2023 2248 by Sonia Carranza LPN  Outcome: Ongoing, Progressing  7/14/2023 2248 by Sonia Carranza LPN  Outcome: Ongoing, Progressing  Goal: Patient-Specific Goal (Individualized)  7/14/2023 2248 by Sonia Carranza LPN  Outcome: Ongoing, Progressing  7/14/2023 2248 by Sonia Carranza LPN  Outcome: Ongoing, Progressing  Goal: Absence of Hospital-Acquired Illness or Injury  7/14/2023 2248 by Sonia Carranza LPN  Outcome: Ongoing, Progressing  7/14/2023 2248 by Sonia Carranza LPN  Outcome: Ongoing, Progressing  Goal: Optimal Comfort and Wellbeing  7/14/2023 2248 by Sonia Carranza LPN  Outcome: Ongoing, Progressing  7/14/2023 2248 by Sonia Carranza LPN  Outcome: Ongoing, Progressing  Goal: Readiness for Transition of Care  7/14/2023 2248 by Sonia Carranza LPN  Outcome: Ongoing, Progressing  7/14/2023 2248 by Sonia Carranza LPN  Outcome: Ongoing, Progressing     Problem: Impaired Wound Healing  Goal: Optimal Wound Healing  Outcome: Ongoing, Progressing

## 2023-07-15 NOTE — NURSING
Received pt from Dixie Mazariegos RN at this time.    New bag of fluids hung at this time according to provider order infusing @ 100mL/hr continuous. Rocephin infusing per provider order.    Pt dry at this time noted on nursing round check. Turned pt to right side at this time. Pillow under left hip. NADN; bed alarm on noted; call bell within reach; bed lowest position and locked.

## 2023-07-16 NOTE — NURSING
Pt more alert at this time. HOB elevated to 60 degrees. Nurse fed pt about 10 bites of supper. Pt tolerated well without coughing. On the last bite, pt held food in mouth & would not swallow it. After coaxing pt multiple times, pt finally swallowed food. Nurse did not continue to feed pt at this time d/t pt's risk for aspiration with holding food in his mouth. Left pt sitting up with HOB elevated to 60 degrees; no acute distress noted.   Yes

## 2023-07-16 NOTE — NURSING
Nurse & DARINEL Esparza CNA at bedside. Pt sitting up with HOB elevated to 60 degrees. Nurse gave pt meds crushed. Pt swallowed meds. DARINEL Esparza CNA gave pt another bite of breakfast & a few sips of fruit punch. Pt swallowed & mouth clear. After swallowing a few times, pt coughed 3 times. Nurse told DARINEL Esparza CNA not to feed pt anymore. Pt left sitting up with HOB elevated to 60 degrees. No acute distress noted.

## 2023-07-17 NOTE — PLAN OF CARE
Called and left message with Dr. [place]'s office to verify that they received the referral packet on pt. For PEG tube placement and to see when pt. Can be scheduled. Pt. Continues to receive IV fluids and IV Rocephin as ordered. Plan is to return back to [facility] after PEG tube placement. Follow.

## 2023-07-17 NOTE — NURSING
"@ 14:40PM WHILE ON X-RAY TABLE PT RECEIVED WITH GURGLING NOTED AFTER SECOND ATTEMPT OF DUB NEYDA PLACEMENT. RN PRESENT CHRISTINA ALONG WITH TECH AND X-RAY TECH NAVEED. RESPIRATORY CALLED IN ALONG WITH NP AB. VITALS OBTAINED AND ARE AS FOLLOWS: 1212/90, 16, 95%RA, 97. UNABLE TO TOLERATE DUOHOFF AB PROVIDER NOTIFIED. SHE STATED "HOLD OFF UNTIL TOMORROW. ASSISTED BACK TO HIS ROOM VIA BED. NO DISTRESS NOTED.   "

## 2023-07-17 NOTE — NURSING
@ 09:36AM CAREGIVER CALLED AND REQUESTED PEG UPDATE.  I INFORMED HER NO DATE HAS BEEN MENTIONED AS OF YET. SHE REQUESTED TO KNOW MORE. I INFORMED HER I WOULD GET  ALESSANDRA TO GIVE HER A CALL. SHE VERBALIZED UNDERSTANDING.

## 2023-07-17 NOTE — HOSPITAL COURSE
7/17: Patient awaiting PEG placement. He was not able to take puree diet this AM. RD consulted and recommended Dobhoff placement while we await GI referral for PEG. RD contacting patient's daughter to confirm that they want tube feedings while we waiting for GI to review case. RD to calculate tube feedings. Also requesting magnesium and phosphorus levels.     07/21/2023 HOSPITAL DAY 7  PT IS AN 89 YR OLS BM ADMITTED TO SWING BED FOR MEDICAL MANAGEMENT WITH HX PROFOUND WEAKNESS AND MALNUTRITION. PT WAS ADMITTED THROUGH THE ED TO [facility] WITH HYPOTENSION , DEHYDRATION WITH CLOTILDE, HYPOKALEMIA, HYPONATREMIA AND UTI. PT WAS TREATED WITH IVF, IV ROCEPHIN AND DOBHOFF WAS ATTEMPTED BUT WAS PLACEMENT WAS NOT POSSIBLE. PT'S URINE CULTURE REVEALED E COLI SENSITIVE TO ROCEPHIN WITH BC NEG. PT'S LABS HAVE IMPROVED WITH INITIAL BUN/CREAT 38/1.53 IMPROVED TO 2/1.07, NA//123 TO 2/1.07.  PT HAD PEG PLACEMENT TODAY PER DR ABERNATHY AND IS STABLE POSTOPERATIVELY AND TFF WILL BE STARTED WITH RD AS CONSULTANT.  PT IS UNABLE TO PARTICIPATE WITH PT/OT DUE TO MARKED DEBILITY; PT WAS EVALUATED PER PT/OT AND IS NOT A CANDIDATE FOR REHABILITAION AT THIS TIME.  PT IS ON RA WITH O2  SAT 98 %. PT IS BEING TREATED PER RESPIRATORY THERAPY O2 MONITORING.  PT IS ON ROCEPHIN FOR UTI DT E COLI.THE PHARMACY IS MONITORING ALL MEDICATIONS. RD WILL CONTINUE TO MONITOR.  PT HAS STABLE LABS EXCEPT FOR CMP: , CA 8.3, K 3.4, ALB 2.6 , CBC: WBC 4.39 K, H/H 8.8/26.2. TSH IS 1.90, MAG IS 1.8.VIT D 37.  PT HAS CULTURE RESULTS REVEALING URINE E COLI, BC NEG INITIALLY ON 7/11/2023.    07/24/2023 HOSPITAL DAY 10  DISCHARGE    PT IS AN 89 YR OLD BM ADMITTED TO SWING BED FOR MEDICAL MANAGEMENT WITH HX PROFOUND WEAKNESS AND MALNUTRITION. PT WAS ADMITTED THROUGH THE ED TO [facility] WITH HYPOTENSION , DEHYDRATION WITH CLOTILDE, HYPOKALEMIA, HYPONATREMIA AND UTI. PT WAS TREATED WITH IVF, IV ROCEPHIN AND DOBHOFF WAS ATTEMPTED BUT  PLACEMENT WAS NOT  POSSIBLE. PT'S URINE CULTURE REVEALED E COLI SENSITIVE TO ROCEPHIN WITH BC NEG. PT'S LABS HAVE IMPROVED WITH INITIAL BUN/CREAT 38/1.53 IMPROVED TO 2/1.07, NA//123 TO 2/1.07.  PT HAD PEG PLACEMENT PER DR ABERNATHY AND IS STABLE POSTOPERATIVELY AND TFF STARTED WITH SARAH AS CONSULTANT AND SHE HAS EVALUATED PT AND MADE RECOMMENDATIONS.  PT IS UNABLE TO PARTICIPATE WITH PT/OT DUE TO MARKED DEBILITY; PT WAS EVALUATED PER PT/OT AND IS NOT A CANDIDATE FOR REHABILITAION AT THIS TIME.  PT IS ON RA WITH O2  SAT 98 %. PT WAS TREATED PER RESPIRATORY THERAPY O2 MONITORING.  PT WAS ON ROCEPHIN FOR UTI DT E COLI AND WILL BE DC ON BACTRIM. THE PHARMACY  MONITORED ALL MEDICATIONS.   PT HAS STABLE LABS EXCEPT FOR CMP: BUN 2, K 3.0, ALB 2.6 , CBC: WBC 4.39 K, H/H 8.8/26.2. TSH IS 1.90, MAG IS 1.8.VIT D 37.  PT HAD CULTURE RESULTS REVEALING URINE E COLI, BC NEG INITIALLY ON 7/11/2023.  PT WILL BE DC TO [facility] FOR FURTHER MEDICAL CARE.

## 2023-07-17 NOTE — NURSING
@ 17:32PM RESTING IN BED WITH EYES CLOSED. RESPIRATIONS EVEN AND AND NON LABORED. SAFETY MEASURES IN PLACE. NO FACIAL GRIMACING PRESENT. REMAINS NPO. IV FLUIDS REMAIN CONTINOUS; SITE HEALTHY. NO NEEDS VISUALIZED. WILL REPORT OFF TO ONCOMING SHIFT.

## 2023-07-17 NOTE — CONSULTS
[facility] - Medical Surgical Unit  Adult Nutrition  Consult Note    SUMMARY     Recommendations    Recommendation/Intervention: 1. Dobhoff TF until PEG can be placed, start TF with Isosource 1.5@25ml x 24 hours as tolerated then inc by 10ml every 8 hours until goal rate of 45ml is met.  Flush with 40ml water every hour.  This TF at goal provides= 1620 kcal, 73g PRO, 1785ml free water 2040ml free water.  2. Mg, Phos to assess for refeeding syndrome  Goals: Meet % via NS  Nutrition Goal Status: new  Communication of RD Recs: reviewed with physician  3. NPO  Assessment and Plan    No new Assessment & Plan notes have been filed under this hospital service since the last note was generated.  Service: Nutrition       Malnutrition Assessment  Malnutrition Context: chronic illness  Malnutrition Level: moderate          Weight Loss (Malnutrition): greater than 10% in 6 months (13% WL, 85% IBW)  Subcutaneous Fat (Malnutrition): moderate depletion  Muscle Mass (Malnutrition): moderate depletion                         Reason for Assessment    Reason For Assessment: consult (swing bed pt)  Diagnosis:  (dehydration, UTI, change in mental status, malnutrition)  Relevant Medical History: Dementia, CVA, PCM, UTI, GERD  Interdisciplinary Rounds: attended  General Information Comments: RDN reviewed EMR. This pt has declined including change in MS, holding food in his mouth, delayed swallow, R to eat at times.  He has lost ~20# since 2/23.  Meal intake <25%.  ST rodriguez noted. Family desires PEG. He is currently receiving IVF.  Last BM 7/16. RDN obtained consent for Dobhoff tube placement and feeding from Ms. Avila and Ms. Kenny and discussed pt with NANCY Montemayor NP.     Nutrition Risk Screen    Nutrition Risk Screen: reduced oral intake over the last month, unintentional loss of 10 lbs or more in the past 2 months, difficulty chewing/swallowing    Nutrition/Diet History    Patient Reported  "Diet/Restrictions/Preferences: pureed  Spiritual, Cultural Beliefs, Yazidism Practices, Values that Affect Care: no  Food Allergies: NKFA  Factors Affecting Nutritional Intake: chewing difficulties/inability to chew food, decreased appetite, difficulty/impaired swallowing, inability to feed self    Anthropometrics    Temp: 99.5 °F (37.5 °C)  Height: 5' 10" (177.8 cm)  Height (inches): 70 in  Weight Method: Bed Scale  Weight: 63.5 kg (140 lb)  Weight (lb): 140 lb  Ideal Body Weight (IBW), Male: 166 lb  % Ideal Body Weight, Male (lb): 84.34 %  BMI (Calculated): 20.1  BMI Grade: 18.5-24.9 - normal  Weight Loss: unintentional  Usual Body Weight (UBW), k kg  % Usual Body Weight: 87.17  % Weight Change From Usual Weight: -13.01 %       Lab/Procedures/Meds    Pertinent Labs Reviewed: reviewed  Pertinent Labs Comments: Na 147, Cl 111-improved, Alb 2.7  Pertinent Medications Reviewed: reviewed  Pertinent Medications Comments: Pepcid, Rocephin, Norvasc, Colace    Physical Findings/Assessment         Estimated/Assessed Needs    Weight Used For Calorie Calculations: 63.5 kg (140 lb)  Energy Calorie Requirements (kcal): 0791-1858484  Energy Need Method: Kcal/kg  Protein Requirements: 64-70  Weight Used For Protein Calculations: 63.5 kg (140 lb)     Estimated Fluid Requirement Method:  (3894-4351)  RDA Method (mL): 1587         Nutrition Prescription Ordered    Current Diet Order: Pureed    Evaluation of Received Nutrient/Fluid Intake    Energy Calories Required: not meeting needs  Protein Required: not meeting needs  Fluid Required: meeting needs (IVF)  Tolerance: not tolerating  % Intake of Estimated Energy Needs: 0 - 25 %  % Meal Intake: 0 - 25 %    Nutrition Risk    Level of Risk/Frequency of Follow-up: high       Monitor and Evaluation    Food and Nutrient Intake: enteral nutrition intake  Food and Nutrient Adminstration: enteral and parenteral nutrition administration  Anthropometric Measurements: weight  Biochemical " Data, Medical Tests and Procedures: electrolyte and renal panel  Nutrition-Focused Physical Findings: overall appearance       Nutrition Follow-Up    RD Follow-up?: Yes

## 2023-07-18 NOTE — PLAN OF CARE
[facility] - Medical Surgical Unit - Swing Bed   Interdisciplinary Team Meeting    Patient: Jt Corrigan   Today's Date: 7/18/2023   Estimated D/C Date: 7/25/23        Physician: Nurse Practitioner: WARREN Sanchez   Pharmacy:Tung Contreras, Pharm D Unit Director:  GLADYS Meehan   : Ashlee Mack RN Physical/Occupational Therapy: Tristen Rees OT   Speech Therapy: ST Jeff    Nursing: Libby Gutierrez RN  Respiratory: Britt Mondragon, Resp. Dietary:  Amol Carranza, Dietary  Other:      Nurse  New Symptoms/Problems:   Last Bowel Movement: 07/17/23 Urine: incontinent Diarrhea: No   Constipated: No Bowel: incontinent Fagan: No   Isolation: No D/C Date:  Date:    O2 Device: Room Air O2 Flow:   SpO2: 97 %   Nutrition: NPO  Speech/Swallowing: Speech difficulty and Swallowing difficulty  Aspiration Precautions: Yes  Cognition: Memory issues    Physical Therapy  Physical Therapy/Gait: unable to ambulate ELOS: Plan to DC  7/25/23   Transfers: Total Assistance with mika lift Range of Motion/Restrictions: contractures     Occupational Therapy  Eating/Grooming: Total Assistance Toileting: Total Assistance   Bathing: Total Assistance Dressing (Upper Body): Total Assistance   Dressing (Lower Body): Total Assistance        Tx Plan/Recommendations reviewed with family and/or patient on (date) 7/17/23.  Additional family Conference/Training:   D/C Plan/Recommendations: Discharge to SNF  CANDIDA: 7/25/23    Pharmacy  Medication Changes (see MD orders in chart): Yes  MD:Angel Suh D.O. Labs Reviewed: Yes New Lab Orders: Yes   MD/NP Signature:

## 2023-07-18 NOTE — PLAN OF CARE
Received phone call from Charlene at Dr. [place]'s office and they have pt. Scheduled for EGD with PEG tube placement on Friday 7/21/23 at 11:30am at [facility]. Pt. Set up by ambulance for transport to be picked up at 10:30am here from [place]. Spoke with Cary Kenny, pt. Daughter and she stated that family will meet pt. There at [place] for the procedure to sign consent forms. Will follow.

## 2023-07-19 NOTE — PLAN OF CARE
Problem: Skin Injury Risk Increased  Goal: Skin Health and Integrity  Intervention: Optimize Skin Protection  Flowsheets (Taken 7/19/2023 0140)  Pressure Reduction Techniques: frequent weight shift encouraged  Skin Protection: incontinence pads utilized  Head of Bed (HOB) Positioning: HOB elevated     Problem: Fall Injury Risk  Goal: Absence of Fall and Fall-Related Injury  Intervention: Identify and Manage Contributors  Flowsheets (Taken 7/19/2023 0140)  Medication Review/Management: medications reviewed  Intervention: Promote Injury-Free Environment  Flowsheets (Taken 7/19/2023 0140)  Safety Promotion/Fall Prevention:   assistive device/personal item within reach   side rails raised x 3   room near unit station   lighting adjusted

## 2023-07-19 NOTE — PROGRESS NOTES
Hold enoxaparin 30 mg Thursday/Friday 7-20/21-23 prior to PEG procedure.  Reinitiate enoxaparin 30 mg Saturday 7-22-23.  Thank You

## 2023-07-20 NOTE — PLAN OF CARE
Problem: Skin Injury Risk Increased  Goal: Skin Health and Integrity  Intervention: Optimize Skin Protection  Flowsheets (Taken 7/20/2023 0203)  Pressure Reduction Techniques: frequent weight shift encouraged  Pressure Reduction Devices:   heel offloading device utilized   positioning supports utilized  Skin Protection: incontinence pads utilized  Head of Bed (HOB) Positioning: HOB elevated     Problem: Fall Injury Risk  Goal: Absence of Fall and Fall-Related Injury  Intervention: Promote Injury-Free Environment  Flowsheets (Taken 7/20/2023 0203)  Safety Promotion/Fall Prevention:   assistive device/personal item within reach   room near unit station

## 2023-07-21 PROBLEM — R62.7 ADULT FAILURE TO THRIVE: Status: ACTIVE | Noted: 2023-01-01

## 2023-07-21 PROBLEM — R13.11 ORAL PHASE DYSPHAGIA: Status: ACTIVE | Noted: 2023-01-01

## 2023-07-21 PROBLEM — K44.9 HH (HIATUS HERNIA): Status: ACTIVE | Noted: 2023-01-01

## 2023-07-21 NOTE — DISCHARGE INSTRUCTIONS
Procedure Date  7/21/23     Impression  Overall Impression: After 2g IV ancef, skin prep and drape, a 20F PEG was placed into the stomach via pull technique without difficulty. The skin was injected to the stomach with 1% lidocaine prior to the skin incision. The esophagus has normal mucosa. A small hiatus hernia is noted and gastritis is present. Mucosa of the duodenum is normal.     Recommendation    Follow up with PCP      Cleanse PEG site with soap and water at least daily starting 7/22/23.  Start PEG feedings, flushes and PEG site care.  Discharge:   Disp: DC with family and/or staff. Resume meds and activity. F/U with PCP

## 2023-07-21 NOTE — NURSING
@ 15:46PM RETURNED BACK TO HOSPITAL VIA STRETCHER/AMBUALANCE WITH EYES OPEN. NO DISTRESS NOTED. ASSISTED EMS ALONG WITH OTHER STAFF WITH TRANSFERING TO BED. VITALS OBTAINED AND PEG SITE IS CLEAN, DRY AND INTACT. NO DRAINAGE NOTED. OCTAVIO COURTNEY PRESENT @ BEDSIDE TO HELP ASSIST ME.

## 2023-07-21 NOTE — PLAN OF CARE
Pt. As planned EGD with PEG tube placement today at [facility] at 11:30 am. The ambulance will be here to transport pt. To have procedure done and then return back to [facility]. Pt. Family has said that they will be meeting pt. There to sign consent for procedure. Plan will be for pt. To return back to [facility] next week. Will cont. To follow.

## 2023-07-21 NOTE — NURSING
Peg tube feedings along with wound care to site will start tomorrow 7-22-23. Provider notified and aware. She will entered in orders to reflect this. I will also report off to oncoming shift.

## 2023-07-21 NOTE — ANESTHESIA PREPROCEDURE EVALUATION
07/21/2023  Jt Corrigan is a 89 y.o., male.      Pre-op Assessment    I have reviewed the Patient Summary Reports.     I have reviewed the Nursing Notes. I have reviewed the NPO Status.   I have reviewed the Medications.     Review of Systems  Anesthesia Hx:  No problems with previous Anesthesia    Social:  No Alcohol Use, Non-Smoker    Cardiovascular:   Hypertension, well controlled    Neurological:  Dementia moderate    Psych:   Psychiatric History          Physical Exam  General: Cooperative, Oriented and Alert    Airway:  Mallampati: II   Mouth Opening: Normal  TM Distance: Normal  Tongue: Normal  Neck ROM: Normal ROM        Anesthesia Plan  Type of Anesthesia, risks & benefits discussed:    Anesthesia Type: Gen Natural Airway, MAC  Intra-op Monitoring Plan: Standard ASA Monitors  Post Op Pain Control Plan: multimodal analgesia and IV/PO Opioids PRN  Induction:  IV  Informed Consent: Informed consent signed with the Patient and all parties understand the risks and agree with anesthesia plan.  All questions answered. Patient consented to blood products? Yes  ASA Score: 3  Day of Surgery Review of History & Physical: I have interviewed and examined the patient. I have reviewed the patient's H&P dated: There are no significant changes.     Ready For Surgery From Anesthesia Perspective.     Past Medical History:   Diagnosis Date    Alzheimer's disease, unspecified (CODE)     Hypertension        History reviewed. No pertinent surgical history.    History reviewed. No pertinent family history.    Social History     Socioeconomic History    Marital status: Unknown   Tobacco Use    Smoking status: Unknown     Social Determinants of Health     Financial Resource Strain: Low Risk     Difficulty of Paying Living Expenses: Not hard at all   Food Insecurity: No Food Insecurity    Worried About Running Out of Food  in the Last Year: Never true    Ran Out of Food in the Last Year: Never true   Transportation Needs: No Transportation Needs    Lack of Transportation (Medical): No    Lack of Transportation (Non-Medical): No   Physical Activity: Inactive    Days of Exercise per Week: 0 days    Minutes of Exercise per Session: 0 min   Stress: No Stress Concern Present    Feeling of Stress : Not at all   Social Connections: Socially Isolated    Frequency of Communication with Friends and Family: Once a week    Frequency of Social Gatherings with Friends and Family: Once a week    Attends Tenriism Services: Never    Active Member of Clubs or Organizations: No    Attends Club or Organization Meetings: Never    Marital Status:    Housing Stability: Low Risk     Unable to Pay for Housing in the Last Year: No    Number of Places Lived in the Last Year: 1    Unstable Housing in the Last Year: No       Current Outpatient Medications   Medication Sig Dispense Refill    acetaminophen (TYLENOL) 500 MG tablet Take 1,000 mg by mouth every 8 (eight) hours as needed for Pain (elevated temp).      amLODIPine (NORVASC) 5 MG tablet Take 1 tablet (5 mg total) by mouth once daily. 30 tablet 11    docusate sodium (COLACE) 100 MG capsule Take 100 mg by mouth 2 (two) times daily.      finasteride (PROSCAR) 5 mg tablet Take 5 mg by mouth once daily.      melatonin (MELATIN) 3 mg tablet Take 6 mg by mouth every evening.      multivitamin with minerals tablet Take 1 tablet by mouth once daily.      pantoprazole (PROTONIX) 40 MG tablet Take 1 tablet (40 mg total) by mouth 2 (two) times daily. 120 tablet 0     No current facility-administered medications for this encounter.     Facility-Administered Medications Ordered in Other Encounters   Medication Dose Route Frequency Provider Last Rate Last Admin    [MAR Hold - Suspended Admission] acetaminophen tablet 650 mg  650 mg Oral Q4H PRN DIANNE Meyer        [MAR Hold -  Suspended Admission] aluminum-magnesium hydroxide-simethicone 200-200-20 mg/5 mL suspension 30 mL  30 mL Oral Q6H PRN DIANNE Meyer        [MAR Hold - Suspended Admission] amLODIPine tablet 5 mg  5 mg Oral Daily DIANNE Meyer   5 mg at 07/17/23 0836    [MAR Hold - Suspended Admission] ascorbic acid (vitamin C) tablet 500 mg  500 mg Per NG tube BID DIANNE Meyer        [MAR Hold - Suspended Admission] dextrose 5 % and 0.45 % NaCl infusion   Intravenous Continuous DIANNE Meyer   Stopped at 07/21/23 0708    [MAR Hold - Suspended Admission] docusate sodium capsule 100 mg  100 mg Oral BID DIANNE Meyer   100 mg at 07/17/23 0835    [MAR Hold - Suspended Admission] enoxaparin injection 30 mg  30 mg Subcutaneous Q24H (prophylaxis, 1700) Jeanna Chan MD        [MAR Hold - Suspended Admission] famotidine tablet 20 mg  20 mg Oral BID DIANNE Meyer   20 mg at 07/17/23 0835    [MAR Hold - Suspended Admission] magnesium hydroxide 400 mg/5 ml suspension 2,400 mg  30 mL Oral Daily PRN DIANNE Meyer        [MAR Hold - Suspended Admission] melatonin tablet 6 mg  6 mg Oral Nightly PRN DIANNE Meyer        [MAR Hold - Suspended Admission] multivit-min-ferrous gluconate 9 mg iron/ 15 mL (15 mL) oral liquid 15 mL  15 mL Oral Daily Jeanna Chan MD        [MAR Hold - Suspended Admission] ondansetron disintegrating tablet 4 mg  4 mg Oral Q8H PRN DIANNE Meyre        [MAR Hold - Suspended Admission] senna tablet 8.6 mg  8.6 mg Oral Daily PRN DIANNE Meyer           Review of patient's allergies indicates:  No Known Allergies  .    Patient Active Problem List   Diagnosis    Dementia    Contracture of joint    HTN (hypertension)    Esophagitis    Acute superficial gastritis without hemorrhage    Acute cystitis with hematuria    Hypernatremia    Malnutrition    Generalized weakness

## 2023-07-21 NOTE — NURSING
"I ASKED  ALESSANDRA WAS A CONSENT OBTAINED FOR PEG TUBE PLACEMENT? SHE STATED "I ALREADY TALKED WITH DAUGHTER AND THEY WILL HANDLE ALL THAT IN [place] WHEN HE GETS DOWN THERE". CHARGE NURSE INFORMED.   "

## 2023-07-21 NOTE — ANESTHESIA POSTPROCEDURE EVALUATION
Anesthesia Post Evaluation    Patient: Jt Corrigan    Procedure(s) Performed: EGD    Final Anesthesia Type: general      Patient location during evaluation: GI PACU  Patient participation: Yes- Able to Participate  Level of consciousness: awake and alert  Post-procedure vital signs: reviewed and stable  Pain management: adequate  Airway patency: patent    PONV status at discharge: No PONV  Anesthetic complications: no      Cardiovascular status: blood pressure returned to baseline and hemodynamically stable  Respiratory status: spontaneous ventilation  Hydration status: euvolemic  Follow-up not needed.  Comments: Pt voices appreciation for care          Vitals Value Taken Time   /48 07/21/23 1411   Temp 97.7 07/21/23 1429   Pulse 56 07/21/23 1411   Resp 20 07/21/23 1410   SpO2 100 % 07/21/23 1407   Vitals shown include unvalidated device data.      No case tracking events are documented in the log.      Pain/Sharad Score: Sharad Score: 8 (7/21/2023  1:25 PM)         Central Prior Authorization Team   Phone: [phone]      PA Initiation    Medication: max fills for Norco  Insurance Company: [employer] - Phone [phone] Fax [phone]  Pharmacy Filling the Rx: [facility] [place] - [address] [place] [address]  Filling Pharmacy Phone: [phone]  Filling Pharmacy Fax: [phone]  Start Date: 4/4/2022

## 2023-07-21 NOTE — NURSING
Nurses Note -- 4 Eyes      7/21/2023   07:08 AM      Skin assessed during: Q Shift Change      [x] No Altered Skin Integrity Present    []Prevention Measures Documented      [] Yes- Altered Skin Integrity Present or Discovered   [] LDA Added if Not in Epic (Describe Wound)   [] New Altered Skin Integrity was Present on Admit and Documented in LDA   [] Wound Image Taken    Wound Care Consulted? No    Attending Nurse:  Zahida Bain LPN     Second RN/Staff Member:  NEY VAN    @ 07:08AM RESTING IN BED WITH EYES CLOSED. RESPIRAITONS EVEN AND NON LABROED. NO DISTRESS NOTED. SAFETY MEASURES IN PLACE. OFF GOING NURSE NEY GARCIA PRESENT PERFORMING BEDSIDE SHIFT REPORT. COMMUNCIATION BOARD UPDATED. NO OTHER NEEDS VISUALIZED. IV FLUIDS INFUSING. NO FACIAL GRIMACING PRESENT. OCTAVIO COURTNEY WILL RESOURCE ME WITH THIS PT.

## 2023-07-21 NOTE — H&P
[facility]  Gastroenterology  H&P    Patient Name: Jt Corrigan  MRN: [id number]  Admission Date: 7/21/2023  Code Status: Full Code    Attending Provider: Rocky York MD   Primary Care Physician: Mónica Cuenca MD  Principal Problem:<principal problem not specified>    Subjective:     History of Present Illness: Pt presents for egd with peg due to malnourished condition with dementia.    Past Medical History:   Diagnosis Date    Alzheimer's disease, unspecified (CODE)     Hypertension        History reviewed. No pertinent surgical history.    Review of patient's allergies indicates:  No Known Allergies  Family History    None       Tobacco Use    Smoking status: Unknown    Smokeless tobacco: Not on file   Substance and Sexual Activity    Alcohol use: Not on file    Drug use: Not on file    Sexual activity: Not on file     Review of Systems   Unable to perform ROS: Dementia   Objective:     Vital Signs (Most Recent):  Pulse: (!) 58 (07/21/23 1220)  Resp: 11 (07/21/23 1220)  BP: (!) 155/52 (07/21/23 1220)  SpO2: 100 % (07/21/23 1220) Vital Signs (24h Range):  Temp:  [98.3 °F (36.8 °C)-98.6 °F (37 °C)] 98.3 °F (36.8 °C)  Pulse:  [58-81] 58  Resp:  [11-18] 11  SpO2:  [98 %-100 %] 100 %  BP: (152-159)/(52-79) 155/52        There is no height or weight on file to calculate BMI.    No intake or output data in the 24 hours ending 07/21/23 1253    Lines/Drains/Airways       Peripheral Intravenous Line  Duration                  Peripheral IV - Single Lumen  Right Antecubital -- days         Peripheral IV - Single Lumen 07/21/23 1020 20 G Anterior;Distal;Right Upper Arm <1 day                    Physical Exam  Vitals reviewed.   Constitutional:       General: He is not in acute distress.     Appearance: Normal appearance. He is well-developed. He is ill-appearing.   HENT:      Head: Normocephalic and atraumatic.      Nose: Nose normal.   Eyes:      Pupils: Pupils are equal, round, and reactive to  light.   Cardiovascular:      Rate and Rhythm: Normal rate and regular rhythm.   Pulmonary:      Effort: Pulmonary effort is normal.      Breath sounds: Normal breath sounds. No wheezing.   Abdominal:      General: Abdomen is flat. Bowel sounds are normal. There is no distension.      Palpations: Abdomen is soft.      Tenderness: There is no abdominal tenderness. There is no guarding.   Skin:     General: Skin is warm and dry.      Coloration: Skin is not jaundiced.   Neurological:      Mental Status: He is alert.   Psychiatric:         Attention and Perception: Attention normal.         Mood and Affect: Affect normal.         Speech: Speech normal.         Behavior: Behavior is cooperative.      Comments: Pt was calm while speaking.       Significant Labs:  CBC:   Recent Labs   Lab 07/21/23  0542   WBC 3.17*   HGB 10.9*   HCT 32.7*        CMP:   Recent Labs   Lab 07/21/23  0542   GLU 92   CALCIUM 8.9      K 2.9*   CO2 28      BUN 2*   CREATININE 1.07       Significant Imaging:  Imaging results within the past 24 hours have been reviewed.    Assessment/Plan:     There are no hospital problems to display for this patient.        Imp: failure to thrive, dementia  Plan: egd with PEG    Rocky York MD  Gastroenterology  [facility]

## 2023-07-21 NOTE — TRANSFER OF CARE
Anesthesia Transfer of Care Note    Patient: Jt Corrigan    Procedure(s) Performed: * No procedures listed *    Patient location: GI    Anesthesia Type: general    Transport from OR: Transported from OR on room air with adequate spontaneous ventilation. Continuous ECG monitoring in transport. Continuous SpO2 monitoring in transport    Post pain: adequate analgesia    Post assessment: no apparent anesthetic complications    Post vital signs: stable    Level of consciousness: sedated and responds to stimulation    Nausea/Vomiting: no nausea/vomiting    Complications: none    Transfer of care protocol was followedComments: Good SV continue, NAD, VSS, RTRN      Last vitals:   Visit Vitals  BP (!) 131/50 (BP Location: Right leg, Patient Position: Lying)   Pulse (!) 53   Temp 36.4 °C (97.5 °F) (Oral)   Resp 18   SpO2 100%

## 2023-07-21 NOTE — NURSING
@ resting in bed with eyes closed. respiraitons even and non labored. no distress noted. safety measures in place. no facial griamcing present. brief changed with assistance from tech. room stocked up with briefs, pads and wipes. sugical site (peg) remains clean, dry and intact. I will report off to oncoming shift. New bag of iv fluids hung

## 2023-07-22 NOTE — PLAN OF CARE
Problem: Adult Inpatient Plan of Care  Goal: Plan of Care Review  7/22/2023 1613 by Nimco Lin RN  Outcome: Ongoing, Progressing     Problem: Adult Inpatient Plan of Care  Goal: Patient-Specific Goal (Individualized)  7/22/2023 1613 by Nimco Lin RN  Outcome: Ongoing, Progressing     Problem: Adult Inpatient Plan of Care  Goal: Absence of Hospital-Acquired Illness or Injury  7/22/2023 1613 by Nimco Lin RN  Outcome: Ongoing, Progressing     Problem: Adult Inpatient Plan of Care  Goal: Optimal Comfort and Wellbeing  7/22/2023 1613 by Nimco Lin RN  Outcome: Ongoing, Progressing     Problem: Adult Inpatient Plan of Care  Goal: Readiness for Transition of Care  7/22/2023 1613 by Nimco Lin RN  Outcome: Ongoing, Progressing     Problem: Impaired Wound Healing  Goal: Optimal Wound Healing  7/22/2023 1613 by Nimco Lin RN  Outcome: Ongoing, Progressing     Problem: Skin Injury Risk Increased  Goal: Skin Health and Integrity  7/22/2023 1613 by Nimco Lin RN  Outcome: Ongoing, Progressing     Problem: Infection  Goal: Absence of Infection Signs and Symptoms  7/22/2023 1613 by Nimco Lin RN  Outcome: Ongoing, Progressing     Problem: Fall Injury Risk  Goal: Absence of Fall and Fall-Related Injury  7/22/2023 1613 by Nimco Lin RN  Outcome: Ongoing, Progressing

## 2023-07-22 NOTE — NURSING
"Nurses Note -- 4 Eyes      7/22/2023   7:05 AM      Skin assessed during: Q Shift Change      [x] No Altered Skin Integrity Present    []Prevention Measures Documented      [] Yes- Altered Skin Integrity Present or Discovered   [] LDA Added if Not in Epic (Describe Wound)   [] New Altered Skin Integrity was Present on Admit and Documented in LDA   [] Wound Image Taken    Wound Care Consulted? No    Attending Nurse:  NEY Monaco RN/Staff Member:  OCTAVIO MASON    @ 07:05AM RESTING IN BED WITH EYES CLOSED. RESPIRTIONS EVEN AND NON LABORED. NO DISTRESS NOTED. OFF GOING NURSE PRESENT PERFORMING SHIFT REPORT. NO FACIAL GRIMACING. IV FLUIDS REMAIN CONTINOUS ALONG WITH  TUBE FEEDING. DRESSING REMAINS C,D,I TO PEG SITE. OFF GOING NURSE STATED "I HAVE ALREADY PERFORMED WOUND CARE TO PEG SITE FOR THE DAY". COMMUNICATION BOARD UPDATED. NO OTHER NEEDS VISUALIZED. ANASTACIARN WILL RESOURCE ME WITH THIS PT.       "

## 2023-07-23 NOTE — NURSING
Rec'd laying on left side asleep. HOB elevated. Resp even and unlabored. Eyes closed. Opened  to physical touch. Nonverbal. MMM. Skin intact and dry. Lungs clear. Abd nontender. Dressing to PEG site c/d/I. IsoSource 1.5 @35ml/hr with 40ml/hr flush. 22g INT in upper (R) arm, patent and capped. Bilat hand rolls. Pillow between legs. CB near. SR X 4. Bed in low position.

## 2023-07-23 NOTE — NURSING
Checked temp rectal 99.7 advised Dr. Suh. No new orders. Not within range to treat. Pt is on IV ATB.

## 2023-07-24 PROBLEM — Z29.9 ENCOUNTER FOR DEEP VEIN THROMBOSIS (DVT) PROPHYLAXIS: Status: ACTIVE | Noted: 2023-01-01

## 2023-07-24 NOTE — ASSESSMENT & PLAN NOTE
Rocephin 1 gm IVPB daily for 5 days  Monitor response  07/21/2023   PT HAS UTI DUE TO E COLI SENSITIVE TO ROCEPHIN  CONTINUE ROCEPHIN

## 2023-07-24 NOTE — PROGRESS NOTES
[facility] Surgical Unit  Hospital Medicine  Progress Note    Patient Name: Jt Corrigan  MRN: [id number]  Patient Class: IP- Swing   Admission Date: 7/14/2023  Length of Stay: 10 days  Attending Physician: Jeanna Chan MD  Primary Care Provider: Mónica Cuenca MD        Subjective:     Principal Problem:Acute cystitis with hematuria        HPI:  This is the patient was sent from the nursing home facility for AMS, decreased appetite.  Patient comes in he is had history of CVAs.  He is very malnourished.  He is had urinary tract infections in the past and also has had a recent altered mental status.  He has not been eating or drinking as he should be.  About 1-2 weeks ago he had received a L of normal saline IV to replenish from being dehydrated.  The patient and family wishes that he is a full code.  When it was found today that his blood pressure dropped the doctor the alf facility advised nursing staff seems the ER for further evaluation treatment.  Got the ER the patient was found to have mild urinary tract infection.  And hypernatremia. His UTI is being treated with 5 day course of Rocephin. Family wishing for PEG placement and referral has been sent to [facility] for review. Na has improved from 158 to 147. Potassium 3.2 treated with PO effor K. PT/OT to eval.    Code Status: FULL CODE      Overview/Hospital Course:  7/17: Patient awaiting PEG placement. He was not able to take puree diet this AM. RD consulted and recommended Dobhoff placement while we await GI referral for PEG. RD contacting patient's daughter to confirm that they want tube feedings while we waiting for GI to review case. RD to calculate tube feedings. Also requesting magnesium and phosphorus levels.     07/21/2023 HOSPITAL DAY 7  PT IS AN 89 YR OLS BM ADMITTED TO SWING BED FOR MEDICAL MANAGEMENT WITH HX PROFOUND WEAKNESS AND MALNUTRITION. PT WAS ADMITTED THROUGH THE ED TO [facility]  WITH HYPOTENSION , DEHYDRATION WITH CLOTILDE, HYPOKALEMIA, HYPONATREMIA AND UTI. PT WAS TREATED WITH IVF, IV ROCEPHIN AND DOBHOFF WAS ATTEMPTED BUT WAS PLACEMENT WAS NOT POSSIBLE. PT'S URINE CULTURE REVEALED E COLI SENSITIVE TO ROCEPHIN WITH BC NEG. PT'S LABS HAVE IMPROVED WITH INITIAL BUN/CREAT 38/1.53 IMPROVED TO 2/1.07, NA//123 TO 2/1.07.  PT HAD PEG PLACEMENT TODAY PER DR ABERNATHY AND IS STABLE POSTOPERATIVELY AND TFF WILL BE STARTED WITH RD AS CONSULTANT.  PT IS UNABLE TO PARTICIPATE WITH PT/OT DUE TO MARKED DEBILITY; PT WAS EVALUATED PER PT/OT AND IS NOT A CANDIDATE FOR REHABILITAION AT THIS TIME.  PT IS ON RA WITH O2  SAT 98 %. PT IS BEING TREATED PER RESPIRATORY THERAPY O2 MONITORING.  PT IS ON ROCEPHIN FOR UTI DT E COLI.THE PHARMACY IS MONITORING ALL MEDICATIONS. RD WILL CONTINUE TO MONITOR.  PT HAS STABLE LABS EXCEPT FOR CMP: BUN 2, K 3.0, ALB 2.6 , CBC: WBC 4.39 K, H/H 8.8/26.2. TSH IS 1.90, MAG IS 1.8.  PT HAS CULTURE RESULTS REVEALING URINE E COLI, BC NEG INITIALLY ON 7/11/2023.        Interval History: PT IS AN 89 YR OLS BM ADMITTED TO SWING BED FOR MEDICAL MANAGEMENT WITH HX PROFOUND WEAKNESS AND MALNUTRITION. PT WAS ADMITTED THROUGH THE ED TO [facility] WITH HYPOTENSION , DEHYDRATION WITH CLOTILDE, HYPOKALEMIA, HYPONATREMIA AND UTI. PT WAS TREATED WITH IVF, IV ROCEPHIN AND DOBHOFF WAS ATTEMPTED BUT WAS PLACEMENT WAS NOT POSSIBLE. PT'S URINE CULTURE REVEALED E COLI SENSITIVE TO ROCEPHIN WITH BC NEG. PT'S LABS HAVE IMPROVED WITH INITIAL BUN/CREAT 38/1.53 IMPROVED TO 2/1.07, NA//123 TO 2/1.07.  PT HAD PEG PLACEMENT TODAY PER DR ABERNATHY AND IS STABLE POSTOPERATIVELY AND TFF WILL BE STARTED WITH RD AS CONSULTANT.  PT IS UNABLE TO PARTICIPATE WITH PT/OT DUE TO MARKED DEBILITY; PT WAS EVALUATED PER PT/OT AND IS NOT A CANDIDATE FOR REHABILITAION AT THIS TIME.  PT IS ON RA WITH O2  SAT 98 %. PT IS BEING TREATED PER RESPIRATORY THERAPY O2 MONITORING.  PT IS ON ROCEPHIN FOR UTI DT E COLI.THE PHARMACY IS  MONITORING ALL MEDICATIONS. RD WILL CONTINUE TO MONITOR.  PT HAS STABLE LABS EXCEPT FOR CMP: BUN 2, K 3.0, ALB 2.6 , CBC: WBC 4.39 K, H/H 8.8/26.2. TSH IS 1.90, MAG IS 1.8.  PT HAS CULTURE RESULTS REVEALING URINE E COLI, BC NEG INITIALLY ON 7/11/2023.    Review of Systems   Reason unable to perform ROS: DEMENTIA.   Objective:     Vital Signs (Most Recent):  Temp: 97.6 °F (36.4 °C) (07/24/23 0703)  Pulse: (!) 59 (07/24/23 0703)  Resp: 18 (07/24/23 0703)  BP: 121/63 (07/24/23 0703)  SpO2: (!) 94 % (07/24/23 0703) Vital Signs (24h Range):  Temp:  [97.6 °F (36.4 °C)-99.3 °F (37.4 °C)] 97.6 °F (36.4 °C)  Pulse:  [59-61] 59  Resp:  [16-18] 18  SpO2:  [94 %-100 %] 94 %  BP: (102-128)/(62-87) 121/63     Weight: 66.7 kg (147 lb)  Body mass index is 21.09 kg/m².    Intake/Output Summary (Last 24 hours) at 7/24/2023 0739  Last data filed at 7/24/2023 0603  Gross per 24 hour   Intake 1120 ml   Output --   Net 1120 ml         Physical Exam  Vitals and nursing note reviewed.   Constitutional:       Appearance: He is ill-appearing.      Comments: ELDERLY, FRAIL  EYES ARE OPEN   HENT:      Head: Normocephalic and atraumatic.      Right Ear: External ear normal.      Left Ear: External ear normal.      Nose: Nose normal.      Mouth/Throat:      Mouth: Mucous membranes are moist.   Eyes:      Pupils: Pupils are equal, round, and reactive to light.   Cardiovascular:      Rate and Rhythm: Regular rhythm. Bradycardia present.   Pulmonary:      Effort: Pulmonary effort is normal.      Breath sounds: Normal breath sounds.   Abdominal:      General: Abdomen is flat. There is no distension.      Tenderness: There is no abdominal tenderness. There is no guarding.   Musculoskeletal:         General: Deformity (PT HAS CONTRACTURE OF EXTREMITIES) present. No swelling or tenderness.      Cervical back: Normal range of motion and neck supple.      Comments: PT DOES NOT MOVE EXTREMITIES ON COMMAND   Skin:     General: Skin is warm and dry.       Capillary Refill: Capillary refill takes less than 2 seconds.      Coloration: Skin is pale.      Findings: Lesion (POST OP PEG SITE APPEARS NORMAL) present.   Neurological:      Mental Status: He is alert.      Motor: Weakness present.      Comments: PT IS APHASIC  PT DOES NOT FOLLOW COMMANDS   Psychiatric:      Comments: PT IS APHASIC           Significant Labs: All pertinent labs within the past 24 hours have been reviewed.  SEE HOSPITAL COURSE  Significant Imaging: I have reviewed all pertinent imaging results/findings within the past 24 hours.NONE      Assessment/Plan:      * Acute cystitis with hematuria  Rocephin 1 gm IVPB daily for 5 days  Monitor response  07/21/2023   PT HAS UTI DUE TO E COLI SENSITIVE TO ROCEPHIN  CONTINUE ROCEPHIN      Encounter for deep vein thrombosis (DVT) prophylaxis  PT IS AT RISK FOR VTE  VTE PPX  LOVENOX, TEDS  PT IS NONAMBULATORY      Generalized weakness  PT/OT eval and treat  Weekly lab monitoring  Tx of UTI  07/21/2023  PT IS NOT A CANDIDATE FOR REHABILITATION PER PT/OT RECOMMENDATION  PT HAS ECOLI UTI AND WILL CONTINUE TREATMENT WITH ROCEPHIN      Malnutrition  Referral to GI for PEG placement per family wishes  07/21/2023  PT HAD PEG PLACEMENT TODAY PER DR ABERNATHY AND IS STABLE POSTOPERATIVELY AND TFF WILL BE STARTED WITH RD AS CONSULTANT.        Hypernatremia  Weekly lab monitoring  07/21/2023  IMPROVED  NA//123 /105      HTN (hypertension)  PT HAS HX HYPERTENSION  PT HAD HYPOTENSION DUE TO HYPOVOLEMIA AND UTI IN ED  PT WAS TREATED WITH IVF  /69  CONTINUE AMLODIPINE      Dementia  Monitor patient status  Fall risk precautions  07/21/2023  PT HAS SEVERE DEMENTIA  PEG PLACED      VTE Risk Mitigation (From admission, onward)         Ordered     enoxaparin injection 30 mg  Every 24 hours         07/19/23 0925     IP VTE HIGH RISK PATIENT  Once         07/14/23 1300     Place SAUNDRA hose  Until discontinued         07/14/23 1300     Place sequential compression  device  Until discontinued         07/14/23 1300                Discharge Planning   CANDIDA:      Code Status: Full Code   Is the patient medically ready for discharge?:     Reason for patient still in hospital (select all that apply): Patient new problem, Patient trending condition, Laboratory test, Treatment, Consult recommendations, PT / OT recommendations and Pending disposition  Discharge Plan A: Return to nursing home                  Jeanna Chan MD  Department of Hospital Medicine   [facility]

## 2023-07-24 NOTE — ASSESSMENT & PLAN NOTE
PT/OT eval and treat  Weekly lab monitoring  Tx of UTI  07/21/2023  PT IS NOT A CANDIDATE FOR REHABILITATION PER PT/OT RECOMMENDATION  PT HAS ECOLI UTI AND WILL CONTINUE TREATMENT WITH ROCEPHIN

## 2023-07-24 NOTE — PROGRESS NOTES
Wt: 147# (wt up 7# since admit) This is a desired increase.  S/p PEG.  Pt is tolerating TF at goal rate of 45ml.  Last BM was 7/24.  K 3.0 (supplemented) Phos, Mg supplemented.  For D/C to NH today.

## 2023-07-24 NOTE — SUBJECTIVE & OBJECTIVE
Interval History: PT IS AN 89 YR OLS BM ADMITTED TO SWING BED FOR MEDICAL MANAGEMENT WITH HX PROFOUND WEAKNESS AND MALNUTRITION. PT WAS ADMITTED THROUGH THE ED TO [facility] WITH HYPOTENSION , DEHYDRATION WITH CLOTILDE, HYPOKALEMIA, HYPONATREMIA AND UTI. PT WAS TREATED WITH IVF, IV ROCEPHIN AND DOBHOFF WAS ATTEMPTED BUT WAS PLACEMENT WAS NOT POSSIBLE. PT'S URINE CULTURE REVEALED E COLI SENSITIVE TO ROCEPHIN WITH BC NEG. PT'S LABS HAVE IMPROVED WITH INITIAL BUN/CREAT 38/1.53 IMPROVED TO 2/1.07, NA//123 TO 2/1.07.  PT HAD PEG PLACEMENT TODAY PER DR ABERNATHY AND IS STABLE POSTOPERATIVELY AND TFF WILL BE STARTED WITH RD AS CONSULTANT.  PT IS UNABLE TO PARTICIPATE WITH PT/OT DUE TO MARKED DEBILITY; PT WAS EVALUATED PER PT/OT AND IS NOT A CANDIDATE FOR REHABILITAION AT THIS TIME.  PT IS ON RA WITH O2  SAT 98 %. PT IS BEING TREATED PER RESPIRATORY THERAPY O2 MONITORING.  PT IS ON ROCEPHIN FOR UTI DT E COLI.THE PHARMACY IS MONITORING ALL MEDICATIONS. RD WILL CONTINUE TO MONITOR.  PT HAS STABLE LABS EXCEPT FOR CMP: BUN 2, K 3.0, ALB 2.6 , CBC: WBC 4.39 K, H/H 8.8/26.2. TSH IS 1.90, MAG IS 1.8.  PT HAS CULTURE RESULTS REVEALING URINE E COLI, BC NEG INITIALLY ON 7/11/2023.    Review of Systems   Reason unable to perform ROS: DEMENTIA.   Objective:     Vital Signs (Most Recent):  Temp: 97.6 °F (36.4 °C) (07/24/23 0703)  Pulse: (!) 59 (07/24/23 0703)  Resp: 18 (07/24/23 0703)  BP: 121/63 (07/24/23 0703)  SpO2: (!) 94 % (07/24/23 0703) Vital Signs (24h Range):  Temp:  [97.6 °F (36.4 °C)-99.3 °F (37.4 °C)] 97.6 °F (36.4 °C)  Pulse:  [59-61] 59  Resp:  [16-18] 18  SpO2:  [94 %-100 %] 94 %  BP: (102-128)/(62-87) 121/63     Weight: 66.7 kg (147 lb)  Body mass index is 21.09 kg/m².    Intake/Output Summary (Last 24 hours) at 7/24/2023 0739  Last data filed at 7/24/2023 0603  Gross per 24 hour   Intake 1120 ml   Output --   Net 1120 ml         Physical Exam  Vitals and nursing note reviewed.   Constitutional:       Appearance: He  is ill-appearing.      Comments: ELDERLY, FRAIL  EYES ARE OPEN   HENT:      Head: Normocephalic and atraumatic.      Right Ear: External ear normal.      Left Ear: External ear normal.      Nose: Nose normal.      Mouth/Throat:      Mouth: Mucous membranes are moist.   Eyes:      Pupils: Pupils are equal, round, and reactive to light.   Cardiovascular:      Rate and Rhythm: Regular rhythm. Bradycardia present.   Pulmonary:      Effort: Pulmonary effort is normal.      Breath sounds: Normal breath sounds.   Abdominal:      General: Abdomen is flat. There is no distension.      Tenderness: There is no abdominal tenderness. There is no guarding.   Musculoskeletal:         General: Deformity (PT HAS CONTRACTURE OF EXTREMITIES) present. No swelling or tenderness.      Cervical back: Normal range of motion and neck supple.      Comments: PT DOES NOT MOVE EXTREMITIES ON COMMAND   Skin:     General: Skin is warm and dry.      Capillary Refill: Capillary refill takes less than 2 seconds.      Coloration: Skin is pale.      Findings: Lesion (POST OP PEG SITE APPEARS NORMAL) present.   Neurological:      Mental Status: He is alert.      Motor: Weakness present.      Comments: PT IS APHASIC  PT DOES NOT FOLLOW COMMANDS   Psychiatric:      Comments: PT IS APHASIC           Significant Labs: All pertinent labs within the past 24 hours have been reviewed.  SEE HOSPITAL COURSE  Significant Imaging: I have reviewed all pertinent imaging results/findings within the past 24 hours.NONE

## 2023-07-24 NOTE — PLAN OF CARE
[facility] - Medical Surgical Unit  Discharge Final Note    Primary Care Provider: Mónica Cuenca MD    Expected Discharge Date: 7/24/2023    Final Discharge Note (most recent)       Final Note - 07/24/23 1153          Final Note    Assessment Type Final Discharge Note     Anticipated Discharge Disposition Skilled Nursing Facility   [facility]    What phone number can be called within the next 1-3 days to see how you are doing after discharge? [phone]     Hospital Resources/Appts/Education Provided Provided patient/caregiver with written discharge plan information        Post-Acute Status    Post-Acute Authorization Placement     Post-Acute Placement Status Patient List Provided     Coverage Medicare     Patient choice form signed by patient/caregiver List with quality metrics by geographic area provided;List from CMS Compare;List from System Post-Acute Care     Discharge Delays None known at this time                 Pt. Will be d/c back to the [facility] today to be followed by Dr. Wade at the NH. Pt. Will cont. With tube feedings as ordered here at [facility]. No other d/c needs were identified. Pt. Family aware. Packet left with nurses at nurses station.     Important Message from Medicare  Important Message from Medicare regarding Discharge Appeal Rights: Given to patient/caregiver, Explained to patient/caregiver, Signed/date by patient/caregiver     Date IMM was signed: 07/24/23  Time IMM was signed: 0925 (phone consent of Cary Kenny, daughter as pt. is nonverbal)

## 2023-07-24 NOTE — ASSESSMENT & PLAN NOTE
Referral to GI for PEG placement per family wishes  07/21/2023  PT HAD PEG PLACEMENT TODAY PER DR ABERNATHY AND IS STABLE POSTOPERATIVELY AND TFF WILL BE STARTED WITH RD AS CONSULTANT.

## 2023-07-24 NOTE — ASSESSMENT & PLAN NOTE
PT HAS HX HYPERTENSION  PT HAD HYPOTENSION DUE TO HYPOVOLEMIA AND UTI IN ED  PT WAS TREATED WITH IVF  /69  CONTINUE AMLODIPINE

## 2023-07-24 NOTE — NURSING
1236 Report called to Dimple at [facility] in [place].   1326 Staff from [facility] arrived to  pt

## 2023-07-30 NOTE — ED TRIAGE NOTES
Presents to ED via EMS from [facility] for PEG replacement. Patient's PEG came out and staff attempted to replace it and use it but do not feel like it is in place.

## 2023-07-30 NOTE — ED PROVIDER NOTES
Encounter Date: 7/30/2023       History     Chief Complaint   Patient presents with    PEG replacement     88 y/o male brought to the ED per EMS from local nursing facility for reported PEG tube complication.  It was reported that PEG was replaced, that when new was flushed it seemed to be leaking.  Patient is not responsive per his baseline.  Site of PEG is free of swelling and redness.  Noted 7ml in the inflation bulb, flushed PEG with 20cc of water with no difficulty and no leaking noted.      Review of patient's allergies indicates:  No Known Allergies  Past Medical History:   Diagnosis Date    Alzheimer's disease, unspecified (CODE)     Arthritis     Hypertension      History reviewed. No pertinent surgical history.  History reviewed. No pertinent family history.  Social History     Tobacco Use    Smoking status: Unknown   Substance Use Topics    Drug use: Not Currently     Review of Systems   Constitutional:  Negative for fever.   HENT:  Negative for sore throat.    Respiratory:  Negative for shortness of breath.    Cardiovascular:  Negative for chest pain.   Gastrointestinal:  Negative for nausea.   Genitourinary:  Negative for dysuria.   Musculoskeletal:  Negative for back pain.   Skin:  Negative for rash.   Neurological:  Negative for weakness.   Hematological:  Does not bruise/bleed easily.   All other systems reviewed and are negative.      Physical Exam     Initial Vitals [07/30/23 0855]   BP Pulse Resp Temp SpO2   (!) 113/51 83 17 97.8 °F (36.6 °C) 100 %      MAP       --         Physical Exam    Constitutional: He appears well-nourished. He appears cachectic. He is cooperative. He does not have a sickly appearance. He does not appear ill. No distress.   HENT:   Head: Normocephalic.   Eyes: EOM are normal. Pupils are equal, round, and reactive to light.   Neck: Neck supple.   Cardiovascular:  Normal rate, regular rhythm, normal heart sounds and intact distal pulses.           Pulmonary/Chest: Breath  sounds normal.   Abdominal: Abdomen is soft. Bowel sounds are normal.   Musculoskeletal:         General: Normal range of motion.      Cervical back: Neck supple.     Neurological: He is oriented to person, place, and time. He has normal strength. No cranial nerve deficit or sensory deficit. GCS score is 15. GCS eye subscore is 4. GCS verbal subscore is 5. GCS motor subscore is 6.   Skin: Skin is warm and dry. Capillary refill takes less than 2 seconds.   Psychiatric: Cognition and memory are impaired. He is noncommunicative.   Patient non verbal per baseline He is inattentive.         Medical Screening Exam   See Full Note    ED Course   Procedures  Labs Reviewed - No data to display       Imaging Results              XR Gastric tube check, non-radiologist performed (Final result)  Result time 07/30/23 09:37:45   Procedure changed from X-Ray KUB     Final result by Chetan Santamaria DO (07/30/23 09:37:45)                   Impression:      Gastrostomy tube overlies multiple small bowel loops within the left hemiabdomen.  There is nonvisualization of the stomach. There is hyperdense contrast within multiple small bowel loops within the left hemiabdomen.      Electronically signed by: Chetan Santamaria  Date:    07/30/2023  Time:    09:37               Narrative:    EXAMINATION:  XR GASTRIC TUBE CHECK, NON-RADIOLOGIST PERFORMED    CLINICAL HISTORY:  peg tube placement;    TECHNIQUE:  XR GASTRIC TUBE CHECK, NON-RADIOLOGIST PERFORMED    COMPARISON:  7/13/*23    FINDINGS:  Gastrostomy tube overlies multiple small bowel loops within the left hemiabdomen.  There is nonvisualization of the stomach.  There is hyperdense contrast within multiple small bowel loops within the left hemiabdomen.                                       Medications   diatrizoate meglumineand-diatrizoate sodium (GASTROVIEW) solution 30 mL (30 mLs Per G Tube Given 7/30/23 0913)     Medical Decision Making:   Initial Assessment:   89 y/o male brought  to the ED per EMS from local nursing facility for reported PEG tube complication.  It was reported that PEG was replaced, that when new was flushed it seemed to be leaking.  Patient is not responsive per his baseline.  Site of PEG is free of swelling and redness.  Noted 7ml in the inflation bulb, flushed PEG with 20cc of water with no difficulty and no leaking noted.  Differential Diagnosis:   PEG dysfunction  ED Management:  Presented with possible PEG tube leaking/dysfunction  On exam PEG tube intact, no leaking, flushed easily  KUB with gastrografin performed, satisfactory placement of tube noted  No new Rx needed                         Clinical Impression:   Final diagnoses:  [K94.23] PEG tube malfunction (Primary)        ED Disposition Condition    Discharge Stable          ED Prescriptions    None       Follow-up Information    None          DIANNE Vee  07/30/23 0946

## 2023-08-08 NOTE — ED PROVIDER NOTES
Encounter Date: 8/7/2023       History   No chief complaint on file.    Patient comes in after vomiting 2 times a nursing home.  Vital signs are stable.  There seems to be no shortness of breath no chest pain.        Review of patient's allergies indicates:  No Known Allergies  Past Medical History:   Diagnosis Date    Alzheimer's disease, unspecified (CODE)     Arthritis     Hypertension      No past surgical history on file.  No family history on file.  Social History     Tobacco Use    Smoking status: Unknown   Substance Use Topics    Drug use: Not Currently     Review of Systems   Constitutional:  Positive for fatigue. Negative for fever.   HENT: Negative.  Negative for sore throat.    Eyes: Negative.    Respiratory: Negative.  Negative for shortness of breath.    Cardiovascular: Negative.  Negative for chest pain.   Gastrointestinal:  Positive for nausea and vomiting.   Endocrine: Negative.    Genitourinary: Negative.  Negative for dysuria.   Musculoskeletal: Negative.  Negative for back pain.   Skin: Negative.  Negative for rash.   Allergic/Immunologic: Negative.    Neurological: Negative.  Negative for weakness.   Hematological: Negative.  Does not bruise/bleed easily.   Psychiatric/Behavioral: Negative.         Physical Exam     Initial Vitals [08/07/23 1923]   BP Pulse Resp Temp SpO2   116/67 91 20 99.2 °F (37.3 °C) 99 %      MAP       --         Physical Exam    Constitutional: He appears well-developed and well-nourished.   HENT:   Head: Normocephalic and atraumatic.   Right Ear: External ear normal.   Left Ear: External ear normal.   Nose: Nose normal.   Mouth/Throat: Oropharynx is clear and moist.   Eyes: Conjunctivae and EOM are normal. Pupils are equal, round, and reactive to light.   Neck: Neck supple.   Normal range of motion.  Cardiovascular:  Normal rate, regular rhythm, normal heart sounds and intact distal pulses.           Pulmonary/Chest: Breath sounds normal.   Abdominal: Abdomen is soft.  Bowel sounds are normal.   Patient with nausea vomiting   Genitourinary:    Prostate and penis normal.     Musculoskeletal:         General: Normal range of motion.      Cervical back: Normal range of motion and neck supple.     Neurological: He is alert and oriented to person, place, and time. He has normal strength and normal reflexes.   Skin: Skin is warm and dry.   Psychiatric: He has a normal mood and affect. His behavior is normal. Judgment and thought content normal.         Medical Screening Exam   See Full Note    ED Course   Procedures  Labs Reviewed   COMPREHENSIVE METABOLIC PANEL - Abnormal; Notable for the following components:       Result Value    Sodium 133 (*)     Glucose 133 (*)     BUN 41 (*)     Creatinine 1.44 (*)     BUN/Creatinine Ratio 28 (*)     Total Protein 8.3 (*)     Albumin 2.9 (*)     Globulin 5.4 (*)     eGFR 46 (*)     All other components within normal limits   CBC WITH DIFFERENTIAL - Abnormal; Notable for the following components:    WBC 12.69 (*)     RBC 3.20 (*)     Hemoglobin 9.4 (*)     Hematocrit 30.1 (*)     MCHC 31.2 (*)     RDW 15.9 (*)     Platelet Count 452 (*)     MPV 8.8 (*)     Neutrophils % 86.5 (*)     Lymphocytes % 6.0 (*)     Monocytes % 6.5 (*)     Eosinophils % 0.0 (*)     Immature Granulocytes % 0.8 (*)     Neutrophils, Abs 10.99 (*)     Lymphocytes, Absolute 0.76 (*)     Monocytes, Absolute 0.82 (*)     Immature Granulocytes, Absolute 0.10 (*)     All other components within normal limits   CBC W/ AUTO DIFFERENTIAL    Narrative:     The following orders were created for panel order CBC Auto Differential.  Procedure                               Abnormality         Status                     ---------                               -----------         ------                     CBC with Differential[[id number]]        Abnormal            Final result                 Please view results for these tests on the individual orders.          Imaging Results               X-Ray Chest AP Portable (Final result)  Result time 08/07/23 20:00:17      Final result by Sidney Reynoso MD (08/07/23 20:00:17)                   Impression:      No gross evidence of an acute process.  Shallow breath.  Motion artifact      Electronically signed by: Sidney Reynoso  Date:    08/07/2023  Time:    20:00               Narrative:    EXAMINATION:  XR CHEST AP PORTABLE    CLINICAL HISTORY:  Rule out aspiration from vomiting;.    COMPARISON:  July 17, 2023    TECHNIQUE:  AP chest    FINDINGS:  There is some moderate patient motion artifact.    Cardiomediastinal silhouette is unchanged.  There is no gross pulmonary vascular engorgement.  Lungs are grossly clear when accounting for motion artifact and shallow breath.  Skin folds overlie the right hemithorax.  There is no gross pleural effusion.    Osseous structures are similar                                       Medications - No data to display  Medical Decision Making:   Initial Assessment:   Patient in with nausea vomiting 2 times in a nursing home.  Differential Diagnosis:   Patient with 2 episodes of vomiting.  ED Management:  Will transfer the patient back to nursing home in [facility]                         Clinical Impression:   Final diagnoses:  [R11.10] Vomiting, unspecified vomiting type, unspecified whether nausea present (Primary)        ED Disposition Condition    Discharge Stable          ED Prescriptions    None       Follow-up Information    None          Angel Suh, DO  08/07/23 7990

## 2023-09-21 NOTE — DISCHARGE SUMMARY
[facility] - Medical Surgical Unit  Hospital Medicine  Discharge Summary      Patient Name: Jt Corrigan  MRN: [id number]  MANJEET: [id number]  Patient Class: IP- Swing  Admission Date: 7/14/2023  Hospital Length of Stay: 10 days  Discharge Date and Time: 7/24/2023  1:36 PM  Attending Physician: No att. providers found   Discharging Provider: Jeanna Chan MD  Primary Care Provider: Mónica Cuenca MD    Primary Care Team: Networked reference to record PCT     HPI:   This is the patient was sent from the nursing home facility for AMS, decreased appetite.  Patient comes in he is had history of CVAs.  He is very malnourished.  He is had urinary tract infections in the past and also has had a recent altered mental status.  He has not been eating or drinking as he should be.  About 1-2 weeks ago he had received a L of normal saline IV to replenish from being dehydrated.  The patient and family wishes that he is a full code.  When it was found today that his blood pressure dropped the doctor the [facility] facility advised nursing staff seems the ER for further evaluation treatment.  Got the ER the patient was found to have mild urinary tract infection.  And hypernatremia. His UTI is being treated with 5 day course of Rocephin. Family wishing for PEG placement and referral has been sent to [facility] for review. Na has improved from 158 to 147. Potassium 3.2 treated with PO effor K. PT/OT to eval.    Code Status: FULL CODE      * No surgery found *      Hospital Course:   7/17: Patient awaiting PEG placement. He was not able to take puree diet this AM. RD consulted and recommended Dobhoff placement while we await GI referral for PEG. RD contacting patient's daughter to confirm that they want tube feedings while we waiting for GI to review case. RD to calculate tube feedings. Also requesting magnesium and phosphorus levels.     07/21/2023 HOSPITAL DAY 7  PT IS AN 89 YR OLS BM ADMITTED TO  SWING BED FOR MEDICAL MANAGEMENT WITH HX PROFOUND WEAKNESS AND MALNUTRITION. PT WAS ADMITTED THROUGH THE ED TO [facility] WITH HYPOTENSION , DEHYDRATION WITH CLOTILDE, HYPOKALEMIA, HYPONATREMIA AND UTI. PT WAS TREATED WITH IVF, IV ROCEPHIN AND DOBHOFF WAS ATTEMPTED BUT WAS PLACEMENT WAS NOT POSSIBLE. PT'S URINE CULTURE REVEALED E COLI SENSITIVE TO ROCEPHIN WITH BC NEG. PT'S LABS HAVE IMPROVED WITH INITIAL BUN/CREAT 38/1.53 IMPROVED TO 2/1.07, NA//123 TO 2/1.07.  PT HAD PEG PLACEMENT TODAY PER DR ABERNATHY AND IS STABLE POSTOPERATIVELY AND TFF WILL BE STARTED WITH RD AS CONSULTANT.  PT IS UNABLE TO PARTICIPATE WITH PT/OT DUE TO MARKED DEBILITY; PT WAS EVALUATED PER PT/OT AND IS NOT A CANDIDATE FOR REHABILITAION AT THIS TIME.  PT IS ON RA WITH O2  SAT 98 %. PT IS BEING TREATED PER RESPIRATORY THERAPY O2 MONITORING.  PT IS ON ROCEPHIN FOR UTI DT E COLI.THE PHARMACY IS MONITORING ALL MEDICATIONS. RD WILL CONTINUE TO MONITOR.  PT HAS STABLE LABS EXCEPT FOR CMP: , CA 8.3, K 3.4, ALB 2.6 , CBC: WBC 4.39 K, H/H 8.8/26.2. TSH IS 1.90, MAG IS 1.8.VIT D 37.  PT HAS CULTURE RESULTS REVEALING URINE E COLI, BC NEG INITIALLY ON 7/11/2023.    07/24/2023 HOSPITAL DAY 10  DISCHARGE    PT IS AN 89 YR OLD BM ADMITTED TO SWING BED FOR MEDICAL MANAGEMENT WITH HX PROFOUND WEAKNESS AND MALNUTRITION. PT WAS ADMITTED THROUGH THE ED TO [facility] WITH HYPOTENSION , DEHYDRATION WITH CLOTILDE, HYPOKALEMIA, HYPONATREMIA AND UTI. PT WAS TREATED WITH IVF, IV ROCEPHIN AND DOBHOFF WAS ATTEMPTED BUT  PLACEMENT WAS NOT POSSIBLE. PT'S URINE CULTURE REVEALED E COLI SENSITIVE TO ROCEPHIN WITH BC NEG. PT'S LABS HAVE IMPROVED WITH INITIAL BUN/CREAT 38/1.53 IMPROVED TO 2/1.07, NA//123 TO 2/1.07.  PT HAD PEG PLACEMENT PER DR ABERNATHY AND IS STABLE POSTOPERATIVELY AND TFF STARTED WITH RD AS CONSULTANT AND SHE HAS EVALUATED PT AND MADE RECOMMENDATIONS.  PT IS UNABLE TO PARTICIPATE WITH PT/OT DUE TO MARKED DEBILITY; PT WAS EVALUATED PER PT/OT AND  IS NOT A CANDIDATE FOR REHABILITAION AT THIS TIME.  PT IS ON RA WITH O2  SAT 98 %. PT WAS TREATED PER RESPIRATORY THERAPY O2 MONITORING.  PT WAS ON ROCEPHIN FOR UTI DT E COLI AND WILL BE DC ON BACTRIM. THE PHARMACY  MONITORED ALL MEDICATIONS.   PT HAS STABLE LABS EXCEPT FOR CMP: BUN 2, K 3.0, ALB 2.6 , CBC: WBC 4.39 K, H/H 8.8/26.2. TSH IS 1.90, MAG IS 1.8.VIT D 37.  PT HAD CULTURE RESULTS REVEALING URINE E COLI, BC NEG INITIALLY ON 7/11/2023.  PT WILL BE [facility] TO [facility] FOR FURTHER MEDICAL CARE.       Goals of Care Treatment Preferences:  Code Status: Full Code      Consults:     Neuro  Dementia  Monitor patient status  Fall risk precautions  07/21/2023  PT HAS SEVERE DEMENTIA  PEG PLACED  07/24/2023  DC TO NH CONTINUE MEDICAL MANAGEMENT AT NH      Cardiac/Vascular  HTN (hypertension)  PT HAS HX HYPERTENSION  PT HAD HYPOTENSION DUE TO HYPOVOLEMIA AND UTI IN ED  PT WAS TREATED WITH IVF  /69  CONTINUE AMLODIPINE  07/24/2023 CONTINUE POC ON DC /63      Renal/  * Acute cystitis with hematuria  Rocephin 1 gm IVPB daily for 5 days  Monitor response  07/21/2023   PT HAS UTI DUE TO E COLI SENSITIVE TO ROCEPHIN  CONTINUE ROCEPHIN  07/24/2023  BACTRIM ON DC      Hypernatremia  Weekly lab monitoring  07/21/2023  IMPROVED  NA//123 /105  07/24/2023   RESOLVED 138/103      Hematology  Encounter for deep vein thrombosis (DVT) prophylaxis  PT IS AT RISK FOR VTE  VTE PPX  LOVENOX, TEDS  PT IS NONAMBULATORY  07/24/2023  DC TO NH; VTE PPX PER PROVIDER AT NH      Endocrine  Malnutrition  Referral to GI for PEG placement per family wishes  07/21/2023  PT HAD PEG PLACEMENT TODAY PER DR ABERNATHY AND IS STABLE POSTOPERATIVELY AND TFF WILL BE STARTED WITH SARAH AS CONSULTANT.  07/24/2023 CONTINUE TFF AT NH  RD HAS EVALUATED PT AND MADE RECOMMENDATIONS      Other  Generalized weakness  PT/OT eval and treat  Weekly lab monitoring  Tx of UTI  07/21/2023  PT IS NOT A CANDIDATE FOR REHABILITATION PER PT/OT  RECOMMENDATION  PT HAS ECOLI UTI AND WILL CONTINUE TREATMENT WITH ROCEPHIN  07/24/2023  DC TO NH      Final Active Diagnoses:    Diagnosis Date Noted POA    PRINCIPAL PROBLEM:  Acute cystitis with hematuria [N30.01] 07/13/2023 Yes    Encounter for deep vein thrombosis (DVT) prophylaxis [Z29.9] 07/24/2023 Not Applicable    Malnutrition [E46] 07/14/2023 Yes    Generalized weakness [R53.1] 07/14/2023 Yes    Hypernatremia [E87.0] 07/13/2023 Yes    Dementia [F03.90] 02/04/2023 Yes    HTN (hypertension) [I10] 02/04/2023 Yes      Problems Resolved During this Admission:       Discharged Condition: stable    Disposition: Skilled Nursing Facility    Follow Up:    Patient Instructions:   No discharge procedures on file.    Significant Diagnostic Studies: Labs: All labs within the past 24 hours have been reviewed    Pending Diagnostic Studies:     None         Medications:  Reconciled Home Medications:      Medication List      START taking these medications    sulfamethoxazole-trimethoprim 800-160mg 800-160 mg Tab  Commonly known as: BACTRIM DS  Take 1 tablet by mouth 2 (two) times daily.        CONTINUE taking these medications    amLODIPine 5 MG tablet  Commonly known as: NORVASC  Take 1 tablet (5 mg total) by mouth once daily.     docusate sodium 100 MG capsule  Commonly known as: COLACE  Take 100 mg by mouth 2 (two) times daily.     finasteride 5 mg tablet  Commonly known as: PROSCAR  Take 5 mg by mouth once daily.     multivitamin with minerals tablet  Take 1 tablet by mouth once daily.     pantoprazole 40 MG tablet  Commonly known as: PROTONIX  Take 1 tablet (40 mg total) by mouth 2 (two) times daily.        STOP taking these medications    acetaminophen 500 MG tablet  Commonly known as: TYLENOL     melatonin 3 mg tablet  Commonly known as: MELATIN            Indwelling Lines/Drains at time of discharge:   Lines/Drains/Airways     Drain  Duration                Gastrostomy/Enterostomy 07/21/23 1200 Percutaneous  endoscopic gastrostomy (PEG) LUQ feeding 61 days                Time spent on the discharge of patient: 60 minutes         Jeanna Chan MD  Department of Hospital Medicine  [facility] - Medical Surgical Unit

## 2023-09-21 NOTE — ASSESSMENT & PLAN NOTE
Rocephin 1 gm IVPB daily for 5 days  Monitor response  07/21/2023   PT HAS UTI DUE TO E COLI SENSITIVE TO ROCEPHIN  CONTINUE ROCEPHIN  07/24/2023  BACTRIM ON DC

## 2023-09-21 NOTE — ASSESSMENT & PLAN NOTE
PT/OT eval and treat  Weekly lab monitoring  Tx of UTI  07/21/2023  PT IS NOT A CANDIDATE FOR REHABILITATION PER PT/OT RECOMMENDATION  PT HAS ECOLI UTI AND WILL CONTINUE TREATMENT WITH ROCEPHIN  07/24/2023  DC TO NH

## 2023-09-21 NOTE — ASSESSMENT & PLAN NOTE
Referral to GI for PEG placement per family wishes  07/21/2023  PT HAD PEG PLACEMENT TODAY PER DR ABERNATHY AND IS STABLE POSTOPERATIVELY AND TFF WILL BE STARTED WITH RD AS CONSULTANT.  07/24/2023 CONTINUE TFF AT NH  RD HAS EVALUATED PT AND MADE RECOMMENDATIONS

## 2023-09-21 NOTE — ASSESSMENT & PLAN NOTE
PT HAS HX HYPERTENSION  PT HAD HYPOTENSION DUE TO HYPOVOLEMIA AND UTI IN ED  PT WAS TREATED WITH IVF  /69  CONTINUE AMLODIPINE  07/24/2023 CONTINUE POC ON DC /63

## 2023-09-21 NOTE — ASSESSMENT & PLAN NOTE
Monitor patient status  Fall risk precautions  07/21/2023  PT HAS SEVERE DEMENTIA  PEG PLACED  07/24/2023  DC TO NH CONTINUE MEDICAL MANAGEMENT AT NH

## 2023-09-21 NOTE — ASSESSMENT & PLAN NOTE
PT IS AT RISK FOR VTE  VTE PPX  LOVENOX, TEDS  PT IS NONAMBULATORY  07/24/2023  DC TO NH; VTE PPX PER PROVIDER AT NH

## 2024-01-01 ENCOUNTER — LAB REQUISITION (OUTPATIENT)
Dept: LAB | Facility: HOSPITAL | Age: 89
End: 2024-01-01
Attending: INTERNAL MEDICINE
Payer: MEDICARE

## 2024-01-01 ENCOUNTER — HOSPITAL ENCOUNTER (EMERGENCY)
Facility: HOSPITAL | Age: 89
End: 2024-03-30
Payer: MEDICARE

## 2024-01-01 DIAGNOSIS — I10 ESSENTIAL (PRIMARY) HYPERTENSION: ICD-10-CM

## 2024-01-01 DIAGNOSIS — I46.9 CARDIAC ARREST: Primary | ICD-10-CM

## 2024-01-01 DIAGNOSIS — E78.5 HYPERLIPIDEMIA, UNSPECIFIED: ICD-10-CM

## 2024-01-01 DIAGNOSIS — I46.9 DEATH DUE TO CARDIAC ARREST: ICD-10-CM

## 2024-01-01 LAB
ALBUMIN SERPL BCP-MCNC: 3.5 G/DL (ref 3.5–5)
ALBUMIN/GLOB SERPL: 0.8 {RATIO}
ALP SERPL-CCNC: 92 U/L (ref 45–115)
ALT SERPL W P-5'-P-CCNC: 37 U/L (ref 16–61)
ANION GAP SERPL CALCULATED.3IONS-SCNC: 13 MMOL/L (ref 7–16)
AST SERPL W P-5'-P-CCNC: 24 U/L (ref 15–37)
BASOPHILS # BLD AUTO: 0.01 K/UL (ref 0–0.2)
BASOPHILS NFR BLD AUTO: 0.2 % (ref 0–1)
BILIRUB SERPL-MCNC: 0.2 MG/DL (ref ?–1.2)
BUN SERPL-MCNC: 23 MG/DL (ref 7–18)
BUN/CREAT SERPL: 23 (ref 6–20)
CALCIUM SERPL-MCNC: 8.7 MG/DL (ref 8.5–10.1)
CHLORIDE SERPL-SCNC: 102 MMOL/L (ref 98–107)
CHOLEST SERPL-MCNC: 131 MG/DL (ref 0–200)
CHOLEST/HDLC SERPL: 2.4 {RATIO}
CO2 SERPL-SCNC: 28 MMOL/L (ref 21–32)
CREAT SERPL-MCNC: 1.01 MG/DL (ref 0.7–1.3)
DIFFERENTIAL METHOD BLD: ABNORMAL
EGFR (NO RACE VARIABLE) (RUSH/TITUS): 71 ML/MIN/1.73M2
EOSINOPHIL # BLD AUTO: 0.28 K/UL (ref 0–0.5)
EOSINOPHIL NFR BLD AUTO: 6.1 % (ref 1–4)
ERYTHROCYTE [DISTWIDTH] IN BLOOD BY AUTOMATED COUNT: 15 % (ref 11.5–14.5)
GLOBULIN SER-MCNC: 4.3 G/DL (ref 2–4)
GLUCOSE SERPL-MCNC: 118 MG/DL (ref 74–106)
HCT VFR BLD AUTO: 28.9 % (ref 40–54)
HCT VFR BLD AUTO: 30.4 % (ref 40–54)
HDLC SERPL-MCNC: 55 MG/DL (ref 40–60)
HGB BLD-MCNC: 9.2 G/DL (ref 13.5–18)
HGB BLD-MCNC: 9.8 G/DL (ref 13.5–18)
LDLC SERPL CALC-MCNC: 58 MG/DL
LDLC/HDLC SERPL: 1.1 {RATIO}
LYMPHOCYTES # BLD AUTO: 1.18 K/UL (ref 1–4.8)
LYMPHOCYTES NFR BLD AUTO: 25.5 % (ref 27–41)
MCH RBC QN AUTO: 31.3 PG (ref 27–31)
MCHC RBC AUTO-ENTMCNC: 32.2 G/DL (ref 32–36)
MCV RBC AUTO: 97.1 FL (ref 80–96)
MONOCYTES # BLD AUTO: 0.5 K/UL (ref 0–0.8)
MONOCYTES NFR BLD AUTO: 10.8 % (ref 2–6)
MPC BLD CALC-MCNC: 9.5 FL (ref 9.4–12.4)
NEUTROPHILS # BLD AUTO: 2.65 K/UL (ref 1.8–7.7)
NEUTROPHILS NFR BLD AUTO: 57.4 % (ref 53–65)
NONHDLC SERPL-MCNC: 76 MG/DL
PLATELET # BLD AUTO: 296 K/UL (ref 150–400)
POTASSIUM SERPL-SCNC: 4 MMOL/L (ref 3.5–5.1)
PROT SERPL-MCNC: 7.8 G/DL (ref 6.4–8.2)
RBC # BLD AUTO: 3.13 M/UL (ref 4.6–6.2)
SODIUM SERPL-SCNC: 139 MMOL/L (ref 136–145)
TRIGL SERPL-MCNC: 90 MG/DL (ref 35–150)
VLDLC SERPL-MCNC: 18 MG/DL
WBC # BLD AUTO: 4.62 K/UL (ref 4.5–11)

## 2024-01-01 PROCEDURE — 80053 COMPREHEN METABOLIC PANEL: CPT | Performed by: INTERNAL MEDICINE

## 2024-01-01 PROCEDURE — 99284 EMERGENCY DEPT VISIT MOD MDM: CPT | Mod: GF | Performed by: PHYSICIAN ASSISTANT

## 2024-01-01 PROCEDURE — 99283 EMERGENCY DEPT VISIT LOW MDM: CPT

## 2024-01-01 PROCEDURE — 85025 COMPLETE CBC W/AUTO DIFF WBC: CPT | Performed by: INTERNAL MEDICINE

## 2024-01-01 PROCEDURE — 80061 LIPID PANEL: CPT | Performed by: INTERNAL MEDICINE

## 2024-01-01 PROCEDURE — 85018 HEMOGLOBIN: CPT | Performed by: INTERNAL MEDICINE

## 2024-03-30 NOTE — ED NOTES
[facility]&P [facility]. [facility]# [id number]. Patient arrived in trauma bay at 0305 with full cardiac arrest and CPR in progress, patient in asystole on arrival, unknown down time from nursing home but patient found by staff around 0200.  Was on Rudy and supraglotic airway in upon arrival.  Patient had 2 rounds of epi prior to arrival.  [facility] on phone for consult.  Recommend cessation of code due to unknown down time. Patient with no vital signs upon assessment.  Time of death was called at 0308 by Dr. Vargas at [facility] and agreed upon by WARREN Eckert.

## 2024-03-30 NOTE — ED PROVIDER NOTES
Encounter Date: 3/30/2024       History     Chief Complaint   Patient presents with    Cardiac Arrest     Patient is a 89-year-old male brought in by EMS in full cardiac arrest with CPR in process.    Patient was found at 2:00 a.m. unresponsive, and they began CPR.    EMS arrived at about 2:30 a.m. placed a Rudy, and supraglottic airway.    He attempted interosseous line, bilaterally but patient had bilateral total knee arthroplasties.    Patient received 2 doses of epinephrine down the tube.    EMS stated patient never achieved a rhythm, and came in and asystole.  Patient's past medical history is positive for hypertension, Alzheimer's disease, arthritis  There is no surgical history documented, but you can see bilateral total knee arthroplasty scars, and patient has a PEG tube        Review of patient's allergies indicates:  No Known Allergies  Past Medical History:   Diagnosis Date    Alzheimer's disease, unspecified (CODE)     Arthritis     Hypertension      History reviewed. No pertinent surgical history.  History reviewed. No pertinent family history.  Social History     Tobacco Use    Smoking status: Unknown   Substance Use Topics    Drug use: Not Currently     Review of Systems   Constitutional:         Cardiac arrest   All other systems reviewed and are negative.      Physical Exam     Initial Vitals   BP Pulse Resp Temp SpO2   -- -- -- -- --      MAP       --         Physical Exam    Constitutional:   CPR is in progress, good bilateral breath sounds, rhythm check revealed asystole.    Patient's eyes were fixed and dilated, and he was cold to the touch and gray to appearance           Medical Screening Exam   See Full Note    ED Course   Procedures  Labs Reviewed - No data to display       Imaging Results    None          Medications - No data to display  Medical Decision Making  Patient is a 89-year-old male brought in by EMS in full cardiac arrest with CPR in process.    Patient was found at 2:00 a.m.  unresponsive, and they began CPR.    EMS arrived at about 2:30 a.m. placed a Rudy, and supraglottic airway.    He attempted interosseous line, bilaterally but patient had bilateral total knee arthroplasties.    Patient received 2 doses of epinephrine down the tube.    EMS stated patient never achieved a rhythm, and came in and asystole.  Patient's past medical history is positive for hypertension, Alzheimer's disease, arthritis  There is no surgical history documented, but you can see bilateral total knee arthroplasty scars, and patient has a PEG tube    Dr. Vargas as the [facility] in [place] was consulted via telemedicine and was present on the monitor in the [facility] [place] when patient arrived.    CPR was continued, patient was gray in appearance, cold to touch, and eyes were fixed and dilated.    Second rhythm check revealed asystole, and Dr. Schirmer instructed to cease succession of effort.    Time of death was 3:08 a.m.                                      Clinical Impression:   Final diagnoses:  [I46.9] Cardiac arrest (Primary)  [I46.9] Death due to cardiac arrest        ED Disposition Condition                     Gio Ibarra PA  [phone]

## 2024-03-30 NOTE — ED NOTES
Vilma at [facility]  Home notified of patient's death.   here and body released.

## 2024-03-30 NOTE — ED NOTES
Called Samra Moreno [facility] to advise of death. She advised she will come to ED.

## 2024-03-30 NOTE — ED NOTES
Called [facility] at [phone] and spoke with Nano Sepulveda RN to advise of death. Case number is [id number]. She advises that due to pts age pt will not be a candidate for donation.
